# Patient Record
Sex: MALE | Race: OTHER | HISPANIC OR LATINO | URBAN - METROPOLITAN AREA
[De-identification: names, ages, dates, MRNs, and addresses within clinical notes are randomized per-mention and may not be internally consistent; named-entity substitution may affect disease eponyms.]

---

## 2017-02-13 ENCOUNTER — INPATIENT (INPATIENT)
Facility: HOSPITAL | Age: 57
LOS: 1 days | Discharge: HOME | DRG: 374 | End: 2017-02-15
Attending: FAMILY MEDICINE | Admitting: INTERNAL MEDICINE
Payer: MEDICAID

## 2017-02-13 VITALS
DIASTOLIC BLOOD PRESSURE: 79 MMHG | TEMPERATURE: 98 F | OXYGEN SATURATION: 100 % | RESPIRATION RATE: 18 BRPM | WEIGHT: 175.05 LBS | HEART RATE: 78 BPM | SYSTOLIC BLOOD PRESSURE: 124 MMHG

## 2017-02-13 DIAGNOSIS — R17 UNSPECIFIED JAUNDICE: ICD-10-CM

## 2017-02-13 DIAGNOSIS — E11.9 TYPE 2 DIABETES MELLITUS WITHOUT COMPLICATIONS: ICD-10-CM

## 2017-02-13 DIAGNOSIS — I10 ESSENTIAL (PRIMARY) HYPERTENSION: ICD-10-CM

## 2017-02-13 DIAGNOSIS — K86.9 DISEASE OF PANCREAS, UNSPECIFIED: ICD-10-CM

## 2017-02-13 DIAGNOSIS — E78.5 HYPERLIPIDEMIA, UNSPECIFIED: ICD-10-CM

## 2017-02-13 LAB
ALBUMIN SERPL ELPH-MCNC: 3.8 G/DL — SIGNIFICANT CHANGE UP (ref 3.3–5.2)
ALP SERPL-CCNC: 215 U/L — HIGH (ref 40–120)
ALT FLD-CCNC: 106 U/L — HIGH
AMYLASE P1 CFR SERPL: 41 U/L — SIGNIFICANT CHANGE UP (ref 36–128)
ANION GAP SERPL CALC-SCNC: 9 MMOL/L — SIGNIFICANT CHANGE UP (ref 5–17)
APPEARANCE UR: ABNORMAL
APTT BLD: 29.7 SEC — SIGNIFICANT CHANGE UP (ref 27.5–37.4)
AST SERPL-CCNC: 65 U/L — HIGH
BACTERIA # UR AUTO: ABNORMAL
BASOPHILS # BLD AUTO: 0 K/UL — SIGNIFICANT CHANGE UP (ref 0–0.2)
BASOPHILS NFR BLD AUTO: 0.2 % — SIGNIFICANT CHANGE UP (ref 0–2)
BILIRUB SERPL-MCNC: 14.3 MG/DL — HIGH (ref 0.4–2)
BILIRUB UR-MCNC: ABNORMAL
BUN SERPL-MCNC: 13 MG/DL — SIGNIFICANT CHANGE UP (ref 8–20)
CALCIUM SERPL-MCNC: 9.8 MG/DL — SIGNIFICANT CHANGE UP (ref 8.6–10.2)
CHLORIDE SERPL-SCNC: 96 MMOL/L — LOW (ref 98–107)
CO2 SERPL-SCNC: 29 MMOL/L — SIGNIFICANT CHANGE UP (ref 22–29)
COLOR SPEC: ABNORMAL
CREAT SERPL-MCNC: 0.39 MG/DL — LOW (ref 0.5–1.3)
DIFF PNL FLD: ABNORMAL
EOSINOPHIL # BLD AUTO: 0 K/UL — SIGNIFICANT CHANGE UP (ref 0–0.5)
EOSINOPHIL NFR BLD AUTO: 0.9 % — SIGNIFICANT CHANGE UP (ref 0–5)
EPI CELLS # UR: SIGNIFICANT CHANGE UP
GLUCOSE SERPL-MCNC: 269 MG/DL — HIGH (ref 70–115)
GLUCOSE UR QL: 250 MG/DL
HCT VFR BLD CALC: 35.2 % — LOW (ref 42–52)
HGB BLD-MCNC: 12.3 G/DL — LOW (ref 14–18)
INR BLD: 1.43 RATIO — HIGH (ref 0.88–1.16)
KETONES UR-MCNC: ABNORMAL
LEUKOCYTE ESTERASE UR-ACNC: ABNORMAL
LIDOCAIN IGE QN: 44 U/L — SIGNIFICANT CHANGE UP (ref 22–51)
LYMPHOCYTES # BLD AUTO: 1.3 K/UL — SIGNIFICANT CHANGE UP (ref 1–4.8)
LYMPHOCYTES # BLD AUTO: 24.2 % — SIGNIFICANT CHANGE UP (ref 20–55)
MCHC RBC-ENTMCNC: 30 PG — SIGNIFICANT CHANGE UP (ref 27–31)
MCHC RBC-ENTMCNC: 34.9 G/DL — SIGNIFICANT CHANGE UP (ref 32–36)
MCV RBC AUTO: 85.9 FL — SIGNIFICANT CHANGE UP (ref 80–94)
MONOCYTES # BLD AUTO: 0.6 K/UL — SIGNIFICANT CHANGE UP (ref 0–0.8)
MONOCYTES NFR BLD AUTO: 10.4 % — HIGH (ref 3–10)
NEUTROPHILS # BLD AUTO: 3.4 K/UL — SIGNIFICANT CHANGE UP (ref 1.8–8)
NEUTROPHILS NFR BLD AUTO: 63.7 % — SIGNIFICANT CHANGE UP (ref 37–73)
NITRITE UR-MCNC: POSITIVE
PH UR: 5 — SIGNIFICANT CHANGE UP (ref 4.8–8)
PLATELET # BLD AUTO: 264 K/UL — SIGNIFICANT CHANGE UP (ref 150–400)
POTASSIUM SERPL-MCNC: 4.4 MMOL/L — SIGNIFICANT CHANGE UP (ref 3.5–5.3)
POTASSIUM SERPL-SCNC: 4.4 MMOL/L — SIGNIFICANT CHANGE UP (ref 3.5–5.3)
PROT SERPL-MCNC: 7.1 G/DL — SIGNIFICANT CHANGE UP (ref 6.6–8.7)
PROT UR-MCNC: 30 MG/DL
PROTHROM AB SERPL-ACNC: 15.8 SEC — HIGH (ref 10–13.1)
RBC # BLD: 4.1 M/UL — LOW (ref 4.6–6.2)
RBC # FLD: 14 % — SIGNIFICANT CHANGE UP (ref 11–15.6)
RBC CASTS # UR COMP ASSIST: ABNORMAL /HPF (ref 0–4)
SODIUM SERPL-SCNC: 134 MMOL/L — LOW (ref 135–145)
SP GR SPEC: 1.02 — SIGNIFICANT CHANGE UP (ref 1.01–1.02)
UROBILINOGEN FLD QL: 4 MG/DL
WBC # BLD: 5.29 K/UL — SIGNIFICANT CHANGE UP (ref 4.8–10.8)
WBC # FLD AUTO: 5.29 K/UL — SIGNIFICANT CHANGE UP (ref 4.8–10.8)
WBC UR QL: SIGNIFICANT CHANGE UP

## 2017-02-13 PROCEDURE — 99285 EMERGENCY DEPT VISIT HI MDM: CPT

## 2017-02-13 PROCEDURE — 74177 CT ABD & PELVIS W/CONTRAST: CPT | Mod: 26

## 2017-02-13 PROCEDURE — 99223 1ST HOSP IP/OBS HIGH 75: CPT

## 2017-02-13 RX ORDER — DEXTROSE 50 % IN WATER 50 %
25 SYRINGE (ML) INTRAVENOUS ONCE
Qty: 0 | Refills: 0 | Status: DISCONTINUED | OUTPATIENT
Start: 2017-02-13 | End: 2017-02-15

## 2017-02-13 RX ORDER — AMLODIPINE BESYLATE 2.5 MG/1
5 TABLET ORAL DAILY
Qty: 0 | Refills: 0 | Status: DISCONTINUED | OUTPATIENT
Start: 2017-02-13 | End: 2017-02-15

## 2017-02-13 RX ORDER — DEXTROSE 50 % IN WATER 50 %
12.5 SYRINGE (ML) INTRAVENOUS ONCE
Qty: 0 | Refills: 0 | Status: DISCONTINUED | OUTPATIENT
Start: 2017-02-13 | End: 2017-02-15

## 2017-02-13 RX ORDER — INSULIN LISPRO 100/ML
VIAL (ML) SUBCUTANEOUS
Qty: 0 | Refills: 0 | Status: DISCONTINUED | OUTPATIENT
Start: 2017-02-13 | End: 2017-02-15

## 2017-02-13 RX ORDER — DEXTROSE 50 % IN WATER 50 %
1 SYRINGE (ML) INTRAVENOUS ONCE
Qty: 0 | Refills: 0 | Status: DISCONTINUED | OUTPATIENT
Start: 2017-02-13 | End: 2017-02-15

## 2017-02-13 RX ORDER — INSULIN GLARGINE 100 [IU]/ML
30 INJECTION, SOLUTION SUBCUTANEOUS AT BEDTIME
Qty: 0 | Refills: 0 | Status: DISCONTINUED | OUTPATIENT
Start: 2017-02-13 | End: 2017-02-15

## 2017-02-13 RX ORDER — DIPHENHYDRAMINE HCL 50 MG
50 CAPSULE ORAL ONCE
Qty: 0 | Refills: 0 | Status: COMPLETED | OUTPATIENT
Start: 2017-02-13 | End: 2017-02-13

## 2017-02-13 RX ORDER — SODIUM CHLORIDE 9 MG/ML
1000 INJECTION, SOLUTION INTRAVENOUS
Qty: 0 | Refills: 0 | Status: DISCONTINUED | OUTPATIENT
Start: 2017-02-13 | End: 2017-02-15

## 2017-02-13 RX ORDER — GLUCAGON INJECTION, SOLUTION 0.5 MG/.1ML
1 INJECTION, SOLUTION SUBCUTANEOUS ONCE
Qty: 0 | Refills: 0 | Status: DISCONTINUED | OUTPATIENT
Start: 2017-02-13 | End: 2017-02-15

## 2017-02-13 RX ORDER — SODIUM CHLORIDE 9 MG/ML
3 INJECTION INTRAMUSCULAR; INTRAVENOUS; SUBCUTANEOUS EVERY 8 HOURS
Qty: 0 | Refills: 0 | Status: DISCONTINUED | OUTPATIENT
Start: 2017-02-13 | End: 2017-02-15

## 2017-02-13 RX ADMIN — SODIUM CHLORIDE 3 MILLILITER(S): 9 INJECTION INTRAMUSCULAR; INTRAVENOUS; SUBCUTANEOUS at 21:03

## 2017-02-13 RX ADMIN — SODIUM CHLORIDE 3 MILLILITER(S): 9 INJECTION INTRAMUSCULAR; INTRAVENOUS; SUBCUTANEOUS at 13:40

## 2017-02-13 RX ADMIN — Medication: at 22:59

## 2017-02-13 RX ADMIN — INSULIN GLARGINE 30 UNIT(S): 100 INJECTION, SOLUTION SUBCUTANEOUS at 22:59

## 2017-02-13 RX ADMIN — Medication 50 MILLIGRAM(S): at 12:46

## 2017-02-13 NOTE — ED STATDOCS - PROGRESS NOTE DETAILS
55 y/o male with h/o DM. HTN, and hepatitis (undetermined etiology, Dx at [facility] via CT and ERCP on 1/27/17) presents to ED c/o diffuse pruritis. Denies abd distension. Pt to be moved to main ED for complete evaluation by another provider. Pt is scheduled to see GI Dr. Jose Wise (GI) tomorrow, please contact him. Please try to obtain copies of ERCP and CT scan results from [facility] and obtain GI consult (Billie, if available).

## 2017-02-13 NOTE — SBIRT NOTE. - NSSBIRTSERVICES_GEN_A_ED_FT
Provided SBIRT services: Full screen Negative. Positive reinforcement provided given patient currently within healthy guidelines. Education materials reviewed and given to patient.  Audit Score: 7  DAST Score: 0  Duration = 3 Minutes

## 2017-02-13 NOTE — ED ADULT NURSE NOTE - OBJECTIVE STATEMENT
Patient arrived to ED today with c/o body aches, body itch, and swelling to his abdomen.  Patient has hepatitis.  pt is A/Ox3.

## 2017-02-13 NOTE — ED PROVIDER NOTE - MEDICAL DECISION MAKING DETAILS
Patient w/ pancreatic mass pushing on duodenum and obstructing the common bile duct.  Will admit patient for further eval and therapy

## 2017-02-13 NOTE — H&P ADULT. - ASSESSMENT
56 years old male with PMH of HTN, DM and Dyslipidemia , recently diagnosed with pancreatic mass admitted with pruritis and yellow discoloration of the skin.

## 2017-02-13 NOTE — ED ADULT TRIAGE NOTE - CHIEF COMPLAINT QUOTE
Patient arrived to ED today with c/o body aches, body itch, and swelling to his abdomen.  Patient has hepatitis.

## 2017-02-13 NOTE — ED PROVIDER NOTE - OBJECTIVE STATEMENT
57yo male recently released from [facility] for painless jaundice with itching, was told he had a "mass" in liver and needed it "drained"(biopsied?) and was told to come to [facility] because [facility] does not do this procedure.    CC: pruritis and jaundice  Presenting symptoms: jaundice and itching which is being treated by benadryl  Pertinent Negatives: abdominal pain  Timin weeks ago noticed the jaundice and pruritis  Quality: itching  Severity: moderate  Aggravating Factors: none  Relieving Factors: benadryl

## 2017-02-13 NOTE — H&P ADULT. - FAMILY HISTORY
Aunt  Still living? Yes, Estimated age: Age Unknown  Family history of cancer, Age at diagnosis: Age Unknown

## 2017-02-13 NOTE — ED PROVIDER NOTE - CONSTITUTIONAL, MLM
normal... well nourished, awake, alert, oriented to person, place, time/situation and in no apparent distress.

## 2017-02-13 NOTE — ED PROVIDER NOTE - CARE PLAN
Principal Discharge DX:	Jaundice with stoppage of bile flow  Secondary Diagnosis:	Pancreatic mass  Secondary Diagnosis:	Hyperbilirubinemia

## 2017-02-13 NOTE — H&P ADULT. - PROBLEM SELECTOR PLAN 1
s/p biopsy at [facility] 12 days ago, results pending.  obtain medical record from [facility].  amylase/lipase level  GI consult.

## 2017-02-13 NOTE — H&P ADULT. - HISTORY OF PRESENT ILLNESS
56 years old male with PMH of HTN, DM and Dyslipidemia presented to the ER with 2 weeks history of pruritis and yellow discoloration of the skin. He was recently admitted at [facility] for the same, found to have pancreatic mass which was biopsied. Patient was discharged home t follow up with GI. Patient denies any fever, chills, nausea, vomiting, abdominal pain, diarrhea , constipation or urinary complaints.

## 2017-02-14 DIAGNOSIS — E80.6 OTHER DISORDERS OF BILIRUBIN METABOLISM: ICD-10-CM

## 2017-02-14 DIAGNOSIS — R17 UNSPECIFIED JAUNDICE: ICD-10-CM

## 2017-02-14 LAB
CULTURE RESULTS: NO GROWTH — SIGNIFICANT CHANGE UP
HAV IGM SER-ACNC: SIGNIFICANT CHANGE UP
HBV CORE IGM SER-ACNC: SIGNIFICANT CHANGE UP
HBV SURFACE AG SER-ACNC: SIGNIFICANT CHANGE UP
HCV AB S/CO SERPL IA: 0.25 S/CO — SIGNIFICANT CHANGE UP
HCV AB SERPL-IMP: SIGNIFICANT CHANGE UP
SPECIMEN SOURCE: SIGNIFICANT CHANGE UP

## 2017-02-14 PROCEDURE — 99233 SBSQ HOSP IP/OBS HIGH 50: CPT

## 2017-02-14 RX ORDER — INFLUENZA VIRUS VACCINE 15; 15; 15; 15 UG/.5ML; UG/.5ML; UG/.5ML; UG/.5ML
0.5 SUSPENSION INTRAMUSCULAR ONCE
Qty: 0 | Refills: 0 | Status: DISCONTINUED | OUTPATIENT
Start: 2017-02-14 | End: 2017-02-15

## 2017-02-14 RX ORDER — LISINOPRIL 2.5 MG/1
10 TABLET ORAL DAILY
Qty: 0 | Refills: 0 | Status: DISCONTINUED | OUTPATIENT
Start: 2017-02-14 | End: 2017-02-15

## 2017-02-14 RX ORDER — INSULIN LISPRO 100/ML
8 VIAL (ML) SUBCUTANEOUS ONCE
Qty: 0 | Refills: 0 | Status: COMPLETED | OUTPATIENT
Start: 2017-02-14 | End: 2017-02-15

## 2017-02-14 RX ORDER — DIPHENHYDRAMINE HCL 50 MG
25 CAPSULE ORAL ONCE
Qty: 0 | Refills: 0 | Status: COMPLETED | OUTPATIENT
Start: 2017-02-14 | End: 2017-02-14

## 2017-02-14 RX ADMIN — LISINOPRIL 10 MILLIGRAM(S): 2.5 TABLET ORAL at 22:19

## 2017-02-14 RX ADMIN — SODIUM CHLORIDE 3 MILLILITER(S): 9 INJECTION INTRAMUSCULAR; INTRAVENOUS; SUBCUTANEOUS at 14:46

## 2017-02-14 RX ADMIN — SODIUM CHLORIDE 3 MILLILITER(S): 9 INJECTION INTRAMUSCULAR; INTRAVENOUS; SUBCUTANEOUS at 05:37

## 2017-02-14 RX ADMIN — Medication 25 MILLIGRAM(S): at 06:19

## 2017-02-14 RX ADMIN — AMLODIPINE BESYLATE 5 MILLIGRAM(S): 2.5 TABLET ORAL at 05:37

## 2017-02-14 RX ADMIN — Medication: at 14:46

## 2017-02-14 RX ADMIN — Medication: at 17:24

## 2017-02-14 RX ADMIN — INSULIN GLARGINE 30 UNIT(S): 100 INJECTION, SOLUTION SUBCUTANEOUS at 22:17

## 2017-02-14 RX ADMIN — Medication: at 09:55

## 2017-02-14 RX ADMIN — SODIUM CHLORIDE 3 MILLILITER(S): 9 INJECTION INTRAMUSCULAR; INTRAVENOUS; SUBCUTANEOUS at 22:20
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
## 2017-02-14 NOTE — ED ADULT NURSE REASSESSMENT NOTE - NS ED NURSE REASSESS COMMENT FT1
pt A&Ox3, resting comfortably easily arousable, resp even and unlabored no distress noted, pt denies any pain andhas  no complaints , will continue to monitor
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
## 2017-02-14 NOTE — ED ADULT NURSE REASSESSMENT NOTE - NS ED NURSE REASSESS COMMENT FT1
pt sleeping and easily arousable, resp even and unlabored no distress noted , pt denies any pain and has nocomplaints will continue to monitor

## 2017-02-14 NOTE — PROGRESS NOTE ADULT - PROBLEM SELECTOR PLAN 1
GI consult appreciated. Biopsy at [facility] showed poorly differentiated duodenal adenocarcinoma involving ampulla. ERCP was aborted at [facility]. No PTC done at [facility] due to lack of any biliary dilation. On CT here yesterday, there is no biliary dilation.   IR-guided percutaneous cholecystostomy tomorrow is scheduled for tomorrow. GI consult appreciated. Biopsy at [facility] showed poorly differentiated duodenal adenocarcinoma involving ampulla. ERCP was aborted at [facility]. No PTC done at [facility] due to lack of any biliary dilation. On CT here yesterday, there is  biliary dilation.   IR-guided percutaneous cholecystostomy tomorrow is scheduled for tomorrow. GI consult appreciated. Biopsy at [facility] showed poorly differentiated duodenal adenocarcinoma involving ampulla. ERCP was aborted at [facility]. No PTC done at [facility] due to lack of any biliary dilation. On CT here yesterday, there is no biliary dilation.   IR-guided percutaneous cholecystostomy tomorrow is scheduled for tomorrow.

## 2017-02-15 VITALS
SYSTOLIC BLOOD PRESSURE: 115 MMHG | HEART RATE: 85 BPM | TEMPERATURE: 98 F | DIASTOLIC BLOOD PRESSURE: 69 MMHG | RESPIRATION RATE: 17 BRPM | OXYGEN SATURATION: 100 %

## 2017-02-15 DIAGNOSIS — K31.89 OTHER DISEASES OF STOMACH AND DUODENUM: ICD-10-CM

## 2017-02-15 LAB
ALBUMIN SERPL ELPH-MCNC: 3.8 G/DL — SIGNIFICANT CHANGE UP (ref 3.3–5.2)
ALP SERPL-CCNC: 222 U/L — HIGH (ref 40–120)
ALT FLD-CCNC: 101 U/L — HIGH
ANION GAP SERPL CALC-SCNC: 13 MMOL/L — SIGNIFICANT CHANGE UP (ref 5–17)
APTT BLD: 30.2 SEC — SIGNIFICANT CHANGE UP (ref 27.5–37.4)
AST SERPL-CCNC: 66 U/L — HIGH
BILIRUB SERPL-MCNC: 14.7 MG/DL — HIGH (ref 0.4–2)
BLD GP AB SCN SERPL QL: SIGNIFICANT CHANGE UP
BUN SERPL-MCNC: 19 MG/DL — SIGNIFICANT CHANGE UP (ref 8–20)
CALCIUM SERPL-MCNC: 9.6 MG/DL — SIGNIFICANT CHANGE UP (ref 8.6–10.2)
CHLORIDE SERPL-SCNC: 98 MMOL/L — SIGNIFICANT CHANGE UP (ref 98–107)
CO2 SERPL-SCNC: 27 MMOL/L — SIGNIFICANT CHANGE UP (ref 22–29)
CREAT SERPL-MCNC: 0.56 MG/DL — SIGNIFICANT CHANGE UP (ref 0.5–1.3)
GLUCOSE SERPL-MCNC: 198 MG/DL — HIGH (ref 70–115)
GRAM STN FLD: SIGNIFICANT CHANGE UP
HCT VFR BLD CALC: 36.7 % — LOW (ref 42–52)
HGB BLD-MCNC: 12.8 G/DL — LOW (ref 14–18)
INR BLD: 1.52 RATIO — HIGH (ref 0.88–1.16)
MCHC RBC-ENTMCNC: 29.7 PG — SIGNIFICANT CHANGE UP (ref 27–31)
MCHC RBC-ENTMCNC: 34.9 G/DL — SIGNIFICANT CHANGE UP (ref 32–36)
MCV RBC AUTO: 85.2 FL — SIGNIFICANT CHANGE UP (ref 80–94)
PLATELET # BLD AUTO: 260 K/UL — SIGNIFICANT CHANGE UP (ref 150–400)
POTASSIUM SERPL-MCNC: 4.1 MMOL/L — SIGNIFICANT CHANGE UP (ref 3.5–5.3)
POTASSIUM SERPL-SCNC: 4.1 MMOL/L — SIGNIFICANT CHANGE UP (ref 3.5–5.3)
PROT SERPL-MCNC: 7 G/DL — SIGNIFICANT CHANGE UP (ref 6.6–8.7)
PROTHROM AB SERPL-ACNC: 16.8 SEC — HIGH (ref 10–13.1)
RBC # BLD: 4.31 M/UL — LOW (ref 4.6–6.2)
RBC # FLD: 14.2 % — SIGNIFICANT CHANGE UP (ref 11–15.6)
SODIUM SERPL-SCNC: 138 MMOL/L — SIGNIFICANT CHANGE UP (ref 135–145)
SPECIMEN SOURCE: SIGNIFICANT CHANGE UP
TYPE + AB SCN PNL BLD: SIGNIFICANT CHANGE UP
WBC # BLD: 4.9 K/UL — SIGNIFICANT CHANGE UP (ref 4.8–10.8)
WBC # FLD AUTO: 4.9 K/UL — SIGNIFICANT CHANGE UP (ref 4.8–10.8)

## 2017-02-15 PROCEDURE — 75989 ABSCESS DRAINAGE UNDER X-RAY: CPT

## 2017-02-15 PROCEDURE — T1013: CPT

## 2017-02-15 PROCEDURE — 87075 CULTR BACTERIA EXCEPT BLOOD: CPT

## 2017-02-15 PROCEDURE — 36415 COLL VENOUS BLD VENIPUNCTURE: CPT

## 2017-02-15 PROCEDURE — 87205 SMEAR GRAM STAIN: CPT

## 2017-02-15 PROCEDURE — 99223 1ST HOSP IP/OBS HIGH 75: CPT

## 2017-02-15 PROCEDURE — 74177 CT ABD & PELVIS W/CONTRAST: CPT

## 2017-02-15 PROCEDURE — 82248 BILIRUBIN DIRECT: CPT

## 2017-02-15 PROCEDURE — 87086 URINE CULTURE/COLONY COUNT: CPT

## 2017-02-15 PROCEDURE — 99239 HOSP IP/OBS DSCHRG MGMT >30: CPT

## 2017-02-15 PROCEDURE — 99285 EMERGENCY DEPT VISIT HI MDM: CPT | Mod: 25

## 2017-02-15 PROCEDURE — 87070 CULTURE OTHR SPECIMN AEROBIC: CPT

## 2017-02-15 PROCEDURE — 81001 URINALYSIS AUTO W/SCOPE: CPT

## 2017-02-15 PROCEDURE — 83690 ASSAY OF LIPASE: CPT

## 2017-02-15 PROCEDURE — 86900 BLOOD TYPING SEROLOGIC ABO: CPT

## 2017-02-15 PROCEDURE — 96374 THER/PROPH/DIAG INJ IV PUSH: CPT | Mod: XU

## 2017-02-15 PROCEDURE — 71260 CT THORAX DX C+: CPT

## 2017-02-15 PROCEDURE — 85730 THROMBOPLASTIN TIME PARTIAL: CPT

## 2017-02-15 PROCEDURE — 71260 CT THORAX DX C+: CPT | Mod: 26

## 2017-02-15 PROCEDURE — 85610 PROTHROMBIN TIME: CPT

## 2017-02-15 PROCEDURE — 47490 INCISION OF GALLBLADDER: CPT

## 2017-02-15 PROCEDURE — 85027 COMPLETE CBC AUTOMATED: CPT

## 2017-02-15 PROCEDURE — 82150 ASSAY OF AMYLASE: CPT

## 2017-02-15 PROCEDURE — 80074 ACUTE HEPATITIS PANEL: CPT

## 2017-02-15 PROCEDURE — 86850 RBC ANTIBODY SCREEN: CPT

## 2017-02-15 PROCEDURE — 86901 BLOOD TYPING SEROLOGIC RH(D): CPT

## 2017-02-15 PROCEDURE — 80053 COMPREHEN METABOLIC PANEL: CPT

## 2017-02-15 PROCEDURE — 83735 ASSAY OF MAGNESIUM: CPT

## 2017-02-15 RX ORDER — TADALAFIL 10 MG/1
1 TABLET, FILM COATED ORAL
Qty: 0 | Refills: 0 | COMMUNITY

## 2017-02-15 RX ORDER — SIMVASTATIN 20 MG/1
1 TABLET, FILM COATED ORAL
Qty: 0 | Refills: 0 | COMMUNITY

## 2017-02-15 RX ORDER — INSULIN GLARGINE 100 [IU]/ML
40 INJECTION, SOLUTION SUBCUTANEOUS AT BEDTIME
Qty: 0 | Refills: 0 | Status: DISCONTINUED | OUTPATIENT
Start: 2017-02-15 | End: 2017-02-15

## 2017-02-15 RX ORDER — CETIRIZINE HYDROCHLORIDE 10 MG/1
1 TABLET ORAL
Qty: 0 | Refills: 0 | COMMUNITY

## 2017-02-15 RX ORDER — METFORMIN HYDROCHLORIDE 850 MG/1
1 TABLET ORAL
Qty: 0 | Refills: 0 | COMMUNITY

## 2017-02-15 RX ADMIN — AMLODIPINE BESYLATE 5 MILLIGRAM(S): 2.5 TABLET ORAL at 06:07

## 2017-02-15 RX ADMIN — Medication 6: at 12:32

## 2017-02-15 RX ADMIN — LISINOPRIL 10 MILLIGRAM(S): 2.5 TABLET ORAL at 06:09

## 2017-02-15 RX ADMIN — SODIUM CHLORIDE 3 MILLILITER(S): 9 INJECTION INTRAMUSCULAR; INTRAVENOUS; SUBCUTANEOUS at 06:10

## 2017-02-15 RX ADMIN — SODIUM CHLORIDE 3 MILLILITER(S): 9 INJECTION INTRAMUSCULAR; INTRAVENOUS; SUBCUTANEOUS at 14:18

## 2017-02-15 RX ADMIN — Medication 8 UNIT(S): at 00:15

## 2017-02-15 NOTE — DISCHARGE NOTE ADULT - SECONDARY DIAGNOSIS.
Jaundice with stoppage of bile flow Secondary hypertension Hyperlipidemia, unspecified hyperlipidemia type Type 2 diabetes mellitus with diabetic polyneuropathy, unspecified long term insulin use status

## 2017-02-15 NOTE — DISCHARGE NOTE ADULT - HOSPITAL COURSE
CC: Jaundice (15 Feb 2017 12:25)    HPI:56 years old male with PMH of HTN, DM and Dyslipidemia presented to the ER with 2 weeks history of pruritis and yellow discoloration of the skin. He was recently admitted at [facility] for the same, found to have pancreatic mass which was biopsied. Patient was discharged home t follow up with GI. Patient denies any fever, chills, nausea, vomiting, abdominal pain, diarrhea , constipation or urinary complaints. (13 Feb 2017 18:34)    INTERVAL HPI/OVERNIGHT EVENTS: + SOB/ wheezing, confusion    Vital Signs Last 24 Hrs  T(C): 36.6, Max: 37 (02-15 @ 00:30)  T(F): 97.9, Max: 98.6 (02-15 @ 00:30)  HR: 85 (80 - 85)  BP: 115/69 (115/69 - 138/81)  RR: 17 (17 - 18)  SpO2: 100% (98% - 100%)                        12.8   4.9   )-----------( 260      ( 15 Feb 2017 07:31 )             36.7     15 Feb 2017 07:31    138    |  98     |  19.0   ----------------------------<  198    4.1     |  27.0   |  0.56     Ca    9.6        15 Feb 2017 07:31    TPro  7.0    /  Alb  3.8    /  TBili  14.7   /  DBili  x      /  AST  66     /  ALT  101    /  AlkPhos  222    15 Feb 2017 07:31    PT/INR - ( 15 Feb 2017 07:31 )   PT: 16.8 sec;   INR: 1.52 ratio      PTT - ( 15 Feb 2017 07:31 )  PTT:30.2 sec  CAPILLARY BLOOD GLUCOSE  415 (15 Feb 2017 12:30)  171 (15 Feb 2017 08:00)  241 (14 Feb 2017 14:46)    LIVER FUNCTIONS - ( 15 Feb 2017 07:31 )  Alb: 3.8 g/dL / Pro: 7.0 g/dL / ALK PHOS: 222 U/L / ALT: 101 U/L / AST: 66 U/L / GGT: x           MEDICATIONS  (STANDING):  sodium chloride 0.9% lock flush 3milliLiter(s) IV Push every 8 hours  amLODIPine   Tablet 5milliGRAM(s) Oral daily  insulin lispro (HumaLOG) corrective regimen sliding scale  SubCutaneous three times a day before meals  dextrose 5%. 1000milliLiter(s) IV Continuous <Continuous>  dextrose 50% Injectable 12.5Gram(s) IV Push once  dextrose 50% Injectable 25Gram(s) IV Push once  dextrose 50% Injectable 25Gram(s) IV Push once  influenza   Vaccine 0.5milliLiter(s) IntraMuscular once  lisinopril 10milliGRAM(s) Oral daily  insulin glargine Injectable (LANTUS) 40Unit(s) SubCutaneous at bedtime    MEDICATIONS  (PRN):  dextrose Gel 1Dose(s) Oral once PRN Blood Glucose LESS THAN 70 milliGRAM(s)/deciliter  glucagon  Injectable 1milliGRAM(s) IntraMuscular once PRN Glucose LESS THAN 70 milligrams/deciliter    RADIOLOGY & ADDITIONAL TESTS: personally visualized    PHYSICAL EXAM:    General: Well developed; well nourished; in no acute distress  Eyes: PERRLA, EOMI; conjunctiva and sclera with jaundice  Head: Normocephalic; atraumatic  ENMT: No nasal discharge; airway clear  Neck: Supple; non tender; no masses  Respiratory: No wheezes, rales or rhonchi  Cardiovascular: Regular rate and rhythm. S1 and S2 Normal  Gastrointestinal: Soft non-tender non-distended; Normal bowel sounds, + cholecystostomy tube with bilous drainage  Genitourinary: No costovertebral angle tenderness  Extremities: Normal range of motion, No clubbing, cyanosis or edema  Vascular: Peripheral pulses palpable 2+ bilaterally  Neurological: Alert and oriented x4  Skin: Warm and dry. jaundiced  Musculoskeletal: Normal gait, tone, without deformities  Psychiatric: Cooperative and appropriate    A/P: 56 y.o. male with DMII, HTN, HLD with new pancreatic mass  1. Pancreatic mass - duodenal poorly differentiated adenocarcinoma - s/p percutaneous cholecystostomy due to biliary obstruction with jaundice and pruritus, f/u with Oncology and Surgical oncology, f/u CT chest for staging purposes  2. DMII - lantus + novolin  3. HLD - stopped statin due to weight loss  4. HTN - norvasc + lisinopril    Discussed with Dr. Barba and Ian  Time spent 65 min'  PMD notified

## 2017-02-15 NOTE — DISCHARGE NOTE ADULT - CARE PROVIDER_API CALL
Dr. Almita Garcia  Internal medicine  [address] [place] [address]  [phone]  Phone: (   )    -  Fax: (   )    -    Arthur Moreira), Hematology; Medical Oncology  [address] [place] [address]  Phone: [phone]  Fax: [phone] Dr. Almita Garcia  Internal medicine  [address] [facility], [place] [address]  [phone]  Phone: (   )    -  Fax: (   )    -    Ana María Barba (MD), [facility]; Internal Medicine  [address] [place] [address]  Phone: [phone]  Fax: [phone]    Avelino Sosa), ColonRectal Surgery; Surgery  [address] [place] [address]  Phone: [phone]  Fax: [phone]
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
## 2017-02-15 NOTE — CONSULT NOTE ADULT - ASSESSMENT
Mr. Doyle is a 56 year old gentleman with a recently presented with jaundice, pruritus and was found to have a duodenal mass which was biopsied at [facility].  There was no biliary dilatation hence no PTC was done.  Biopsy of the mass reportedly shows poorly differentiated duodenal adenocarcinoma.  He is now admitted at [facility] for worsening pruritus, jaundice and is status post percutaneous cholecystostomy tube by IR today.

## 2017-02-15 NOTE — DISCHARGE NOTE ADULT - PROVIDER TOKENS
FREE:[LAST:[Jose],PHONE:[(   )    -],FAX:[(   )    -],ADDRESS:[Dr. Almita Garcia  Internal medicine  [address] [place] [address]  [phone]]],TOKEN:'[email or web address]' FREE:[LAST:[Jose],PHONE:[(   )    -],FAX:[(   )    -],ADDRESS:[Dr. Almita Garcia  Internal medicine  [address] [place] [address]  [phone]]],TOKEN:'[id number]',TOKEN:'[id number]'

## 2017-02-15 NOTE — DISCHARGE NOTE ADULT - CARE PLAN
Principal Discharge DX:	Pancreatic mass  Goal:	treatment  Instructions for follow-up, activity and diet:	with biliary obstruction, jaundice and pruritus - s/p percutaneous cholecystostomy  Bx from [facility] as per Dr. Mann consistent with poorly differentiated duodenal adenocarcinoma   - plan for f/u with Jameson Collins for treatment plan  CT chest with IV contrast for staging purposes prior to DC  Secondary Diagnosis:	Jaundice with stoppage of bile flow  Instructions for follow-up, activity and diet:	see above  Secondary Diagnosis:	Secondary hypertension  Instructions for follow-up, activity and diet:	norvasc, lisinopril  Secondary Diagnosis:	Hyperlipidemia, unspecified hyperlipidemia type  Instructions for follow-up, activity and diet:	stop statin due to weight loss  Secondary Diagnosis:	Type 2 diabetes mellitus with diabetic polyneuropathy, unspecified long term insulin use status  Instructions for follow-up, activity and diet:	lantus, novolin, diet Principal Discharge DX:	Pancreatic mass  Goal:	treatment  Instructions for follow-up, activity and diet:	with biliary obstruction, jaundice and pruritus - s/p percutaneous cholecystostomy  Bx from [facility] as per Dr. Mann consistent with poorly differentiated duodenal adenocarcinoma   - plan for f/u with Jameson Collins for treatment plan  CT chest with IV contrast for staging purposes prior to DC  Secondary Diagnosis:	Jaundice with stoppage of bile flow  Instructions for follow-up, activity and diet:	see above  Secondary Diagnosis:	Secondary hypertension  Instructions for follow-up, activity and diet:	norvasc, lisinopril  Secondary Diagnosis:	Hyperlipidemia, unspecified hyperlipidemia type  Instructions for follow-up, activity and diet:	stop statin due to weight loss  Secondary Diagnosis:	Type 2 diabetes mellitus with diabetic polyneuropathy, unspecified long term insulin use status  Instructions for follow-up, activity and diet:	lantus, novolin, diet Principal Discharge DX:	Pancreatic mass  Goal:	treatment  Instructions for follow-up, activity and diet:	with biliary obstruction, jaundice and pruritus - s/p percutaneous cholecystostomy  Bx from [facility] as per Dr. Mann consistent with poorly differentiated duodenal adenocarcinoma   - plan for f/u with Jameson Collins for treatment plan  CT chest with IV contrast for staging purposes prior to DC  Secondary Diagnosis:	Jaundice with stoppage of bile flow  Instructions for follow-up, activity and diet:	see above  Secondary Diagnosis:	Secondary hypertension  Instructions for follow-up, activity and diet:	norvasc, lisinopril  Secondary Diagnosis:	Hyperlipidemia, unspecified hyperlipidemia type  Instructions for follow-up, activity and diet:	stop statin due to weight loss  Secondary Diagnosis:	Type 2 diabetes mellitus with diabetic polyneuropathy, unspecified long term insulin use status  Instructions for follow-up, activity and diet:	lantus, novolin, diet

## 2017-02-15 NOTE — DISCHARGE NOTE ADULT - PLAN OF CARE
treatment with biliary obstruction, jaundice and pruritus - s/p percutaneous cholecystostomy  Bx from [facility] as per Dr. Mann consistent with poorly differentiated duodenal adenocarcinoma   - plan for f/u with Jameson Collins for treatment plan  CT chest with IV contrast for staging purposes prior to DC see above norvasc, lisinopril stop statin due to weight loss sawyertus, novolin, diet

## 2017-02-15 NOTE — PROGRESS NOTE ADULT - SUBJECTIVE AND OBJECTIVE BOX
INTERVAL HPI/OVERNIGHT EVENTS:  Patient seen and examined    MEDICATIONS  (STANDING):  sodium chloride 0.9% lock flush 3milliLiter(s) IV Push every 8 hours  amLODIPine   Tablet 5milliGRAM(s) Oral daily  insulin glargine Injectable (LANTUS) 30Unit(s) SubCutaneous at bedtime  insulin lispro (HumaLOG) corrective regimen sliding scale  SubCutaneous three times a day before meals  dextrose 5%. 1000milliLiter(s) IV Continuous <Continuous>  dextrose 50% Injectable 12.5Gram(s) IV Push once  dextrose 50% Injectable 25Gram(s) IV Push once  dextrose 50% Injectable 25Gram(s) IV Push once  influenza   Vaccine 0.5milliLiter(s) IntraMuscular once  lisinopril 10milliGRAM(s) Oral daily    MEDICATIONS  (PRN):  dextrose Gel 1Dose(s) Oral once PRN Blood Glucose LESS THAN 70 milliGRAM(s)/deciliter  glucagon  Injectable 1milliGRAM(s) IntraMuscular once PRN Glucose LESS THAN 70 milligrams/deciliter      Allergies    No Known Allergies    Intolerances        Vital Signs Last 24 Hrs  T(C): 36.6, Max: 37 (02-15 @ 00:30)  T(F): 97.9, Max: 98.6 (02-15 @ 00:30)  HR: 85 (80 - 85)  BP: 115/69 (115/69 - 138/81)  BP(mean): --  RR: 17 (17 - 18)  SpO2: 100% (98% - 100%)    PHYSICAL EXAM:  General: NAD.  CVS: S1, S2  Chest: air entry bilaterally present  Abd: BS present, soft, non-tender      LABS:                        12.8   4.9   )-----------( 260      ( 15 Feb 2017 07:31 )             36.7     15 Feb 2017 07:31    138    |  98     |  19.0   ----------------------------<  198    4.1     |  27.0   |  0.56     Ca    9.6        15 Feb 2017 07:31  Mg     1.8       13 Feb 2017 13:24    TPro  7.0    /  Alb  3.8    /  TBili  14.7   /  DBili  x      /  AST  66     /  ALT  101    /  AlkPhos  222    15 Feb 2017 07:31    PT/INR - ( 15 Feb 2017 07:31 )   PT: 16.8 sec;   INR: 1.52 ratio         PTT - ( 15 Feb 2017 07:31 )  PTT:30.2 sec

## 2017-02-15 NOTE — DISCHARGE NOTE ADULT - MEDICATION SUMMARY - MEDICATIONS TO STOP TAKING
I will STOP taking the medications listed below when I get home from the hospital:    Lantus  -- 45 unit(s) subcutaneous once (at bedtime)    Novolin N  -- 25 unit(s) subcutaneous once a day  AM    simvastatin 20 mg oral tablet  -- 1 tab(s) by mouth once a day (at bedtime)    metformin 500 mg oral tablet  -- 1 tab(s) by mouth 2 times a day    Percocet 10/325 325 mg-10 mg oral tablet  -- 1 tab(s) by mouth every 6 hours, As Needed pain

## 2017-02-15 NOTE — PROGRESS NOTE ADULT - ASSESSMENT
IMPRESSION:  56 years old male with PMH of HTN, DM and Dyslipidemia presented to the ER with 2 weeks history of pruritis and yellow discoloration of the skin. He was recently admitted at [facility] and found to have poorly differentiated duodenal adenocarcinoma involving ampulla. ERCP aborted. No PTC done due to lack of any biliary dilation. He was now admitted here for worsening jaundice and pruritis. CT abdomen with contrast again didn't any biliary dilation.  - s/p percut cholecystostomy tube placement today.    PLAN:  - monitor LFTs and INR  - f/u hepatology panel.  - Oncology consult.  - No endoscopic intervention for now  - d/w medicine team.
63 y/o male w PMH HTN, HLD, DM, admitted to [facility] for further evaluation and treatment pancreatic mass, elevated LFTs, jaundice, pruritus.

## 2017-02-15 NOTE — DISCHARGE NOTE ADULT - CARE PROVIDERS DIRECT ADDRESSES
,DirectAddress_Unknown,[email or web address],DirectAddress_Unknown ,DirectAddress_Unknown,[email or web address],DirectAddress_Unknown

## 2017-02-15 NOTE — DISCHARGE NOTE ADULT - MEDICATION SUMMARY - MEDICATIONS TO TAKE
I will START or STAY ON the medications listed below when I get home from the hospital:    lisinopril 10 mg oral tablet  -- 1 tab(s) by mouth once a day  -- Indication: For HTN    gabapentin 600 mg oral tablet  -- 1 tab(s) by mouth 3 times a day  -- Indication: For Neuropathy    insulin glargine 100 units/mL subcutaneous solution  -- 40 unit(s) subcutaneous once a day (at bedtime)  -- Indication: For Diabetes    Novolin N  -- 25 unit(s) subcutaneous once a day  AM  -- Indication: For Diabetes    amlodipine 5 mg oral tablet  -- 1 tab(s) by mouth once a day  -- Indication: For HTN    fluticasone 50 mcg/inh nasal spray  -- 1 spray(s) into nose 2 times a day  -- Indication: For Postnasal drip

## 2017-02-15 NOTE — DISCHARGE NOTE ADULT - PATIENT PORTAL LINK FT
“You can access the [facility] Patient Portal, offered by [facility], by registering with the following website: [email or web address]”

## 2017-02-17 ENCOUNTER — OUTPATIENT (OUTPATIENT)
Dept: OUTPATIENT SERVICES | Facility: HOSPITAL | Age: 57
LOS: 1 days | Discharge: HOME | End: 2017-02-17

## 2017-02-17 DIAGNOSIS — C17.0 MALIGNANT NEOPLASM OF DUODENUM: ICD-10-CM

## 2017-02-20 LAB
CULTURE RESULTS: SIGNIFICANT CHANGE UP
SPECIMEN SOURCE: SIGNIFICANT CHANGE UP

## 2017-02-22 ENCOUNTER — APPOINTMENT (OUTPATIENT)
Dept: SURGICAL ONCOLOGY | Facility: CLINIC | Age: 57
End: 2017-02-22

## 2017-02-22 VITALS — BODY MASS INDEX: 27.32 KG/M2 | HEIGHT: 66 IN | WEIGHT: 170 LBS

## 2017-02-22 DIAGNOSIS — R63.4 ABNORMAL WEIGHT LOSS: ICD-10-CM

## 2017-02-22 DIAGNOSIS — Z86.39 PERSONAL HISTORY OF OTHER ENDOCRINE, NUTRITIONAL AND METABOLIC DISEASE: ICD-10-CM

## 2017-02-22 DIAGNOSIS — Z86.79 PERSONAL HISTORY OF OTHER DISEASES OF THE CIRCULATORY SYSTEM: ICD-10-CM

## 2017-02-22 DIAGNOSIS — Z86.19 PERSONAL HISTORY OF OTHER INFECTIOUS AND PARASITIC DISEASES: ICD-10-CM

## 2017-02-22 DIAGNOSIS — H57.9 UNSPECIFIED DISORDER OF EYE AND ADNEXA: ICD-10-CM

## 2017-02-22 DIAGNOSIS — Z80.0 FAMILY HISTORY OF MALIGNANT NEOPLASM OF DIGESTIVE ORGANS: ICD-10-CM

## 2017-02-22 DIAGNOSIS — Z78.9 OTHER SPECIFIED HEALTH STATUS: ICD-10-CM

## 2017-02-23 PROBLEM — Z86.39 HISTORY OF DIABETIC NEUROPATHY: Status: RESOLVED | Noted: 2017-02-23 | Resolved: 2017-02-23

## 2017-02-23 PROBLEM — N52.9 ERECTILE DYSFUNCTION: Status: RESOLVED | Noted: 2017-02-23 | Resolved: 2017-02-23

## 2017-02-23 PROBLEM — Z86.39 HISTORY OF HYPERCHOLESTEROLEMIA: Status: RESOLVED | Noted: 2017-02-23 | Resolved: 2017-02-23

## 2017-02-23 PROBLEM — Z86.39 HISTORY OF HYPOGONADISM: Status: RESOLVED | Noted: 2017-02-23 | Resolved: 2017-02-23
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
## 2017-02-24 ENCOUNTER — APPOINTMENT (OUTPATIENT)
Dept: HEMATOLOGY ONCOLOGY | Facility: CLINIC | Age: 57
End: 2017-02-24

## 2017-02-24 DIAGNOSIS — N52.9 MALE ERECTILE DYSFUNCTION, UNSPECIFIED: ICD-10-CM

## 2017-02-24 DIAGNOSIS — Z86.39 PERSONAL HISTORY OF OTHER ENDOCRINE, NUTRITIONAL AND METABOLIC DISEASE: ICD-10-CM
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
## 2017-03-02 ENCOUNTER — RESULT REVIEW (OUTPATIENT)
Age: 57
End: 2017-03-02

## 2017-03-03 ENCOUNTER — OUTPATIENT (OUTPATIENT)
Dept: OUTPATIENT SERVICES | Facility: HOSPITAL | Age: 57
LOS: 1 days | End: 2017-03-03
Payer: COMMERCIAL

## 2017-03-03 DIAGNOSIS — C16.9 MALIGNANT NEOPLASM OF STOMACH, UNSPECIFIED: ICD-10-CM

## 2017-03-03 PROCEDURE — 88321 CONSLTJ&REPRT SLD PREP ELSWR: CPT

## 2017-03-05 ENCOUNTER — FORM ENCOUNTER (OUTPATIENT)
Age: 57
End: 2017-03-05

## 2017-03-06 ENCOUNTER — OUTPATIENT (OUTPATIENT)
Dept: OUTPATIENT SERVICES | Facility: HOSPITAL | Age: 57
LOS: 1 days | End: 2017-03-06

## 2017-03-06 ENCOUNTER — APPOINTMENT (OUTPATIENT)
Dept: MRI IMAGING | Facility: CLINIC | Age: 57
End: 2017-03-06

## 2017-03-06 DIAGNOSIS — Z00.8 ENCOUNTER FOR OTHER GENERAL EXAMINATION: ICD-10-CM

## 2017-03-07 LAB — SURGICAL PATHOLOGY STUDY: SIGNIFICANT CHANGE UP

## 2017-03-17 ENCOUNTER — OUTPATIENT (OUTPATIENT)
Dept: OUTPATIENT SERVICES | Facility: HOSPITAL | Age: 57
LOS: 1 days | End: 2017-03-17
Payer: MEDICAID

## 2017-03-17 VITALS
SYSTOLIC BLOOD PRESSURE: 116 MMHG | HEART RATE: 95 BPM | OXYGEN SATURATION: 99 % | TEMPERATURE: 97 F | DIASTOLIC BLOOD PRESSURE: 67 MMHG | HEIGHT: 64 IN | WEIGHT: 156.97 LBS | RESPIRATION RATE: 16 BRPM

## 2017-03-17 DIAGNOSIS — I10 ESSENTIAL (PRIMARY) HYPERTENSION: ICD-10-CM

## 2017-03-17 DIAGNOSIS — Z98.890 OTHER SPECIFIED POSTPROCEDURAL STATES: Chronic | ICD-10-CM

## 2017-03-17 DIAGNOSIS — Z78.9 OTHER SPECIFIED HEALTH STATUS: ICD-10-CM

## 2017-03-17 DIAGNOSIS — E11.9 TYPE 2 DIABETES MELLITUS WITHOUT COMPLICATIONS: ICD-10-CM

## 2017-03-17 DIAGNOSIS — Z96.89 PRESENCE OF OTHER SPECIFIED FUNCTIONAL IMPLANTS: Chronic | ICD-10-CM

## 2017-03-17 DIAGNOSIS — C25.9 MALIGNANT NEOPLASM OF PANCREAS, UNSPECIFIED: ICD-10-CM

## 2017-03-17 DIAGNOSIS — G47.33 OBSTRUCTIVE SLEEP APNEA (ADULT) (PEDIATRIC): ICD-10-CM

## 2017-03-17 LAB
ALBUMIN SERPL ELPH-MCNC: 4.4 G/DL — SIGNIFICANT CHANGE UP (ref 3.3–5)
ALP SERPL-CCNC: 104 U/L — SIGNIFICANT CHANGE UP (ref 40–120)
ALT FLD-CCNC: 50 U/L — HIGH (ref 4–41)
APTT BLD: 28.3 SEC — SIGNIFICANT CHANGE UP (ref 27.5–37.4)
AST SERPL-CCNC: 37 U/L — SIGNIFICANT CHANGE UP (ref 4–40)
BASOPHILS # BLD AUTO: 0.01 K/UL — SIGNIFICANT CHANGE UP (ref 0–0.2)
BASOPHILS NFR BLD AUTO: 0.1 % — SIGNIFICANT CHANGE UP (ref 0–2)
BILIRUB DIRECT SERPL-MCNC: 1.8 MG/DL — HIGH (ref 0.1–0.2)
BILIRUB SERPL-MCNC: 3.2 MG/DL — HIGH (ref 0.2–1.2)
BLD GP AB SCN SERPL QL: NEGATIVE — SIGNIFICANT CHANGE UP
BUN SERPL-MCNC: 17 MG/DL — SIGNIFICANT CHANGE UP (ref 7–23)
CALCIUM SERPL-MCNC: 10.2 MG/DL — SIGNIFICANT CHANGE UP (ref 8.4–10.5)
CHLORIDE SERPL-SCNC: 96 MMOL/L — LOW (ref 98–107)
CO2 SERPL-SCNC: 29 MMOL/L — SIGNIFICANT CHANGE UP (ref 22–31)
CREAT SERPL-MCNC: 1.16 MG/DL — SIGNIFICANT CHANGE UP (ref 0.5–1.3)
EOSINOPHIL # BLD AUTO: 0.07 K/UL — SIGNIFICANT CHANGE UP (ref 0–0.5)
EOSINOPHIL NFR BLD AUTO: 1 % — SIGNIFICANT CHANGE UP (ref 0–6)
GLUCOSE SERPL-MCNC: 190 MG/DL — HIGH (ref 70–99)
HBA1C BLD-MCNC: 8.7 % — HIGH (ref 4–5.6)
HCT VFR BLD CALC: 40 % — SIGNIFICANT CHANGE UP (ref 39–50)
HGB BLD-MCNC: 14 G/DL — SIGNIFICANT CHANGE UP (ref 13–17)
IMM GRANULOCYTES NFR BLD AUTO: 0.1 % — SIGNIFICANT CHANGE UP (ref 0–1.5)
INR BLD: 1.39 — HIGH (ref 0.87–1.18)
LYMPHOCYTES # BLD AUTO: 2.27 K/UL — SIGNIFICANT CHANGE UP (ref 1–3.3)
LYMPHOCYTES # BLD AUTO: 31.5 % — SIGNIFICANT CHANGE UP (ref 13–44)
MCHC RBC-ENTMCNC: 30.8 PG — SIGNIFICANT CHANGE UP (ref 27–34)
MCHC RBC-ENTMCNC: 35 % — SIGNIFICANT CHANGE UP (ref 32–36)
MCV RBC AUTO: 87.9 FL — SIGNIFICANT CHANGE UP (ref 80–100)
MONOCYTES # BLD AUTO: 0.45 K/UL — SIGNIFICANT CHANGE UP (ref 0–0.9)
MONOCYTES NFR BLD AUTO: 6.2 % — SIGNIFICANT CHANGE UP (ref 2–14)
NEUTROPHILS # BLD AUTO: 4.4 K/UL — SIGNIFICANT CHANGE UP (ref 1.8–7.4)
NEUTROPHILS NFR BLD AUTO: 61.1 % — SIGNIFICANT CHANGE UP (ref 43–77)
PLATELET # BLD AUTO: 299 K/UL — SIGNIFICANT CHANGE UP (ref 150–400)
PMV BLD: 11.3 FL — SIGNIFICANT CHANGE UP (ref 7–13)
POTASSIUM SERPL-MCNC: 4.4 MMOL/L — SIGNIFICANT CHANGE UP (ref 3.5–5.3)
POTASSIUM SERPL-SCNC: 4.4 MMOL/L — SIGNIFICANT CHANGE UP (ref 3.5–5.3)
PROT SERPL-MCNC: 7.8 G/DL — SIGNIFICANT CHANGE UP (ref 6–8.3)
PROTHROM AB SERPL-ACNC: 15.9 SEC — HIGH (ref 10–13.1)
RBC # BLD: 4.55 M/UL — SIGNIFICANT CHANGE UP (ref 4.2–5.8)
RBC # FLD: 15 % — HIGH (ref 10.3–14.5)
RH IG SCN BLD-IMP: POSITIVE — SIGNIFICANT CHANGE UP
SODIUM SERPL-SCNC: 141 MMOL/L — SIGNIFICANT CHANGE UP (ref 135–145)
WBC # BLD: 7.21 K/UL — SIGNIFICANT CHANGE UP (ref 3.8–10.5)
WBC # FLD AUTO: 7.21 K/UL — SIGNIFICANT CHANGE UP (ref 3.8–10.5)

## 2017-03-17 PROCEDURE — 93010 ELECTROCARDIOGRAM REPORT: CPT

## 2017-03-17 RX ORDER — FLUTICASONE PROPIONATE 50 MCG
1 SPRAY, SUSPENSION NASAL
Qty: 0 | Refills: 0 | COMMUNITY

## 2017-03-17 RX ORDER — SODIUM CHLORIDE 9 MG/ML
1000 INJECTION, SOLUTION INTRAVENOUS
Qty: 0 | Refills: 0 | Status: DISCONTINUED | OUTPATIENT
Start: 2017-03-20 | End: 2017-03-20

## 2017-03-17 RX ORDER — GABAPENTIN 400 MG/1
1 CAPSULE ORAL
Qty: 0 | Refills: 0 | COMMUNITY

## 2017-03-17 NOTE — H&P PST ADULT - HISTORY OF PRESENT ILLNESS
This is a 54 year old Cymro speaking  male with PMH of diabetes, hypertension, and HLD who presents to [facility] due to scheduled Right shoulder arthroscopy subacromial decompression rotator cuff repair possible nerve block on 6/1/9/14. Patient is feeling well today complaining only of intermittent pain in his right shoulder with numbness and tingling to his fingers.      # [id number] 56yr old  male with h/o 56yr old  male with h/o Type II diabetes and preop dx of Malignant neoplasm of pancreas presents to have PST eval for Diagnostic laparoscopy, Whipple scheduled on 3/20/2017. Patient is a poor historian. 56yr old  male with h/o Type II diabetes and preop dx of Malignant neoplasm of pancreas presents to have PST eval for Diagnostic laparoscopy, Whipple scheduled on 3/20/2017. Patient is a poor historian.  Patient was seen at [facility] due to recent onset of pruritus an jaundice and during evaluation was diagnosed with duodenal adenocarcinoma and was referred to Dr Sosa for further evaluation. ERCP was attempted and aborted due to blockage of ampulla by the mass.

## 2017-03-17 NOTE — H&P PST ADULT - PROBLEM SELECTOR PLAN 2
Instructed to take Lantus 32 units PM dose on 3/19/2017 and to hold Novolin AM dose on 3/20/2017.  Medical clearance requested.

## 2017-03-17 NOTE — H&P PST ADULT - NSANTHOSAYNRD_GEN_A_CORE
No. BROOKE screening performed.  STOP BANG Legend: 0-2 = LOW Risk; 3-4 = INTERMEDIATE Risk; 5-8 = HIGH Risk
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
## 2017-03-17 NOTE — H&P PST ADULT - GASTROINTESTINAL COMMENTS
occasional right J P drain draining bile . Patient states it drains 300ml /day right J P drain draining bile . Patient states it drains 300ml /day, Preop dx Malignant neoplasm of pancreas

## 2017-03-17 NOTE — H&P PST ADULT - NEGATIVE ALLERGY TYPES
no outdoor environmental allergies/no reactions to food/no reactions to medicines/no indoor environmental allergies/no reactions to animals/no reactions to insect bites

## 2017-03-17 NOTE — H&P PST ADULT - PROBLEM SELECTOR PLAN 1
Scheduled for Diagnostic laparoscopy whipple on 3/20/2017.  Preop instructions provided and pt verbalizes understanding.  Labs done and results pending.  Famotidine and Hibiclens provided with instructions.

## 2017-03-18 LAB — CANCER AG19-9 SERPL-ACNC: 7125.5 U/ML — HIGH

## 2017-03-19 ENCOUNTER — RESULT REVIEW (OUTPATIENT)
Age: 57
End: 2017-03-19

## 2017-03-20 ENCOUNTER — INPATIENT (INPATIENT)
Facility: HOSPITAL | Age: 57
LOS: 7 days | Discharge: HOME | End: 2017-03-28
Attending: SPECIALIST | Admitting: SPECIALIST
Payer: MEDICAID

## 2017-03-20 ENCOUNTER — APPOINTMENT (OUTPATIENT)
Dept: SURGICAL ONCOLOGY | Facility: HOSPITAL | Age: 57
End: 2017-03-20

## 2017-03-20 VITALS
SYSTOLIC BLOOD PRESSURE: 123 MMHG | RESPIRATION RATE: 18 BRPM | OXYGEN SATURATION: 99 % | HEART RATE: 96 BPM | DIASTOLIC BLOOD PRESSURE: 79 MMHG | TEMPERATURE: 99 F | HEIGHT: 64 IN | WEIGHT: 156.97 LBS

## 2017-03-20 DIAGNOSIS — C25.9 MALIGNANT NEOPLASM OF PANCREAS, UNSPECIFIED: Chronic | ICD-10-CM

## 2017-03-20 DIAGNOSIS — Z98.890 OTHER SPECIFIED POSTPROCEDURAL STATES: Chronic | ICD-10-CM

## 2017-03-20 DIAGNOSIS — Z96.89 PRESENCE OF OTHER SPECIFIED FUNCTIONAL IMPLANTS: Chronic | ICD-10-CM

## 2017-03-20 DIAGNOSIS — C25.9 MALIGNANT NEOPLASM OF PANCREAS, UNSPECIFIED: ICD-10-CM

## 2017-03-20 LAB
ALBUMIN SERPL ELPH-MCNC: 3.8 G/DL — SIGNIFICANT CHANGE UP (ref 3.3–5)
ALP SERPL-CCNC: 74 U/L — SIGNIFICANT CHANGE UP (ref 40–120)
ALT FLD-CCNC: 30 U/L — SIGNIFICANT CHANGE UP (ref 4–41)
APTT BLD: 24.9 SEC — LOW (ref 27.5–37.4)
APTT BLD: 28.7 SEC — SIGNIFICANT CHANGE UP (ref 27.5–37.4)
AST SERPL-CCNC: 27 U/L — SIGNIFICANT CHANGE UP (ref 4–40)
BASE EXCESS BLDA CALC-SCNC: 5.6 MMOL/L — SIGNIFICANT CHANGE UP
BILIRUB DIRECT SERPL-MCNC: 1.5 MG/DL — HIGH (ref 0.1–0.2)
BILIRUB SERPL-MCNC: 2.5 MG/DL — HIGH (ref 0.2–1.2)
BUN SERPL-MCNC: 17 MG/DL — SIGNIFICANT CHANGE UP (ref 7–23)
CA-I BLDA-SCNC: 1.18 MMOL/L — SIGNIFICANT CHANGE UP (ref 1.15–1.29)
CALCIUM SERPL-MCNC: 9 MG/DL — SIGNIFICANT CHANGE UP (ref 8.4–10.5)
CHLORIDE SERPL-SCNC: 94 MMOL/L — LOW (ref 98–107)
CO2 SERPL-SCNC: 28 MMOL/L — SIGNIFICANT CHANGE UP (ref 22–31)
CREAT SERPL-MCNC: 1.05 MG/DL — SIGNIFICANT CHANGE UP (ref 0.5–1.3)
GLUCOSE BLDA-MCNC: 258 MG/DL — HIGH (ref 70–99)
GLUCOSE SERPL-MCNC: 267 MG/DL — HIGH (ref 70–99)
HCO3 BLDA-SCNC: 30 MMOL/L — HIGH (ref 22–26)
HCT VFR BLD CALC: 31.6 % — LOW (ref 39–50)
HCT VFR BLDA CALC: 37.3 % — LOW (ref 39–51)
HGB BLD-MCNC: 11.3 G/DL — LOW (ref 13–17)
HGB BLDA-MCNC: 12.1 G/DL — LOW (ref 13–17)
INR BLD: 1.41 — HIGH (ref 0.88–1.17)
INR BLD: 1.63 — HIGH (ref 0.88–1.17)
MAGNESIUM SERPL-MCNC: 1.6 MG/DL — SIGNIFICANT CHANGE UP (ref 1.6–2.6)
MCHC RBC-ENTMCNC: 30.4 PG — SIGNIFICANT CHANGE UP (ref 27–34)
MCHC RBC-ENTMCNC: 35.8 % — SIGNIFICANT CHANGE UP (ref 32–36)
MCV RBC AUTO: 84.9 FL — SIGNIFICANT CHANGE UP (ref 80–100)
PCO2 BLDA: 35 MMHG — SIGNIFICANT CHANGE UP (ref 35–48)
PH BLDA: 7.53 PH — HIGH (ref 7.35–7.45)
PHOSPHATE SERPL-MCNC: 3 MG/DL — SIGNIFICANT CHANGE UP (ref 2.5–4.5)
PLATELET # BLD AUTO: 232 K/UL — SIGNIFICANT CHANGE UP (ref 150–400)
PMV BLD: 11.1 FL — SIGNIFICANT CHANGE UP (ref 7–13)
PO2 BLDA: 367 MMHG — HIGH (ref 83–108)
POTASSIUM BLDA-SCNC: 4.5 MMOL/L — SIGNIFICANT CHANGE UP (ref 3.4–4.5)
POTASSIUM SERPL-MCNC: 4 MMOL/L — SIGNIFICANT CHANGE UP (ref 3.5–5.3)
POTASSIUM SERPL-SCNC: 4 MMOL/L — SIGNIFICANT CHANGE UP (ref 3.5–5.3)
PROT SERPL-MCNC: 6.4 G/DL — SIGNIFICANT CHANGE UP (ref 6–8.3)
PROTHROM AB SERPL-ACNC: 15.9 SEC — HIGH (ref 9.8–13.1)
PROTHROM AB SERPL-ACNC: 18.4 SEC — HIGH (ref 9.8–13.1)
RBC # BLD: 3.72 M/UL — LOW (ref 4.2–5.8)
RBC # FLD: 14.1 % — SIGNIFICANT CHANGE UP (ref 10.3–14.5)
RH IG SCN BLD-IMP: POSITIVE — SIGNIFICANT CHANGE UP
SAO2 % BLDA: 100 % — HIGH (ref 95–99)
SODIUM BLDA-SCNC: 132 MMOL/L — LOW (ref 136–146)
SODIUM SERPL-SCNC: 136 MMOL/L — SIGNIFICANT CHANGE UP (ref 135–145)
WBC # BLD: 6.98 K/UL — SIGNIFICANT CHANGE UP (ref 3.8–10.5)
WBC # FLD AUTO: 6.98 K/UL — SIGNIFICANT CHANGE UP (ref 3.8–10.5)

## 2017-03-20 PROCEDURE — 88341 IMHCHEM/IMCYTCHM EA ADD ANTB: CPT | Mod: 26,59

## 2017-03-20 PROCEDURE — 88342 IMHCHEM/IMCYTCHM 1ST ANTB: CPT | Mod: 26,59

## 2017-03-20 PROCEDURE — 43820 GASTROJEJUNOSTOMY WO VAGOTMY: CPT

## 2017-03-20 PROCEDURE — 88331 PATH CONSLTJ SURG 1 BLK 1SPC: CPT | Mod: 26

## 2017-03-20 PROCEDURE — 88360 TUMOR IMMUNOHISTOCHEM/MANUAL: CPT | Mod: 26

## 2017-03-20 PROCEDURE — 88305 TISSUE EXAM BY PATHOLOGIST: CPT | Mod: 26

## 2017-03-20 PROCEDURE — 49203: CPT

## 2017-03-20 PROCEDURE — 44120 REMOVAL OF SMALL INTESTINE: CPT

## 2017-03-20 RX ORDER — SODIUM CHLORIDE 9 MG/ML
1000 INJECTION, SOLUTION INTRAVENOUS
Qty: 0 | Refills: 0 | Status: DISCONTINUED | OUTPATIENT
Start: 2017-03-20 | End: 2017-03-21

## 2017-03-20 RX ORDER — DIPHENHYDRAMINE HCL 50 MG
12.5 CAPSULE ORAL EVERY 4 HOURS
Qty: 0 | Refills: 0 | Status: DISCONTINUED | OUTPATIENT
Start: 2017-03-20 | End: 2017-03-27

## 2017-03-20 RX ORDER — SODIUM CHLORIDE 9 MG/ML
500 INJECTION, SOLUTION INTRAVENOUS ONCE
Qty: 0 | Refills: 0 | Status: COMPLETED | OUTPATIENT
Start: 2017-03-20 | End: 2017-03-20

## 2017-03-20 RX ORDER — HEPARIN SODIUM 5000 [USP'U]/ML
5000 INJECTION INTRAVENOUS; SUBCUTANEOUS ONCE
Qty: 0 | Refills: 0 | Status: COMPLETED | OUTPATIENT
Start: 2017-03-20 | End: 2017-03-20

## 2017-03-20 RX ORDER — PANTOPRAZOLE SODIUM 20 MG/1
40 TABLET, DELAYED RELEASE ORAL
Qty: 0 | Refills: 0 | Status: DISCONTINUED | OUTPATIENT
Start: 2017-03-20 | End: 2017-03-27

## 2017-03-20 RX ORDER — PANTOPRAZOLE SODIUM 20 MG/1
40 TABLET, DELAYED RELEASE ORAL DAILY
Qty: 0 | Refills: 0 | Status: DISCONTINUED | OUTPATIENT
Start: 2017-03-20 | End: 2017-03-20

## 2017-03-20 RX ORDER — HYDROMORPHONE HYDROCHLORIDE 2 MG/ML
0.5 INJECTION INTRAMUSCULAR; INTRAVENOUS; SUBCUTANEOUS
Qty: 0 | Refills: 0 | Status: DISCONTINUED | OUTPATIENT
Start: 2017-03-20 | End: 2017-03-27

## 2017-03-20 RX ORDER — NALOXONE HYDROCHLORIDE 4 MG/.1ML
0.1 SPRAY NASAL
Qty: 0 | Refills: 0 | Status: DISCONTINUED | OUTPATIENT
Start: 2017-03-20 | End: 2017-03-27

## 2017-03-20 RX ORDER — ONDANSETRON 8 MG/1
4 TABLET, FILM COATED ORAL EVERY 6 HOURS
Qty: 0 | Refills: 0 | Status: DISCONTINUED | OUTPATIENT
Start: 2017-03-20 | End: 2017-03-28

## 2017-03-20 RX ORDER — ONDANSETRON 8 MG/1
4 TABLET, FILM COATED ORAL ONCE
Qty: 0 | Refills: 0 | Status: COMPLETED | OUTPATIENT
Start: 2017-03-20 | End: 2017-03-20

## 2017-03-20 RX ORDER — DEXAMETHASONE 0.5 MG/5ML
4 ELIXIR ORAL EVERY 6 HOURS
Qty: 0 | Refills: 0 | Status: DISCONTINUED | OUTPATIENT
Start: 2017-03-20 | End: 2017-03-27

## 2017-03-20 RX ORDER — HYDROMORPHONE HYDROCHLORIDE 2 MG/ML
30 INJECTION INTRAMUSCULAR; INTRAVENOUS; SUBCUTANEOUS
Qty: 0 | Refills: 0 | Status: DISCONTINUED | OUTPATIENT
Start: 2017-03-20 | End: 2017-03-27

## 2017-03-20 RX ORDER — INSULIN LISPRO 100/ML
VIAL (ML) SUBCUTANEOUS EVERY 6 HOURS
Qty: 0 | Refills: 0 | Status: DISCONTINUED | OUTPATIENT
Start: 2017-03-20 | End: 2017-03-21

## 2017-03-20 RX ORDER — MAGNESIUM SULFATE 500 MG/ML
2 VIAL (ML) INJECTION ONCE
Qty: 0 | Refills: 0 | Status: COMPLETED | OUTPATIENT
Start: 2017-03-20 | End: 2017-03-20

## 2017-03-20 RX ORDER — HEPARIN SODIUM 5000 [USP'U]/ML
5000 INJECTION INTRAVENOUS; SUBCUTANEOUS EVERY 8 HOURS
Qty: 0 | Refills: 0 | Status: DISCONTINUED | OUTPATIENT
Start: 2017-03-20 | End: 2017-03-22

## 2017-03-20 RX ORDER — CEFOTETAN DISODIUM 1 G
2 VIAL (EA) INJECTION EVERY 12 HOURS
Qty: 0 | Refills: 0 | Status: COMPLETED | OUTPATIENT
Start: 2017-03-20 | End: 2017-03-21

## 2017-03-20 RX ORDER — HYDROMORPHONE HYDROCHLORIDE 2 MG/ML
0.5 INJECTION INTRAMUSCULAR; INTRAVENOUS; SUBCUTANEOUS
Qty: 0 | Refills: 0 | Status: DISCONTINUED | OUTPATIENT
Start: 2017-03-20 | End: 2017-03-21

## 2017-03-20 RX ADMIN — HYDROMORPHONE HYDROCHLORIDE 30 MILLILITER(S): 2 INJECTION INTRAMUSCULAR; INTRAVENOUS; SUBCUTANEOUS at 11:55

## 2017-03-20 RX ADMIN — SODIUM CHLORIDE 100 MILLILITER(S): 9 INJECTION, SOLUTION INTRAVENOUS at 21:20

## 2017-03-20 RX ADMIN — SODIUM CHLORIDE 1000 MILLILITER(S): 9 INJECTION, SOLUTION INTRAVENOUS at 23:39

## 2017-03-20 RX ADMIN — SODIUM CHLORIDE 100 MILLILITER(S): 9 INJECTION, SOLUTION INTRAVENOUS at 15:13

## 2017-03-20 RX ADMIN — ONDANSETRON 4 MILLIGRAM(S): 8 TABLET, FILM COATED ORAL at 11:45

## 2017-03-20 RX ADMIN — PANTOPRAZOLE SODIUM 40 MILLIGRAM(S): 20 TABLET, DELAYED RELEASE ORAL at 15:42

## 2017-03-20 RX ADMIN — Medication 2: at 18:05

## 2017-03-20 RX ADMIN — Medication 200 MILLIGRAM(S): at 12:00

## 2017-03-20 RX ADMIN — SODIUM CHLORIDE 1000 MILLILITER(S): 9 INJECTION, SOLUTION INTRAVENOUS at 23:07

## 2017-03-20 RX ADMIN — HEPARIN SODIUM 5000 UNIT(S): 5000 INJECTION INTRAVENOUS; SUBCUTANEOUS at 22:42

## 2017-03-20 RX ADMIN — Medication 50 GRAM(S): at 13:36

## 2017-03-20 RX ADMIN — HYDROMORPHONE HYDROCHLORIDE 30 MILLILITER(S): 2 INJECTION INTRAMUSCULAR; INTRAVENOUS; SUBCUTANEOUS at 15:16

## 2017-03-20 RX ADMIN — HEPARIN SODIUM 5000 UNIT(S): 5000 INJECTION INTRAVENOUS; SUBCUTANEOUS at 07:46

## 2017-03-20 RX ADMIN — Medication 6: at 12:40

## 2017-03-20 RX ADMIN — Medication 100 GRAM(S): at 19:50

## 2017-03-20 RX ADMIN — HYDROMORPHONE HYDROCHLORIDE 30 MILLILITER(S): 2 INJECTION INTRAMUSCULAR; INTRAVENOUS; SUBCUTANEOUS at 19:21

## 2017-03-20 RX ADMIN — SODIUM CHLORIDE 125 MILLILITER(S): 9 INJECTION, SOLUTION INTRAVENOUS at 23:32

## 2017-03-20 RX ADMIN — SODIUM CHLORIDE 30 MILLILITER(S): 9 INJECTION, SOLUTION INTRAVENOUS at 07:46

## 2017-03-20 RX ADMIN — HEPARIN SODIUM 5000 UNIT(S): 5000 INJECTION INTRAVENOUS; SUBCUTANEOUS at 15:13

## 2017-03-21 ENCOUNTER — TRANSCRIPTION ENCOUNTER (OUTPATIENT)
Age: 57
End: 2017-03-21

## 2017-03-21 LAB
ALBUMIN SERPL ELPH-MCNC: 3.4 G/DL — SIGNIFICANT CHANGE UP (ref 3.3–5)
ALP SERPL-CCNC: 64 U/L — SIGNIFICANT CHANGE UP (ref 40–120)
ALT FLD-CCNC: 26 U/L — SIGNIFICANT CHANGE UP (ref 4–41)
APTT BLD: 31.7 SEC — SIGNIFICANT CHANGE UP (ref 27.5–37.4)
AST SERPL-CCNC: 27 U/L — SIGNIFICANT CHANGE UP (ref 4–40)
BILIRUB DIRECT SERPL-MCNC: 1.3 MG/DL — HIGH (ref 0.1–0.2)
BILIRUB SERPL-MCNC: 2.3 MG/DL — HIGH (ref 0.2–1.2)
BUN SERPL-MCNC: 16 MG/DL — SIGNIFICANT CHANGE UP (ref 7–23)
BUN SERPL-MCNC: 18 MG/DL — SIGNIFICANT CHANGE UP (ref 7–23)
CALCIUM SERPL-MCNC: 8.9 MG/DL — SIGNIFICANT CHANGE UP (ref 8.4–10.5)
CALCIUM SERPL-MCNC: 8.9 MG/DL — SIGNIFICANT CHANGE UP (ref 8.4–10.5)
CHLORIDE SERPL-SCNC: 99 MMOL/L — SIGNIFICANT CHANGE UP (ref 98–107)
CHLORIDE SERPL-SCNC: 99 MMOL/L — SIGNIFICANT CHANGE UP (ref 98–107)
CO2 SERPL-SCNC: 29 MMOL/L — SIGNIFICANT CHANGE UP (ref 22–31)
CO2 SERPL-SCNC: 29 MMOL/L — SIGNIFICANT CHANGE UP (ref 22–31)
CREAT SERPL-MCNC: 1.16 MG/DL — SIGNIFICANT CHANGE UP (ref 0.5–1.3)
CREAT SERPL-MCNC: 1.24 MG/DL — SIGNIFICANT CHANGE UP (ref 0.5–1.3)
GLUCOSE SERPL-MCNC: 107 MG/DL — HIGH (ref 70–99)
GLUCOSE SERPL-MCNC: 112 MG/DL — HIGH (ref 70–99)
HCT VFR BLD CALC: 26.8 % — LOW (ref 39–50)
HCT VFR BLD CALC: 28.5 % — LOW (ref 39–50)
HCT VFR BLD CALC: 29 % — LOW (ref 39–50)
HGB BLD-MCNC: 10.2 G/DL — LOW (ref 13–17)
HGB BLD-MCNC: 10.4 G/DL — LOW (ref 13–17)
HGB BLD-MCNC: 9.4 G/DL — LOW (ref 13–17)
INR BLD: 1.49 — HIGH (ref 0.88–1.17)
MAGNESIUM SERPL-MCNC: 1.9 MG/DL — SIGNIFICANT CHANGE UP (ref 1.6–2.6)
MAGNESIUM SERPL-MCNC: 1.9 MG/DL — SIGNIFICANT CHANGE UP (ref 1.6–2.6)
MCHC RBC-ENTMCNC: 30.5 PG — SIGNIFICANT CHANGE UP (ref 27–34)
MCHC RBC-ENTMCNC: 30.6 PG — SIGNIFICANT CHANGE UP (ref 27–34)
MCHC RBC-ENTMCNC: 31.4 PG — SIGNIFICANT CHANGE UP (ref 27–34)
MCHC RBC-ENTMCNC: 35.1 % — SIGNIFICANT CHANGE UP (ref 32–36)
MCHC RBC-ENTMCNC: 35.2 % — SIGNIFICANT CHANGE UP (ref 32–36)
MCHC RBC-ENTMCNC: 36.5 % — HIGH (ref 32–36)
MCV RBC AUTO: 86.1 FL — SIGNIFICANT CHANGE UP (ref 80–100)
MCV RBC AUTO: 86.8 FL — SIGNIFICANT CHANGE UP (ref 80–100)
MCV RBC AUTO: 87.3 FL — SIGNIFICANT CHANGE UP (ref 80–100)
PHOSPHATE SERPL-MCNC: 3.6 MG/DL — SIGNIFICANT CHANGE UP (ref 2.5–4.5)
PHOSPHATE SERPL-MCNC: 3.8 MG/DL — SIGNIFICANT CHANGE UP (ref 2.5–4.5)
PLATELET # BLD AUTO: 185 K/UL — SIGNIFICANT CHANGE UP (ref 150–400)
PLATELET # BLD AUTO: 207 K/UL — SIGNIFICANT CHANGE UP (ref 150–400)
PLATELET # BLD AUTO: 224 K/UL — SIGNIFICANT CHANGE UP (ref 150–400)
PMV BLD: 10.9 FL — SIGNIFICANT CHANGE UP (ref 7–13)
PMV BLD: 11.1 FL — SIGNIFICANT CHANGE UP (ref 7–13)
PMV BLD: 11.2 FL — SIGNIFICANT CHANGE UP (ref 7–13)
POTASSIUM SERPL-MCNC: 4.6 MMOL/L — SIGNIFICANT CHANGE UP (ref 3.5–5.3)
POTASSIUM SERPL-MCNC: 5.2 MMOL/L — SIGNIFICANT CHANGE UP (ref 3.5–5.3)
POTASSIUM SERPL-SCNC: 4.6 MMOL/L — SIGNIFICANT CHANGE UP (ref 3.5–5.3)
POTASSIUM SERPL-SCNC: 5.2 MMOL/L — SIGNIFICANT CHANGE UP (ref 3.5–5.3)
PROT SERPL-MCNC: 5.8 G/DL — LOW (ref 6–8.3)
PROTHROM AB SERPL-ACNC: 16.8 SEC — HIGH (ref 9.8–13.1)
RBC # BLD: 3.07 M/UL — LOW (ref 4.2–5.8)
RBC # BLD: 3.31 M/UL — LOW (ref 4.2–5.8)
RBC # BLD: 3.34 M/UL — LOW (ref 4.2–5.8)
RBC # FLD: 14.2 % — SIGNIFICANT CHANGE UP (ref 10.3–14.5)
RBC # FLD: 14.3 % — SIGNIFICANT CHANGE UP (ref 10.3–14.5)
RBC # FLD: 14.4 % — SIGNIFICANT CHANGE UP (ref 10.3–14.5)
SODIUM SERPL-SCNC: 139 MMOL/L — SIGNIFICANT CHANGE UP (ref 135–145)
SODIUM SERPL-SCNC: 140 MMOL/L — SIGNIFICANT CHANGE UP (ref 135–145)
WBC # BLD: 10.26 K/UL — SIGNIFICANT CHANGE UP (ref 3.8–10.5)
WBC # BLD: 9.44 K/UL — SIGNIFICANT CHANGE UP (ref 3.8–10.5)
WBC # BLD: 9.83 K/UL — SIGNIFICANT CHANGE UP (ref 3.8–10.5)
WBC # FLD AUTO: 10.26 K/UL — SIGNIFICANT CHANGE UP (ref 3.8–10.5)
WBC # FLD AUTO: 9.44 K/UL — SIGNIFICANT CHANGE UP (ref 3.8–10.5)
WBC # FLD AUTO: 9.83 K/UL — SIGNIFICANT CHANGE UP (ref 3.8–10.5)

## 2017-03-21 PROCEDURE — 93010 ELECTROCARDIOGRAM REPORT: CPT

## 2017-03-21 RX ORDER — INSULIN LISPRO 100/ML
VIAL (ML) SUBCUTANEOUS
Qty: 0 | Refills: 0 | Status: DISCONTINUED | OUTPATIENT
Start: 2017-03-21 | End: 2017-03-22

## 2017-03-21 RX ORDER — PHYTONADIONE (VIT K1) 5 MG
5 TABLET ORAL ONCE
Qty: 0 | Refills: 0 | Status: COMPLETED | OUTPATIENT
Start: 2017-03-21 | End: 2017-03-21

## 2017-03-21 RX ORDER — SODIUM CHLORIDE 9 MG/ML
1000 INJECTION, SOLUTION INTRAVENOUS ONCE
Qty: 0 | Refills: 0 | Status: COMPLETED | OUTPATIENT
Start: 2017-03-21 | End: 2017-03-21

## 2017-03-21 RX ORDER — PHYTONADIONE (VIT K1) 5 MG
5 TABLET ORAL ONCE
Qty: 0 | Refills: 0 | Status: DISCONTINUED | OUTPATIENT
Start: 2017-03-21 | End: 2017-03-21

## 2017-03-21 RX ORDER — SODIUM CHLORIDE 9 MG/ML
1000 INJECTION, SOLUTION INTRAVENOUS
Qty: 0 | Refills: 0 | Status: DISCONTINUED | OUTPATIENT
Start: 2017-03-21 | End: 2017-03-22

## 2017-03-21 RX ADMIN — HEPARIN SODIUM 5000 UNIT(S): 5000 INJECTION INTRAVENOUS; SUBCUTANEOUS at 06:09

## 2017-03-21 RX ADMIN — HYDROMORPHONE HYDROCHLORIDE 30 MILLILITER(S): 2 INJECTION INTRAMUSCULAR; INTRAVENOUS; SUBCUTANEOUS at 07:10

## 2017-03-21 RX ADMIN — SODIUM CHLORIDE 2000 MILLILITER(S): 9 INJECTION, SOLUTION INTRAVENOUS at 01:53

## 2017-03-21 RX ADMIN — Medication 101 MILLIGRAM(S): at 02:27

## 2017-03-21 RX ADMIN — Medication 4: at 18:06

## 2017-03-21 RX ADMIN — HYDROMORPHONE HYDROCHLORIDE 30 MILLILITER(S): 2 INJECTION INTRAMUSCULAR; INTRAVENOUS; SUBCUTANEOUS at 19:06

## 2017-03-21 RX ADMIN — Medication 4: at 21:44

## 2017-03-21 RX ADMIN — SODIUM CHLORIDE 150 MILLILITER(S): 9 INJECTION, SOLUTION INTRAVENOUS at 19:44

## 2017-03-21 RX ADMIN — SODIUM CHLORIDE 150 MILLILITER(S): 9 INJECTION, SOLUTION INTRAVENOUS at 21:44

## 2017-03-21 RX ADMIN — SODIUM CHLORIDE 150 MILLILITER(S): 9 INJECTION, SOLUTION INTRAVENOUS at 09:56

## 2017-03-21 RX ADMIN — Medication 100 GRAM(S): at 07:56

## 2017-03-21 RX ADMIN — PANTOPRAZOLE SODIUM 40 MILLIGRAM(S): 20 TABLET, DELAYED RELEASE ORAL at 18:06

## 2017-03-21 RX ADMIN — HEPARIN SODIUM 5000 UNIT(S): 5000 INJECTION INTRAVENOUS; SUBCUTANEOUS at 22:16

## 2017-03-21 RX ADMIN — HEPARIN SODIUM 5000 UNIT(S): 5000 INJECTION INTRAVENOUS; SUBCUTANEOUS at 15:06

## 2017-03-21 RX ADMIN — PANTOPRAZOLE SODIUM 40 MILLIGRAM(S): 20 TABLET, DELAYED RELEASE ORAL at 05:15

## 2017-03-21 RX ADMIN — SODIUM CHLORIDE 125 MILLILITER(S): 9 INJECTION, SOLUTION INTRAVENOUS at 07:56

## 2017-03-21 RX ADMIN — Medication 4: at 12:42

## 2017-03-22 LAB
BUN SERPL-MCNC: 12 MG/DL — SIGNIFICANT CHANGE UP (ref 7–23)
CALCIUM SERPL-MCNC: 8.8 MG/DL — SIGNIFICANT CHANGE UP (ref 8.4–10.5)
CHLORIDE SERPL-SCNC: 95 MMOL/L — LOW (ref 98–107)
CO2 SERPL-SCNC: 27 MMOL/L — SIGNIFICANT CHANGE UP (ref 22–31)
CREAT SERPL-MCNC: 0.94 MG/DL — SIGNIFICANT CHANGE UP (ref 0.5–1.3)
GLUCOSE SERPL-MCNC: 298 MG/DL — HIGH (ref 70–99)
HCT VFR BLD CALC: 25.6 % — LOW (ref 39–50)
HCT VFR BLD CALC: 26.2 % — LOW (ref 39–50)
HGB BLD-MCNC: 8.9 G/DL — LOW (ref 13–17)
HGB BLD-MCNC: 9.1 G/DL — LOW (ref 13–17)
MAGNESIUM SERPL-MCNC: 1.6 MG/DL — SIGNIFICANT CHANGE UP (ref 1.6–2.6)
MCHC RBC-ENTMCNC: 29.9 PG — SIGNIFICANT CHANGE UP (ref 27–34)
MCHC RBC-ENTMCNC: 30.2 PG — SIGNIFICANT CHANGE UP (ref 27–34)
MCHC RBC-ENTMCNC: 34.7 % — SIGNIFICANT CHANGE UP (ref 32–36)
MCHC RBC-ENTMCNC: 34.8 % — SIGNIFICANT CHANGE UP (ref 32–36)
MCV RBC AUTO: 86.2 FL — SIGNIFICANT CHANGE UP (ref 80–100)
MCV RBC AUTO: 86.8 FL — SIGNIFICANT CHANGE UP (ref 80–100)
PHOSPHATE SERPL-MCNC: 3.2 MG/DL — SIGNIFICANT CHANGE UP (ref 2.5–4.5)
PLATELET # BLD AUTO: 167 K/UL — SIGNIFICANT CHANGE UP (ref 150–400)
PLATELET # BLD AUTO: 180 K/UL — SIGNIFICANT CHANGE UP (ref 150–400)
PMV BLD: 10.6 FL — SIGNIFICANT CHANGE UP (ref 7–13)
PMV BLD: 10.7 FL — SIGNIFICANT CHANGE UP (ref 7–13)
POTASSIUM SERPL-MCNC: 4.4 MMOL/L — SIGNIFICANT CHANGE UP (ref 3.5–5.3)
POTASSIUM SERPL-SCNC: 4.4 MMOL/L — SIGNIFICANT CHANGE UP (ref 3.5–5.3)
RBC # BLD: 2.95 M/UL — LOW (ref 4.2–5.8)
RBC # BLD: 3.04 M/UL — LOW (ref 4.2–5.8)
RBC # FLD: 13.7 % — SIGNIFICANT CHANGE UP (ref 10.3–14.5)
RBC # FLD: 14 % — SIGNIFICANT CHANGE UP (ref 10.3–14.5)
SODIUM SERPL-SCNC: 133 MMOL/L — LOW (ref 135–145)
WBC # BLD: 8.46 K/UL — SIGNIFICANT CHANGE UP (ref 3.8–10.5)
WBC # BLD: 9.15 K/UL — SIGNIFICANT CHANGE UP (ref 3.8–10.5)
WBC # FLD AUTO: 8.46 K/UL — SIGNIFICANT CHANGE UP (ref 3.8–10.5)
WBC # FLD AUTO: 9.15 K/UL — SIGNIFICANT CHANGE UP (ref 3.8–10.5)

## 2017-03-22 RX ORDER — INSULIN LISPRO 100/ML
VIAL (ML) SUBCUTANEOUS
Qty: 0 | Refills: 0 | Status: DISCONTINUED | OUTPATIENT
Start: 2017-03-22 | End: 2017-03-22

## 2017-03-22 RX ORDER — INSULIN GLARGINE 100 [IU]/ML
20 INJECTION, SOLUTION SUBCUTANEOUS AT BEDTIME
Qty: 0 | Refills: 0 | Status: DISCONTINUED | OUTPATIENT
Start: 2017-03-22 | End: 2017-03-23

## 2017-03-22 RX ORDER — INSULIN LISPRO 100/ML
VIAL (ML) SUBCUTANEOUS
Qty: 0 | Refills: 0 | Status: DISCONTINUED | OUTPATIENT
Start: 2017-03-22 | End: 2017-03-23

## 2017-03-22 RX ORDER — MAGNESIUM SULFATE 500 MG/ML
2 VIAL (ML) INJECTION ONCE
Qty: 0 | Refills: 0 | Status: COMPLETED | OUTPATIENT
Start: 2017-03-22 | End: 2017-03-22

## 2017-03-22 RX ORDER — SODIUM CHLORIDE 9 MG/ML
1000 INJECTION INTRAMUSCULAR; INTRAVENOUS; SUBCUTANEOUS
Qty: 0 | Refills: 0 | Status: DISCONTINUED | OUTPATIENT
Start: 2017-03-22 | End: 2017-03-23

## 2017-03-22 RX ORDER — DEXTROSE MONOHYDRATE, SODIUM CHLORIDE, AND POTASSIUM CHLORIDE 50; .745; 4.5 G/1000ML; G/1000ML; G/1000ML
1000 INJECTION, SOLUTION INTRAVENOUS
Qty: 0 | Refills: 0 | Status: DISCONTINUED | OUTPATIENT
Start: 2017-03-22 | End: 2017-03-22

## 2017-03-22 RX ORDER — ENOXAPARIN SODIUM 100 MG/ML
40 INJECTION SUBCUTANEOUS DAILY
Qty: 0 | Refills: 0 | Status: DISCONTINUED | OUTPATIENT
Start: 2017-03-22 | End: 2017-03-28

## 2017-03-22 RX ADMIN — SODIUM CHLORIDE 150 MILLILITER(S): 9 INJECTION, SOLUTION INTRAVENOUS at 07:22

## 2017-03-22 RX ADMIN — Medication 5: at 12:17

## 2017-03-22 RX ADMIN — PANTOPRAZOLE SODIUM 40 MILLIGRAM(S): 20 TABLET, DELAYED RELEASE ORAL at 17:32

## 2017-03-22 RX ADMIN — HEPARIN SODIUM 5000 UNIT(S): 5000 INJECTION INTRAVENOUS; SUBCUTANEOUS at 05:32

## 2017-03-22 RX ADMIN — Medication 3: at 17:32

## 2017-03-22 RX ADMIN — ENOXAPARIN SODIUM 40 MILLIGRAM(S): 100 INJECTION SUBCUTANEOUS at 12:17

## 2017-03-22 RX ADMIN — Medication 9: at 09:07

## 2017-03-22 RX ADMIN — Medication 50 GRAM(S): at 09:07

## 2017-03-22 RX ADMIN — Medication 3: at 23:15

## 2017-03-22 RX ADMIN — SODIUM CHLORIDE 150 MILLILITER(S): 9 INJECTION INTRAMUSCULAR; INTRAVENOUS; SUBCUTANEOUS at 09:07

## 2017-03-22 RX ADMIN — PANTOPRAZOLE SODIUM 40 MILLIGRAM(S): 20 TABLET, DELAYED RELEASE ORAL at 05:31

## 2017-03-22 RX ADMIN — INSULIN GLARGINE 20 UNIT(S): 100 INJECTION, SOLUTION SUBCUTANEOUS at 23:14

## 2017-03-22 RX ADMIN — HYDROMORPHONE HYDROCHLORIDE 30 MILLILITER(S): 2 INJECTION INTRAMUSCULAR; INTRAVENOUS; SUBCUTANEOUS at 07:23

## 2017-03-22 RX ADMIN — HYDROMORPHONE HYDROCHLORIDE 30 MILLILITER(S): 2 INJECTION INTRAMUSCULAR; INTRAVENOUS; SUBCUTANEOUS at 19:18

## 2017-03-22 RX ADMIN — SODIUM CHLORIDE 150 MILLILITER(S): 9 INJECTION INTRAMUSCULAR; INTRAVENOUS; SUBCUTANEOUS at 19:32

## 2017-03-23 LAB
APTT BLD: 30.4 SEC — SIGNIFICANT CHANGE UP (ref 27.5–37.4)
BLD GP AB SCN SERPL QL: NEGATIVE — SIGNIFICANT CHANGE UP
BUN SERPL-MCNC: 13 MG/DL — SIGNIFICANT CHANGE UP (ref 7–23)
BUN SERPL-MCNC: 13 MG/DL — SIGNIFICANT CHANGE UP (ref 7–23)
CALCIUM SERPL-MCNC: 8.5 MG/DL — SIGNIFICANT CHANGE UP (ref 8.4–10.5)
CALCIUM SERPL-MCNC: 8.7 MG/DL — SIGNIFICANT CHANGE UP (ref 8.4–10.5)
CHLORIDE SERPL-SCNC: 101 MMOL/L — SIGNIFICANT CHANGE UP (ref 98–107)
CHLORIDE SERPL-SCNC: 97 MMOL/L — LOW (ref 98–107)
CO2 SERPL-SCNC: 21 MMOL/L — LOW (ref 22–31)
CO2 SERPL-SCNC: 22 MMOL/L — SIGNIFICANT CHANGE UP (ref 22–31)
CREAT SERPL-MCNC: 0.81 MG/DL — SIGNIFICANT CHANGE UP (ref 0.5–1.3)
CREAT SERPL-MCNC: 0.88 MG/DL — SIGNIFICANT CHANGE UP (ref 0.5–1.3)
GLUCOSE SERPL-MCNC: 243 MG/DL — HIGH (ref 70–99)
GLUCOSE SERPL-MCNC: 245 MG/DL — HIGH (ref 70–99)
HCT VFR BLD CALC: 25.8 % — LOW (ref 39–50)
HGB BLD-MCNC: 9.1 G/DL — LOW (ref 13–17)
INR BLD: 1.27 — HIGH (ref 0.88–1.17)
MAGNESIUM SERPL-MCNC: 1.6 MG/DL — SIGNIFICANT CHANGE UP (ref 1.6–2.6)
MCHC RBC-ENTMCNC: 30.4 PG — SIGNIFICANT CHANGE UP (ref 27–34)
MCHC RBC-ENTMCNC: 35.3 % — SIGNIFICANT CHANGE UP (ref 32–36)
MCV RBC AUTO: 86.3 FL — SIGNIFICANT CHANGE UP (ref 80–100)
PHOSPHATE SERPL-MCNC: 2.2 MG/DL — LOW (ref 2.5–4.5)
PLATELET # BLD AUTO: 210 K/UL — SIGNIFICANT CHANGE UP (ref 150–400)
PMV BLD: 10.6 FL — SIGNIFICANT CHANGE UP (ref 7–13)
POTASSIUM SERPL-MCNC: 4.2 MMOL/L — SIGNIFICANT CHANGE UP (ref 3.5–5.3)
POTASSIUM SERPL-MCNC: 4.3 MMOL/L — SIGNIFICANT CHANGE UP (ref 3.5–5.3)
POTASSIUM SERPL-SCNC: 4.2 MMOL/L — SIGNIFICANT CHANGE UP (ref 3.5–5.3)
POTASSIUM SERPL-SCNC: 4.3 MMOL/L — SIGNIFICANT CHANGE UP (ref 3.5–5.3)
PROTHROM AB SERPL-ACNC: 14.3 SEC — HIGH (ref 9.8–13.1)
RBC # BLD: 2.99 M/UL — LOW (ref 4.2–5.8)
RBC # FLD: 13.9 % — SIGNIFICANT CHANGE UP (ref 10.3–14.5)
RH IG SCN BLD-IMP: POSITIVE — SIGNIFICANT CHANGE UP
SODIUM SERPL-SCNC: 137 MMOL/L — SIGNIFICANT CHANGE UP (ref 135–145)
SODIUM SERPL-SCNC: 137 MMOL/L — SIGNIFICANT CHANGE UP (ref 135–145)
WBC # BLD: 7.67 K/UL — SIGNIFICANT CHANGE UP (ref 3.8–10.5)
WBC # FLD AUTO: 7.67 K/UL — SIGNIFICANT CHANGE UP (ref 3.8–10.5)

## 2017-03-23 PROCEDURE — 74000: CPT | Mod: 26

## 2017-03-23 RX ORDER — INSULIN GLARGINE 100 [IU]/ML
32 INJECTION, SOLUTION SUBCUTANEOUS AT BEDTIME
Qty: 0 | Refills: 0 | Status: DISCONTINUED | OUTPATIENT
Start: 2017-03-23 | End: 2017-03-24

## 2017-03-23 RX ORDER — SODIUM CHLORIDE 9 MG/ML
1000 INJECTION, SOLUTION INTRAVENOUS ONCE
Qty: 0 | Refills: 0 | Status: COMPLETED | OUTPATIENT
Start: 2017-03-23 | End: 2017-03-23

## 2017-03-23 RX ORDER — DEXTROSE MONOHYDRATE, SODIUM CHLORIDE, AND POTASSIUM CHLORIDE 50; .745; 4.5 G/1000ML; G/1000ML; G/1000ML
1000 INJECTION, SOLUTION INTRAVENOUS
Qty: 0 | Refills: 0 | Status: DISCONTINUED | OUTPATIENT
Start: 2017-03-23 | End: 2017-03-27

## 2017-03-23 RX ORDER — ENOXAPARIN SODIUM 100 MG/ML
40 INJECTION SUBCUTANEOUS
Qty: 1120 | Refills: 0 | OUTPATIENT
Start: 2017-03-23 | End: 2017-04-20

## 2017-03-23 RX ORDER — SODIUM CHLORIDE 9 MG/ML
1000 INJECTION INTRAMUSCULAR; INTRAVENOUS; SUBCUTANEOUS ONCE
Qty: 0 | Refills: 0 | Status: COMPLETED | OUTPATIENT
Start: 2017-03-23 | End: 2017-03-23

## 2017-03-23 RX ORDER — INSULIN GLARGINE 100 [IU]/ML
48 INJECTION, SOLUTION SUBCUTANEOUS AT BEDTIME
Qty: 0 | Refills: 0 | Status: DISCONTINUED | OUTPATIENT
Start: 2017-03-23 | End: 2017-03-23

## 2017-03-23 RX ORDER — INSULIN LISPRO 100/ML
VIAL (ML) SUBCUTANEOUS EVERY 6 HOURS
Qty: 0 | Refills: 0 | Status: DISCONTINUED | OUTPATIENT
Start: 2017-03-23 | End: 2017-03-26

## 2017-03-23 RX ORDER — MAGNESIUM SULFATE 500 MG/ML
2 VIAL (ML) INJECTION ONCE
Qty: 0 | Refills: 0 | Status: COMPLETED | OUTPATIENT
Start: 2017-03-23 | End: 2017-03-23

## 2017-03-23 RX ADMIN — ONDANSETRON 4 MILLIGRAM(S): 8 TABLET, FILM COATED ORAL at 00:49

## 2017-03-23 RX ADMIN — Medication 7: at 17:57

## 2017-03-23 RX ADMIN — ENOXAPARIN SODIUM 40 MILLIGRAM(S): 100 INJECTION SUBCUTANEOUS at 12:28

## 2017-03-23 RX ADMIN — PANTOPRAZOLE SODIUM 40 MILLIGRAM(S): 20 TABLET, DELAYED RELEASE ORAL at 05:49

## 2017-03-23 RX ADMIN — HYDROMORPHONE HYDROCHLORIDE 30 MILLILITER(S): 2 INJECTION INTRAMUSCULAR; INTRAVENOUS; SUBCUTANEOUS at 07:15

## 2017-03-23 RX ADMIN — PANTOPRAZOLE SODIUM 40 MILLIGRAM(S): 20 TABLET, DELAYED RELEASE ORAL at 17:57

## 2017-03-23 RX ADMIN — SODIUM CHLORIDE 2000 MILLILITER(S): 9 INJECTION INTRAMUSCULAR; INTRAVENOUS; SUBCUTANEOUS at 01:36

## 2017-03-23 RX ADMIN — SODIUM CHLORIDE 2000 MILLILITER(S): 9 INJECTION, SOLUTION INTRAVENOUS at 05:49

## 2017-03-23 RX ADMIN — Medication 50 GRAM(S): at 11:21

## 2017-03-23 RX ADMIN — INSULIN GLARGINE 32 UNIT(S): 100 INJECTION, SOLUTION SUBCUTANEOUS at 22:38

## 2017-03-23 RX ADMIN — SODIUM CHLORIDE 150 MILLILITER(S): 9 INJECTION INTRAMUSCULAR; INTRAVENOUS; SUBCUTANEOUS at 09:09

## 2017-03-23 RX ADMIN — Medication 5: at 09:36

## 2017-03-23 RX ADMIN — HYDROMORPHONE HYDROCHLORIDE 30 MILLILITER(S): 2 INJECTION INTRAMUSCULAR; INTRAVENOUS; SUBCUTANEOUS at 19:11

## 2017-03-23 RX ADMIN — Medication 5: at 12:58

## 2017-03-23 RX ADMIN — Medication 63.75 MILLIMOLE(S): at 12:21

## 2017-03-23 RX ADMIN — DEXTROSE MONOHYDRATE, SODIUM CHLORIDE, AND POTASSIUM CHLORIDE 125 MILLILITER(S): 50; .745; 4.5 INJECTION, SOLUTION INTRAVENOUS at 22:37

## 2017-03-23 RX ADMIN — SODIUM CHLORIDE 1000 MILLILITER(S): 9 INJECTION, SOLUTION INTRAVENOUS at 11:03

## 2017-03-23 RX ADMIN — DEXTROSE MONOHYDRATE, SODIUM CHLORIDE, AND POTASSIUM CHLORIDE 125 MILLILITER(S): 50; .745; 4.5 INJECTION, SOLUTION INTRAVENOUS at 11:21

## 2017-03-23 RX ADMIN — Medication 7: at 23:53

## 2017-03-23 NOTE — PROVIDER CONTACT NOTE (OTHER) - ASSESSMENT
Emesis 225 mL of brown thin secretions, urine output decreased from Vazquez catheter and timmy color Emesis 225 mL of brown thin secretions, urine output decreased from Vazquez catheter and timmy color, pt denies Chest pain/palpitations

## 2017-03-24 LAB
ALBUMIN SERPL ELPH-MCNC: 3 G/DL — LOW (ref 3.3–5)
ALP SERPL-CCNC: 63 U/L — SIGNIFICANT CHANGE UP (ref 40–120)
ALT FLD-CCNC: 17 U/L — SIGNIFICANT CHANGE UP (ref 4–41)
APTT BLD: 27.7 SEC — SIGNIFICANT CHANGE UP (ref 27.5–37.4)
AST SERPL-CCNC: 18 U/L — SIGNIFICANT CHANGE UP (ref 4–40)
BILIRUB DIRECT SERPL-MCNC: 1 MG/DL — HIGH (ref 0.1–0.2)
BILIRUB SERPL-MCNC: 1.7 MG/DL — HIGH (ref 0.2–1.2)
BUN SERPL-MCNC: 10 MG/DL — SIGNIFICANT CHANGE UP (ref 7–23)
CALCIUM SERPL-MCNC: 8.7 MG/DL — SIGNIFICANT CHANGE UP (ref 8.4–10.5)
CHLORIDE SERPL-SCNC: 102 MMOL/L — SIGNIFICANT CHANGE UP (ref 98–107)
CO2 SERPL-SCNC: 24 MMOL/L — SIGNIFICANT CHANGE UP (ref 22–31)
CREAT SERPL-MCNC: 0.65 MG/DL — SIGNIFICANT CHANGE UP (ref 0.5–1.3)
GLUCOSE SERPL-MCNC: 225 MG/DL — HIGH (ref 70–99)
HCT VFR BLD CALC: 23.5 % — LOW (ref 39–50)
HCT VFR BLD CALC: 24.1 % — LOW (ref 39–50)
HGB BLD-MCNC: 8.1 G/DL — LOW (ref 13–17)
HGB BLD-MCNC: 8.4 G/DL — LOW (ref 13–17)
INR BLD: 1.29 — HIGH (ref 0.88–1.17)
MAGNESIUM SERPL-MCNC: 1.6 MG/DL — SIGNIFICANT CHANGE UP (ref 1.6–2.6)
MCHC RBC-ENTMCNC: 29.6 PG — SIGNIFICANT CHANGE UP (ref 27–34)
MCHC RBC-ENTMCNC: 30.1 PG — SIGNIFICANT CHANGE UP (ref 27–34)
MCHC RBC-ENTMCNC: 34.5 % — SIGNIFICANT CHANGE UP (ref 32–36)
MCHC RBC-ENTMCNC: 34.9 % — SIGNIFICANT CHANGE UP (ref 32–36)
MCV RBC AUTO: 85.8 FL — SIGNIFICANT CHANGE UP (ref 80–100)
MCV RBC AUTO: 86.4 FL — SIGNIFICANT CHANGE UP (ref 80–100)
PHOSPHATE SERPL-MCNC: 1.7 MG/DL — LOW (ref 2.5–4.5)
PLATELET # BLD AUTO: 239 K/UL — SIGNIFICANT CHANGE UP (ref 150–400)
PLATELET # BLD AUTO: 243 K/UL — SIGNIFICANT CHANGE UP (ref 150–400)
PMV BLD: 10.1 FL — SIGNIFICANT CHANGE UP (ref 7–13)
PMV BLD: 9.8 FL — SIGNIFICANT CHANGE UP (ref 7–13)
POTASSIUM SERPL-MCNC: 4.5 MMOL/L — SIGNIFICANT CHANGE UP (ref 3.5–5.3)
POTASSIUM SERPL-SCNC: 4.5 MMOL/L — SIGNIFICANT CHANGE UP (ref 3.5–5.3)
PROT SERPL-MCNC: 5.7 G/DL — LOW (ref 6–8.3)
PROTHROM AB SERPL-ACNC: 14.5 SEC — HIGH (ref 9.8–13.1)
RBC # BLD: 2.74 M/UL — LOW (ref 4.2–5.8)
RBC # BLD: 2.79 M/UL — LOW (ref 4.2–5.8)
RBC # FLD: 13.6 % — SIGNIFICANT CHANGE UP (ref 10.3–14.5)
RBC # FLD: 14 % — SIGNIFICANT CHANGE UP (ref 10.3–14.5)
SODIUM SERPL-SCNC: 138 MMOL/L — SIGNIFICANT CHANGE UP (ref 135–145)
WBC # BLD: 5.08 K/UL — SIGNIFICANT CHANGE UP (ref 3.8–10.5)
WBC # BLD: 5.21 K/UL — SIGNIFICANT CHANGE UP (ref 3.8–10.5)
WBC # FLD AUTO: 5.08 K/UL — SIGNIFICANT CHANGE UP (ref 3.8–10.5)
WBC # FLD AUTO: 5.21 K/UL — SIGNIFICANT CHANGE UP (ref 3.8–10.5)

## 2017-03-24 RX ORDER — INSULIN GLARGINE 100 [IU]/ML
38 INJECTION, SOLUTION SUBCUTANEOUS AT BEDTIME
Qty: 0 | Refills: 0 | Status: DISCONTINUED | OUTPATIENT
Start: 2017-03-24 | End: 2017-03-28

## 2017-03-24 RX ORDER — MAGNESIUM SULFATE 500 MG/ML
2 VIAL (ML) INJECTION ONCE
Qty: 0 | Refills: 0 | Status: COMPLETED | OUTPATIENT
Start: 2017-03-24 | End: 2017-03-24

## 2017-03-24 RX ADMIN — PANTOPRAZOLE SODIUM 40 MILLIGRAM(S): 20 TABLET, DELAYED RELEASE ORAL at 05:20

## 2017-03-24 RX ADMIN — DEXTROSE MONOHYDRATE, SODIUM CHLORIDE, AND POTASSIUM CHLORIDE 125 MILLILITER(S): 50; .745; 4.5 INJECTION, SOLUTION INTRAVENOUS at 07:15

## 2017-03-24 RX ADMIN — Medication 50 GRAM(S): at 12:10

## 2017-03-24 RX ADMIN — Medication 3: at 18:02

## 2017-03-24 RX ADMIN — Medication 63.75 MILLIMOLE(S): at 14:11

## 2017-03-24 RX ADMIN — Medication 63.75 MILLIMOLE(S): at 09:24

## 2017-03-24 RX ADMIN — Medication 5: at 06:04

## 2017-03-24 RX ADMIN — ONDANSETRON 4 MILLIGRAM(S): 8 TABLET, FILM COATED ORAL at 09:32

## 2017-03-24 RX ADMIN — DEXTROSE MONOHYDRATE, SODIUM CHLORIDE, AND POTASSIUM CHLORIDE 125 MILLILITER(S): 50; .745; 4.5 INJECTION, SOLUTION INTRAVENOUS at 19:24

## 2017-03-24 RX ADMIN — ENOXAPARIN SODIUM 40 MILLIGRAM(S): 100 INJECTION SUBCUTANEOUS at 12:10

## 2017-03-24 RX ADMIN — HYDROMORPHONE HYDROCHLORIDE 30 MILLILITER(S): 2 INJECTION INTRAMUSCULAR; INTRAVENOUS; SUBCUTANEOUS at 19:23

## 2017-03-24 RX ADMIN — HYDROMORPHONE HYDROCHLORIDE 30 MILLILITER(S): 2 INJECTION INTRAMUSCULAR; INTRAVENOUS; SUBCUTANEOUS at 07:11

## 2017-03-24 RX ADMIN — Medication 7: at 12:10

## 2017-03-24 RX ADMIN — PANTOPRAZOLE SODIUM 40 MILLIGRAM(S): 20 TABLET, DELAYED RELEASE ORAL at 18:02

## 2017-03-24 RX ADMIN — INSULIN GLARGINE 38 UNIT(S): 100 INJECTION, SOLUTION SUBCUTANEOUS at 21:43

## 2017-03-25 LAB
ALBUMIN SERPL ELPH-MCNC: 2.9 G/DL — LOW (ref 3.3–5)
ALP SERPL-CCNC: 75 U/L — SIGNIFICANT CHANGE UP (ref 40–120)
ALT FLD-CCNC: 17 U/L — SIGNIFICANT CHANGE UP (ref 4–41)
AST SERPL-CCNC: 19 U/L — SIGNIFICANT CHANGE UP (ref 4–40)
BILIRUB DIRECT SERPL-MCNC: 0.8 MG/DL — HIGH (ref 0.1–0.2)
BILIRUB SERPL-MCNC: 1.4 MG/DL — HIGH (ref 0.2–1.2)
BUN SERPL-MCNC: 4 MG/DL — LOW (ref 7–23)
CALCIUM SERPL-MCNC: 8.8 MG/DL — SIGNIFICANT CHANGE UP (ref 8.4–10.5)
CHLORIDE SERPL-SCNC: 104 MMOL/L — SIGNIFICANT CHANGE UP (ref 98–107)
CO2 SERPL-SCNC: 24 MMOL/L — SIGNIFICANT CHANGE UP (ref 22–31)
CREAT SERPL-MCNC: 0.64 MG/DL — SIGNIFICANT CHANGE UP (ref 0.5–1.3)
GLUCOSE SERPL-MCNC: 148 MG/DL — HIGH (ref 70–99)
HCT VFR BLD CALC: 25.5 % — LOW (ref 39–50)
HGB BLD-MCNC: 9 G/DL — LOW (ref 13–17)
MAGNESIUM SERPL-MCNC: 1.9 MG/DL — SIGNIFICANT CHANGE UP (ref 1.6–2.6)
MCHC RBC-ENTMCNC: 30.5 PG — SIGNIFICANT CHANGE UP (ref 27–34)
MCHC RBC-ENTMCNC: 35.3 % — SIGNIFICANT CHANGE UP (ref 32–36)
MCV RBC AUTO: 86.4 FL — SIGNIFICANT CHANGE UP (ref 80–100)
PHOSPHATE SERPL-MCNC: 3.7 MG/DL — SIGNIFICANT CHANGE UP (ref 2.5–4.5)
PLATELET # BLD AUTO: 276 K/UL — SIGNIFICANT CHANGE UP (ref 150–400)
PMV BLD: 10.3 FL — SIGNIFICANT CHANGE UP (ref 7–13)
POTASSIUM SERPL-MCNC: 4.6 MMOL/L — SIGNIFICANT CHANGE UP (ref 3.5–5.3)
POTASSIUM SERPL-SCNC: 4.6 MMOL/L — SIGNIFICANT CHANGE UP (ref 3.5–5.3)
PROT SERPL-MCNC: 5.7 G/DL — LOW (ref 6–8.3)
RBC # BLD: 2.95 M/UL — LOW (ref 4.2–5.8)
RBC # FLD: 13.9 % — SIGNIFICANT CHANGE UP (ref 10.3–14.5)
SODIUM SERPL-SCNC: 139 MMOL/L — SIGNIFICANT CHANGE UP (ref 135–145)
WBC # BLD: 4.54 K/UL — SIGNIFICANT CHANGE UP (ref 3.8–10.5)
WBC # FLD AUTO: 4.54 K/UL — SIGNIFICANT CHANGE UP (ref 3.8–10.5)

## 2017-03-25 RX ORDER — MAGNESIUM SULFATE 500 MG/ML
1 VIAL (ML) INJECTION ONCE
Qty: 0 | Refills: 0 | Status: COMPLETED | OUTPATIENT
Start: 2017-03-25 | End: 2017-03-25

## 2017-03-25 RX ADMIN — Medication 100 GRAM(S): at 09:48

## 2017-03-25 RX ADMIN — HYDROMORPHONE HYDROCHLORIDE 30 MILLILITER(S): 2 INJECTION INTRAMUSCULAR; INTRAVENOUS; SUBCUTANEOUS at 19:13

## 2017-03-25 RX ADMIN — INSULIN GLARGINE 38 UNIT(S): 100 INJECTION, SOLUTION SUBCUTANEOUS at 21:18

## 2017-03-25 RX ADMIN — ENOXAPARIN SODIUM 40 MILLIGRAM(S): 100 INJECTION SUBCUTANEOUS at 12:38

## 2017-03-25 RX ADMIN — Medication 3: at 12:38

## 2017-03-25 RX ADMIN — HYDROMORPHONE HYDROCHLORIDE 30 MILLILITER(S): 2 INJECTION INTRAMUSCULAR; INTRAVENOUS; SUBCUTANEOUS at 07:30

## 2017-03-25 RX ADMIN — PANTOPRAZOLE SODIUM 40 MILLIGRAM(S): 20 TABLET, DELAYED RELEASE ORAL at 17:23

## 2017-03-25 RX ADMIN — Medication 3: at 17:23

## 2017-03-25 RX ADMIN — DEXTROSE MONOHYDRATE, SODIUM CHLORIDE, AND POTASSIUM CHLORIDE 125 MILLILITER(S): 50; .745; 4.5 INJECTION, SOLUTION INTRAVENOUS at 09:52

## 2017-03-25 RX ADMIN — Medication 3: at 00:43

## 2017-03-25 RX ADMIN — DEXTROSE MONOHYDRATE, SODIUM CHLORIDE, AND POTASSIUM CHLORIDE 125 MILLILITER(S): 50; .745; 4.5 INJECTION, SOLUTION INTRAVENOUS at 19:36

## 2017-03-25 RX ADMIN — PANTOPRAZOLE SODIUM 40 MILLIGRAM(S): 20 TABLET, DELAYED RELEASE ORAL at 05:45

## 2017-03-26 LAB
BUN SERPL-MCNC: 4 MG/DL — LOW (ref 7–23)
CALCIUM SERPL-MCNC: 8.9 MG/DL — SIGNIFICANT CHANGE UP (ref 8.4–10.5)
CHLORIDE SERPL-SCNC: 104 MMOL/L — SIGNIFICANT CHANGE UP (ref 98–107)
CO2 SERPL-SCNC: 26 MMOL/L — SIGNIFICANT CHANGE UP (ref 22–31)
CREAT SERPL-MCNC: 0.63 MG/DL — SIGNIFICANT CHANGE UP (ref 0.5–1.3)
GLUCOSE SERPL-MCNC: 103 MG/DL — HIGH (ref 70–99)
HCT VFR BLD CALC: 24.8 % — LOW (ref 39–50)
HGB BLD-MCNC: 8.8 G/DL — LOW (ref 13–17)
MAGNESIUM SERPL-MCNC: 1.5 MG/DL — LOW (ref 1.6–2.6)
MCHC RBC-ENTMCNC: 30.3 PG — SIGNIFICANT CHANGE UP (ref 27–34)
MCHC RBC-ENTMCNC: 35.5 % — SIGNIFICANT CHANGE UP (ref 32–36)
MCV RBC AUTO: 85.5 FL — SIGNIFICANT CHANGE UP (ref 80–100)
PHOSPHATE SERPL-MCNC: 3.1 MG/DL — SIGNIFICANT CHANGE UP (ref 2.5–4.5)
PLATELET # BLD AUTO: 285 K/UL — SIGNIFICANT CHANGE UP (ref 150–400)
PMV BLD: 9.6 FL — SIGNIFICANT CHANGE UP (ref 7–13)
POTASSIUM SERPL-MCNC: 4.6 MMOL/L — SIGNIFICANT CHANGE UP (ref 3.5–5.3)
POTASSIUM SERPL-SCNC: 4.6 MMOL/L — SIGNIFICANT CHANGE UP (ref 3.5–5.3)
RBC # BLD: 2.9 M/UL — LOW (ref 4.2–5.8)
RBC # FLD: 13.8 % — SIGNIFICANT CHANGE UP (ref 10.3–14.5)
SODIUM SERPL-SCNC: 140 MMOL/L — SIGNIFICANT CHANGE UP (ref 135–145)
WBC # BLD: 5.87 K/UL — SIGNIFICANT CHANGE UP (ref 3.8–10.5)
WBC # FLD AUTO: 5.87 K/UL — SIGNIFICANT CHANGE UP (ref 3.8–10.5)

## 2017-03-26 RX ORDER — DEXTROSE 50 % IN WATER 50 %
1 SYRINGE (ML) INTRAVENOUS ONCE
Qty: 0 | Refills: 0 | Status: DISCONTINUED | OUTPATIENT
Start: 2017-03-26 | End: 2017-03-27

## 2017-03-26 RX ORDER — DEXTROSE 50 % IN WATER 50 %
25 SYRINGE (ML) INTRAVENOUS ONCE
Qty: 0 | Refills: 0 | Status: DISCONTINUED | OUTPATIENT
Start: 2017-03-26 | End: 2017-03-27

## 2017-03-26 RX ORDER — MAGNESIUM SULFATE 500 MG/ML
2 VIAL (ML) INJECTION ONCE
Qty: 0 | Refills: 0 | Status: COMPLETED | OUTPATIENT
Start: 2017-03-26 | End: 2017-03-26

## 2017-03-26 RX ORDER — INSULIN LISPRO 100/ML
VIAL (ML) SUBCUTANEOUS
Qty: 0 | Refills: 0 | Status: DISCONTINUED | OUTPATIENT
Start: 2017-03-26 | End: 2017-03-26

## 2017-03-26 RX ORDER — GLUCAGON INJECTION, SOLUTION 0.5 MG/.1ML
1 INJECTION, SOLUTION SUBCUTANEOUS ONCE
Qty: 0 | Refills: 0 | Status: DISCONTINUED | OUTPATIENT
Start: 2017-03-26 | End: 2017-03-27

## 2017-03-26 RX ORDER — SODIUM CHLORIDE 9 MG/ML
1000 INJECTION, SOLUTION INTRAVENOUS
Qty: 0 | Refills: 0 | Status: DISCONTINUED | OUTPATIENT
Start: 2017-03-26 | End: 2017-03-27

## 2017-03-26 RX ORDER — INSULIN LISPRO 100/ML
VIAL (ML) SUBCUTANEOUS
Qty: 0 | Refills: 0 | Status: DISCONTINUED | OUTPATIENT
Start: 2017-03-26 | End: 2017-03-28

## 2017-03-26 RX ORDER — DEXTROSE 50 % IN WATER 50 %
12.5 SYRINGE (ML) INTRAVENOUS ONCE
Qty: 0 | Refills: 0 | Status: DISCONTINUED | OUTPATIENT
Start: 2017-03-26 | End: 2017-03-27

## 2017-03-26 RX ORDER — INSULIN LISPRO 100/ML
VIAL (ML) SUBCUTANEOUS AT BEDTIME
Qty: 0 | Refills: 0 | Status: DISCONTINUED | OUTPATIENT
Start: 2017-03-26 | End: 2017-03-28

## 2017-03-26 RX ADMIN — HYDROMORPHONE HYDROCHLORIDE 30 MILLILITER(S): 2 INJECTION INTRAMUSCULAR; INTRAVENOUS; SUBCUTANEOUS at 19:19

## 2017-03-26 RX ADMIN — HYDROMORPHONE HYDROCHLORIDE 30 MILLILITER(S): 2 INJECTION INTRAMUSCULAR; INTRAVENOUS; SUBCUTANEOUS at 07:09

## 2017-03-26 RX ADMIN — Medication 50 GRAM(S): at 09:44

## 2017-03-26 RX ADMIN — PANTOPRAZOLE SODIUM 40 MILLIGRAM(S): 20 TABLET, DELAYED RELEASE ORAL at 17:53

## 2017-03-26 RX ADMIN — PANTOPRAZOLE SODIUM 40 MILLIGRAM(S): 20 TABLET, DELAYED RELEASE ORAL at 05:50

## 2017-03-26 RX ADMIN — DEXTROSE MONOHYDRATE, SODIUM CHLORIDE, AND POTASSIUM CHLORIDE 125 MILLILITER(S): 50; .745; 4.5 INJECTION, SOLUTION INTRAVENOUS at 12:04

## 2017-03-26 RX ADMIN — ENOXAPARIN SODIUM 40 MILLIGRAM(S): 100 INJECTION SUBCUTANEOUS at 12:03

## 2017-03-26 RX ADMIN — INSULIN GLARGINE 38 UNIT(S): 100 INJECTION, SOLUTION SUBCUTANEOUS at 22:13

## 2017-03-27 LAB
BUN SERPL-MCNC: 4 MG/DL — LOW (ref 7–23)
CALCIUM SERPL-MCNC: 8.7 MG/DL — SIGNIFICANT CHANGE UP (ref 8.4–10.5)
CHLORIDE SERPL-SCNC: 103 MMOL/L — SIGNIFICANT CHANGE UP (ref 98–107)
CO2 SERPL-SCNC: 24 MMOL/L — SIGNIFICANT CHANGE UP (ref 22–31)
CREAT SERPL-MCNC: 0.69 MG/DL — SIGNIFICANT CHANGE UP (ref 0.5–1.3)
GLUCOSE SERPL-MCNC: 93 MG/DL — SIGNIFICANT CHANGE UP (ref 70–99)
HCT VFR BLD CALC: 23 % — LOW (ref 39–50)
HGB BLD-MCNC: 8 G/DL — LOW (ref 13–17)
MAGNESIUM SERPL-MCNC: 1.6 MG/DL — SIGNIFICANT CHANGE UP (ref 1.6–2.6)
MCHC RBC-ENTMCNC: 29.5 PG — SIGNIFICANT CHANGE UP (ref 27–34)
MCHC RBC-ENTMCNC: 34.8 % — SIGNIFICANT CHANGE UP (ref 32–36)
MCV RBC AUTO: 84.9 FL — SIGNIFICANT CHANGE UP (ref 80–100)
PHOSPHATE SERPL-MCNC: 3.7 MG/DL — SIGNIFICANT CHANGE UP (ref 2.5–4.5)
PLATELET # BLD AUTO: 298 K/UL — SIGNIFICANT CHANGE UP (ref 150–400)
PMV BLD: 9.7 FL — SIGNIFICANT CHANGE UP (ref 7–13)
POTASSIUM SERPL-MCNC: 4.9 MMOL/L — SIGNIFICANT CHANGE UP (ref 3.5–5.3)
POTASSIUM SERPL-SCNC: 4.9 MMOL/L — SIGNIFICANT CHANGE UP (ref 3.5–5.3)
RBC # BLD: 2.71 M/UL — LOW (ref 4.2–5.8)
RBC # FLD: 14 % — SIGNIFICANT CHANGE UP (ref 10.3–14.5)
SODIUM SERPL-SCNC: 138 MMOL/L — SIGNIFICANT CHANGE UP (ref 135–145)
SURGICAL PATHOLOGY STUDY: SIGNIFICANT CHANGE UP
WBC # BLD: 5.18 K/UL — SIGNIFICANT CHANGE UP (ref 3.8–10.5)
WBC # FLD AUTO: 5.18 K/UL — SIGNIFICANT CHANGE UP (ref 3.8–10.5)

## 2017-03-27 RX ORDER — PANTOPRAZOLE SODIUM 20 MG/1
40 TABLET, DELAYED RELEASE ORAL
Qty: 0 | Refills: 0 | Status: DISCONTINUED | OUTPATIENT
Start: 2017-03-27 | End: 2017-03-28

## 2017-03-27 RX ORDER — METOCLOPRAMIDE HCL 10 MG
10 TABLET ORAL EVERY 8 HOURS
Qty: 0 | Refills: 0 | Status: COMPLETED | OUTPATIENT
Start: 2017-03-27 | End: 2017-03-28

## 2017-03-27 RX ORDER — MAGNESIUM SULFATE 500 MG/ML
2 VIAL (ML) INJECTION ONCE
Qty: 0 | Refills: 0 | Status: COMPLETED | OUTPATIENT
Start: 2017-03-27 | End: 2017-03-27

## 2017-03-27 RX ORDER — OXYCODONE HYDROCHLORIDE 5 MG/1
10 TABLET ORAL EVERY 4 HOURS
Qty: 0 | Refills: 0 | Status: DISCONTINUED | OUTPATIENT
Start: 2017-03-27 | End: 2017-03-28

## 2017-03-27 RX ORDER — AMLODIPINE BESYLATE 2.5 MG/1
5 TABLET ORAL DAILY
Qty: 0 | Refills: 0 | Status: DISCONTINUED | OUTPATIENT
Start: 2017-03-27 | End: 2017-03-28

## 2017-03-27 RX ORDER — OXYCODONE HYDROCHLORIDE 5 MG/1
5 TABLET ORAL EVERY 4 HOURS
Qty: 0 | Refills: 0 | Status: DISCONTINUED | OUTPATIENT
Start: 2017-03-27 | End: 2017-03-28

## 2017-03-27 RX ORDER — ACETAMINOPHEN 500 MG
650 TABLET ORAL
Qty: 0 | Refills: 0 | Status: DISCONTINUED | OUTPATIENT
Start: 2017-03-27 | End: 2017-03-28

## 2017-03-27 RX ORDER — HYDROMORPHONE HYDROCHLORIDE 2 MG/ML
0.5 INJECTION INTRAMUSCULAR; INTRAVENOUS; SUBCUTANEOUS EVERY 4 HOURS
Qty: 0 | Refills: 0 | Status: DISCONTINUED | OUTPATIENT
Start: 2017-03-27 | End: 2017-03-28

## 2017-03-27 RX ADMIN — PANTOPRAZOLE SODIUM 40 MILLIGRAM(S): 20 TABLET, DELAYED RELEASE ORAL at 05:46

## 2017-03-27 RX ADMIN — Medication 10 MILLIGRAM(S): at 21:53

## 2017-03-27 RX ADMIN — Medication 650 MILLIGRAM(S): at 08:57

## 2017-03-27 RX ADMIN — ENOXAPARIN SODIUM 40 MILLIGRAM(S): 100 INJECTION SUBCUTANEOUS at 14:18

## 2017-03-27 RX ADMIN — Medication 650 MILLIGRAM(S): at 09:47

## 2017-03-27 RX ADMIN — Medication 650 MILLIGRAM(S): at 15:20

## 2017-03-27 RX ADMIN — Medication 650 MILLIGRAM(S): at 14:22

## 2017-03-27 RX ADMIN — DEXTROSE MONOHYDRATE, SODIUM CHLORIDE, AND POTASSIUM CHLORIDE 75 MILLILITER(S): 50; .745; 4.5 INJECTION, SOLUTION INTRAVENOUS at 08:54

## 2017-03-27 RX ADMIN — Medication 10 MILLIGRAM(S): at 14:20

## 2017-03-27 RX ADMIN — Medication 50 GRAM(S): at 08:54

## 2017-03-27 RX ADMIN — PANTOPRAZOLE SODIUM 40 MILLIGRAM(S): 20 TABLET, DELAYED RELEASE ORAL at 17:37

## 2017-03-27 RX ADMIN — AMLODIPINE BESYLATE 5 MILLIGRAM(S): 2.5 TABLET ORAL at 08:57

## 2017-03-27 RX ADMIN — INSULIN GLARGINE 38 UNIT(S): 100 INJECTION, SOLUTION SUBCUTANEOUS at 21:53

## 2017-03-28 ENCOUNTER — TRANSCRIPTION ENCOUNTER (OUTPATIENT)
Age: 57
End: 2017-03-28

## 2017-03-28 VITALS
TEMPERATURE: 98 F | RESPIRATION RATE: 18 BRPM | OXYGEN SATURATION: 100 % | HEART RATE: 82 BPM | DIASTOLIC BLOOD PRESSURE: 60 MMHG | SYSTOLIC BLOOD PRESSURE: 127 MMHG

## 2017-03-28 LAB
BUN SERPL-MCNC: 6 MG/DL — LOW (ref 7–23)
CALCIUM SERPL-MCNC: 8.5 MG/DL — SIGNIFICANT CHANGE UP (ref 8.4–10.5)
CHLORIDE SERPL-SCNC: 104 MMOL/L — SIGNIFICANT CHANGE UP (ref 98–107)
CO2 SERPL-SCNC: 22 MMOL/L — SIGNIFICANT CHANGE UP (ref 22–31)
CREAT SERPL-MCNC: 0.74 MG/DL — SIGNIFICANT CHANGE UP (ref 0.5–1.3)
GLUCOSE SERPL-MCNC: 57 MG/DL — LOW (ref 70–99)
HCT VFR BLD CALC: 22.1 % — LOW (ref 39–50)
HGB BLD-MCNC: 7.6 G/DL — LOW (ref 13–17)
MAGNESIUM SERPL-MCNC: 1.7 MG/DL — SIGNIFICANT CHANGE UP (ref 1.6–2.6)
MCHC RBC-ENTMCNC: 29.7 PG — SIGNIFICANT CHANGE UP (ref 27–34)
MCHC RBC-ENTMCNC: 34.4 % — SIGNIFICANT CHANGE UP (ref 32–36)
MCV RBC AUTO: 86.3 FL — SIGNIFICANT CHANGE UP (ref 80–100)
PHOSPHATE SERPL-MCNC: 3.8 MG/DL — SIGNIFICANT CHANGE UP (ref 2.5–4.5)
PLATELET # BLD AUTO: 338 K/UL — SIGNIFICANT CHANGE UP (ref 150–400)
PMV BLD: 9.7 FL — SIGNIFICANT CHANGE UP (ref 7–13)
POTASSIUM SERPL-MCNC: 4 MMOL/L — SIGNIFICANT CHANGE UP (ref 3.5–5.3)
POTASSIUM SERPL-SCNC: 4 MMOL/L — SIGNIFICANT CHANGE UP (ref 3.5–5.3)
RBC # BLD: 2.56 M/UL — LOW (ref 4.2–5.8)
RBC # FLD: 14.2 % — SIGNIFICANT CHANGE UP (ref 10.3–14.5)
SODIUM SERPL-SCNC: 140 MMOL/L — SIGNIFICANT CHANGE UP (ref 135–145)
WBC # BLD: 6.15 K/UL — SIGNIFICANT CHANGE UP (ref 3.8–10.5)
WBC # FLD AUTO: 6.15 K/UL — SIGNIFICANT CHANGE UP (ref 3.8–10.5)

## 2017-03-28 RX ORDER — ACETAMINOPHEN 500 MG
2 TABLET ORAL
Qty: 0 | Refills: 0 | COMMUNITY
Start: 2017-03-28

## 2017-03-28 RX ORDER — PANTOPRAZOLE SODIUM 20 MG/1
1 TABLET, DELAYED RELEASE ORAL
Qty: 20 | Refills: 0 | OUTPATIENT
Start: 2017-03-28 | End: 2017-04-07

## 2017-03-28 RX ORDER — MAGNESIUM SULFATE 500 MG/ML
2 VIAL (ML) INJECTION ONCE
Qty: 0 | Refills: 0 | Status: COMPLETED | OUTPATIENT
Start: 2017-03-28 | End: 2017-03-28

## 2017-03-28 RX ORDER — OXYCODONE HYDROCHLORIDE 5 MG/1
1 TABLET ORAL
Qty: 18 | Refills: 0 | OUTPATIENT
Start: 2017-03-28 | End: 2017-03-31

## 2017-03-28 RX ADMIN — PANTOPRAZOLE SODIUM 40 MILLIGRAM(S): 20 TABLET, DELAYED RELEASE ORAL at 06:08

## 2017-03-28 RX ADMIN — Medication 10 MILLIGRAM(S): at 06:08

## 2017-03-28 RX ADMIN — AMLODIPINE BESYLATE 5 MILLIGRAM(S): 2.5 TABLET ORAL at 06:25

## 2017-03-28 RX ADMIN — PANTOPRAZOLE SODIUM 40 MILLIGRAM(S): 20 TABLET, DELAYED RELEASE ORAL at 17:06

## 2017-03-28 RX ADMIN — Medication 650 MILLIGRAM(S): at 14:54

## 2017-03-28 RX ADMIN — Medication 650 MILLIGRAM(S): at 14:53

## 2017-03-28 RX ADMIN — Medication 650 MILLIGRAM(S): at 09:19

## 2017-03-28 RX ADMIN — Medication 650 MILLIGRAM(S): at 09:20

## 2017-03-28 RX ADMIN — Medication 50 GRAM(S): at 09:19

## 2017-03-28 RX ADMIN — ENOXAPARIN SODIUM 40 MILLIGRAM(S): 100 INJECTION SUBCUTANEOUS at 12:41

## 2017-03-28 NOTE — DISCHARGE NOTE ADULT - MEDICATION SUMMARY - MEDICATIONS TO TAKE
I will START or STAY ON the medications listed below when I get home from the hospital:    acetaminophen 325 mg oral tablet  -- 2 tab(s) by mouth   -- Indication: For Pain control    oxyCODONE 5 mg oral tablet  -- 1-2 tab(s) by mouth every 4 hours, As needed, moderate to severe pain MDD:6  -- Indication: For Pain control    enoxaparin 40 mg/0.4 mL injectable solution  -- 40 milligram(s) injectable once a day x 28 days  -- It is very important that you take or use this exactly as directed.  Do not skip doses or discontinue unless directed by your doctor.    -- Indication: For Prevent blood clots    insulin glargine 100 units/mL subcutaneous solution  -- 38 unit(s) subcutaneous once a day (at bedtime)  -- Indication: For Diabetes    amlodipine 5 mg oral tablet  -- 1 tab(s) by mouth once a day in pm  -- Indication: For High blood pressure    pantoprazole 40 mg oral delayed release tablet  -- 1 tab(s) by mouth 2 times a day (before meals)  -- Indication: For Acid reflux

## 2017-03-28 NOTE — DISCHARGE NOTE ADULT - HOSPITAL COURSE
56yr old  male with h/o Type II diabetes and preop dx of Malignant neoplasm of pancreas presents to have [facility] eval for Diagnostic laparoscopy, Whipple scheduled on 3/20/2017. Patient is a poor historian.  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 ERCP was attempted and aborted due to blockage of ampulla by the mass. He presented to [facility] on 3/20 for scheduled gastrojejunostomy with Dr. Sosa. 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 Postop he went to the surgical floor. His NGT ouput was bloody and he was started on protonix. He was tachycardic postoperatively. Pain control was achieved with a PCA. Pathology metastatic adenocarinoma.     On POD1 his NGT was discontinued. He received 5mg of vitamin K on POD1 for bloody NGT output and elevated INR. His hypomagnesemia was supplemented.     On POD2 the patient was started on a clear liquid diet. He had decreased urine output and received boluses of IV fluids. His hyperglycemia was controlled with lantus and correctional insulin. He had a small episode of emesis on POD3 after eating. Abdominal xray was completed and revealed a post operative ileus. He was made NPO and continued on IV fluid resuscitation. His H/H was trended over the course of these days.    On POD5 he was advanced to sips and ice chips which he tolerated well. He was advanced to a clear liquid diet on POD6 which he tolerated well without nausea/vomiting. His pain medications were transitioned from IV to PO which he tolerated well, and pain was adequately controlled. On POD7 he was advanced to a regular diet.     At the time of discharge, the patient was hemodynamically stable, was tolerating PO diet, was voiding urine and passing stool, was ambulating, and was comfortable with adequate pain control. The patient was instructed to follow up with Dr. Sosa in one week in the [place] office after discharge from the hospital. The patient & family felt comfortable with discharge. The patient was discharged to home after being deemed stable for discharge per the attending. The patient had no other issues.     Discussion of discharge, follow up and pain control as well as drain care was performed with [facility] [id number] on 3/28/17 at 930AM.

## 2017-03-28 NOTE — DISCHARGE NOTE ADULT - PATIENT PORTAL LINK FT
“You can access the Follow[facility] Patient Portal, offered by [facility], by registering with the following website: [email or web address]”

## 2017-03-28 NOTE — DISCHARGE NOTE ADULT - INSTRUCTIONS
Low residue (fiber) diet, diabetic diet Report to your doctor any fever 100 and above, redness/swelling/foul odor drainage to abdominal incisions. Empty your FREDI drain and record the output for your doctor. Call 911 for chest pain, and/or difficulty breathing. Take medications as prescribed. Keep doctor appt as directed.

## 2017-03-28 NOTE — DISCHARGE NOTE ADULT - CONDITIONS AT DISCHARGE
Pt remained hemodynamically stable.  Pt denies any pain or shortness of breath. Pt being discharged to home. Pt understands all discharge instructions and medication.

## 2017-03-28 NOTE — DISCHARGE NOTE ADULT - PROVIDER TOKENS
CURT:'1102:MIIS:1102' TOKEN:'1102:MIIS:1102',FREE:[LAST:[Jose],FIRST:[Jed],PHONE:[phone]],FAX:[(   )    -]]

## 2017-03-28 NOTE — DISCHARGE NOTE ADULT - CARE PROVIDER_API CALL
Avelino Sosa), ColonRectal Surgery; Surgery  [address] [place] [address]  Phone: [phone]  Fax: [phone] Avelino Sosa (MD), ColonRectal Surgery; Surgery  [address] [place] [address]  Phone: [phone]  Fax: [phone]    Jed Garcia  Phone: [phone]  Fax: (       -

## 2017-03-28 NOTE — DISCHARGE NOTE ADULT - CARE PLAN
Principal Discharge DX:	Malignant neoplasm of pancreas  Goal:	Postoperative pain control, tolerate diet, return to normal activity  Instructions for follow-up, activity and diet:	WOUND CARE:   BATHING: Please do not submerge wound underwater. You may shower and/or sponge bathe.  ACTIVITY: No heavy lifting or straining. Otherwise, you may return to your usual level of physical activity. If you are taking narcotic pain medication (such as Percocet), do NOT drive a car, operate machinery or make important decisions.  DIET: Return to your usual diet.  NOTIFY YOUR SURGEON IF: You have any bleeding that does not stop, any pus draining from your wound, any fever (over 100.4 F) or chills, persistent nausea/vomiting, persistent diarrhea, or if your pain is not controlled on your discharge pain medications.  FOLLOW-UP:  1. Please call to make a follow-up appointment within one week of discharge  2. Please follow up with your primary care physician in one week regarding your hospitalization.  Secondary Diagnosis:	Diabetes  Goal:	Blood sugar control  Instructions for follow-up, activity and diet:	-Continue lantus  -Continue coverage insulin  -Check finger sticks before meals and at bedtime  -Follow up with your primary care provider in one week  -Maintain a log of all fingersticks  -Adhere to a diabetic consistent carbohydrate diet  Secondary Diagnosis:	Hypertension  Goal:	Control blood pressure  Instructions for follow-up, activity and diet:	-Continue blood pressure medications  -Follow up with primary care doctor in one week

## 2017-03-28 NOTE — DISCHARGE NOTE ADULT - ADDITIONAL INSTRUCTIONS
WOUND CARE:   BATHING: Please do not submerge wound underwater. You may shower and/or sponge bathe.  ACTIVITY: No heavy lifting or straining. Otherwise, you may return to your usual level of physical activity. If you are taking narcotic pain medication (such as Percocet), do NOT drive a car, operate machinery or make important decisions.  DIET: Return to your usual diet.  NOTIFY YOUR SURGEON IF: You have any bleeding that does not stop, any pus draining from your wound, any fever (over 100.4 F) or chills, persistent nausea/vomiting, persistent diarrhea, or if your pain is not controlled on your discharge pain medications.  FOLLOW-UP:  1. Please call to make a follow-up appointment within one week of discharge with Dr. Sosa   2. Please follow up with your primary care physician in one week regarding your hospitalization.

## 2017-03-28 NOTE — DISCHARGE NOTE ADULT - PLAN OF CARE
Blood sugar control -Continue lantus  -Continue coverage insulin  -Check finger sticks before meals and at bedtime  -Follow up with your primary care provider in one week  -Maintain a log of all fingersticks  -Adhere to a diabetic consistent carbohydrate diet Postoperative pain control, tolerate diet, return to normal activity WOUND CARE:   BATHING: Please do not submerge wound underwater. You may shower and/or sponge bathe.  ACTIVITY: No heavy lifting or straining. Otherwise, you may return to your usual level of physical activity. If you are taking narcotic pain medication (such as Percocet), do NOT drive a car, operate machinery or make important decisions.  DIET: Return to your usual diet.  NOTIFY YOUR SURGEON IF: You have any bleeding that does not stop, any pus draining from your wound, any fever (over 100.4 F) or chills, persistent nausea/vomiting, persistent diarrhea, or if your pain is not controlled on your discharge pain medications.  FOLLOW-UP:  1. Please call to make a follow-up appointment within one week of discharge  2. Please follow up with your primary care physician in one week regarding your hospitalization. Control blood pressure -Continue blood pressure medications  -Follow up with primary care doctor in one week

## 2017-04-12 PROBLEM — Z97.3 WEARS GLASSES: Status: RESOLVED | Noted: 2017-04-12 | Resolved: 2017-04-12

## 2017-04-12 PROBLEM — Z98.890 POST-OPERATIVE STATE: Status: ACTIVE | Noted: 2017-04-12

## 2017-04-12 RX ORDER — INSULIN GLARGINE 100 [IU]/ML
100 INJECTION, SOLUTION SUBCUTANEOUS
Refills: 0 | Status: ACTIVE | COMMUNITY

## 2017-04-12 RX ORDER — AMLODIPINE BESYLATE 5 MG/1
5 TABLET ORAL
Refills: 0 | Status: ACTIVE | COMMUNITY

## 2017-04-12 RX ORDER — LISINOPRIL 10 MG/1
10 TABLET ORAL
Refills: 0 | Status: ACTIVE | COMMUNITY

## 2017-04-12 RX ORDER — HUMAN INSULIN 100 [IU]/ML
INJECTION, SUSPENSION SUBCUTANEOUS
Refills: 0 | Status: ACTIVE | COMMUNITY

## 2017-04-13 ENCOUNTER — APPOINTMENT (OUTPATIENT)
Dept: SURGICAL ONCOLOGY | Facility: CLINIC | Age: 57
End: 2017-04-13

## 2017-04-13 VITALS
OXYGEN SATURATION: 97 % | HEIGHT: 66 IN | TEMPERATURE: 98 F | WEIGHT: 170 LBS | HEART RATE: 72 BPM | DIASTOLIC BLOOD PRESSURE: 80 MMHG | RESPIRATION RATE: 13 BRPM | SYSTOLIC BLOOD PRESSURE: 120 MMHG | BODY MASS INDEX: 27.32 KG/M2

## 2017-04-13 DIAGNOSIS — Z98.890 OTHER SPECIFIED POSTPROCEDURAL STATES: ICD-10-CM

## 2017-04-13 DIAGNOSIS — Z91.89 OTHER SPECIFIED PERSONAL RISK FACTORS, NOT ELSEWHERE CLASSIFIED: ICD-10-CM

## 2017-04-13 DIAGNOSIS — Z97.3 PRESENCE OF SPECTACLES AND CONTACT LENSES: ICD-10-CM

## 2017-04-13 DIAGNOSIS — Z48.02 ENCOUNTER FOR REMOVAL OF SUTURES: ICD-10-CM

## 2017-04-13 RX ORDER — OXYCODONE AND ACETAMINOPHEN 10; 325 MG/1; MG/1
10-325 TABLET ORAL
Qty: 40 | Refills: 0 | Status: ACTIVE | OUTPATIENT
Start: 2017-04-13

## 2017-04-13 RX ORDER — FLUTICASONE PROPIONATE 50 MCG
50 SPRAY, SUSPENSION NASAL
Refills: 0 | Status: DISCONTINUED | COMMUNITY
End: 2017-04-13

## 2017-04-13 RX ORDER — GABAPENTIN 600 MG/1
600 TABLET, COATED ORAL
Refills: 0 | Status: DISCONTINUED | COMMUNITY
End: 2017-04-13

## 2017-04-17 ENCOUNTER — APPOINTMENT (OUTPATIENT)
Dept: SURGICAL ONCOLOGY | Facility: CLINIC | Age: 57
End: 2017-04-17

## 2017-04-17 VITALS
DIASTOLIC BLOOD PRESSURE: 76 MMHG | HEART RATE: 76 BPM | TEMPERATURE: 97.9 F | BODY MASS INDEX: 22.82 KG/M2 | RESPIRATION RATE: 16 BRPM | SYSTOLIC BLOOD PRESSURE: 111 MMHG | HEIGHT: 66 IN | WEIGHT: 142 LBS

## 2017-04-18 ENCOUNTER — OUTPATIENT (OUTPATIENT)
Dept: OUTPATIENT SERVICES | Facility: HOSPITAL | Age: 57
LOS: 1 days | End: 2017-04-18
Payer: MEDICAID

## 2017-04-18 VITALS
HEIGHT: 65 IN | WEIGHT: 141.98 LBS | SYSTOLIC BLOOD PRESSURE: 110 MMHG | HEART RATE: 84 BPM | RESPIRATION RATE: 18 BRPM | TEMPERATURE: 97 F | DIASTOLIC BLOOD PRESSURE: 70 MMHG

## 2017-04-18 DIAGNOSIS — Z98.890 OTHER SPECIFIED POSTPROCEDURAL STATES: Chronic | ICD-10-CM

## 2017-04-18 DIAGNOSIS — C25.9 MALIGNANT NEOPLASM OF PANCREAS, UNSPECIFIED: ICD-10-CM

## 2017-04-18 DIAGNOSIS — Z96.89 PRESENCE OF OTHER SPECIFIED FUNCTIONAL IMPLANTS: Chronic | ICD-10-CM

## 2017-04-18 DIAGNOSIS — E11.9 TYPE 2 DIABETES MELLITUS WITHOUT COMPLICATIONS: ICD-10-CM

## 2017-04-18 DIAGNOSIS — C25.9 MALIGNANT NEOPLASM OF PANCREAS, UNSPECIFIED: Chronic | ICD-10-CM

## 2017-04-18 LAB
ALBUMIN SERPL ELPH-MCNC: 4.2 G/DL — SIGNIFICANT CHANGE UP (ref 3.3–5)
ALP SERPL-CCNC: 303 U/L — HIGH (ref 40–120)
ALT FLD-CCNC: 38 U/L — SIGNIFICANT CHANGE UP (ref 4–41)
AST SERPL-CCNC: 28 U/L — SIGNIFICANT CHANGE UP (ref 4–40)
BILIRUB SERPL-MCNC: 1.1 MG/DL — SIGNIFICANT CHANGE UP (ref 0.2–1.2)
BUN SERPL-MCNC: 15 MG/DL — SIGNIFICANT CHANGE UP (ref 7–23)
CALCIUM SERPL-MCNC: 10 MG/DL — SIGNIFICANT CHANGE UP (ref 8.4–10.5)
CHLORIDE SERPL-SCNC: 94 MMOL/L — LOW (ref 98–107)
CO2 SERPL-SCNC: 27 MMOL/L — SIGNIFICANT CHANGE UP (ref 22–31)
CREAT SERPL-MCNC: 0.86 MG/DL — SIGNIFICANT CHANGE UP (ref 0.5–1.3)
GLUCOSE SERPL-MCNC: 267 MG/DL — HIGH (ref 70–99)
HBA1C BLD-MCNC: 7.9 % — HIGH (ref 4–5.6)
HCT VFR BLD CALC: 39.4 % — SIGNIFICANT CHANGE UP (ref 39–50)
HGB BLD-MCNC: 13.5 G/DL — SIGNIFICANT CHANGE UP (ref 13–17)
MCHC RBC-ENTMCNC: 29 PG — SIGNIFICANT CHANGE UP (ref 27–34)
MCHC RBC-ENTMCNC: 34.3 % — SIGNIFICANT CHANGE UP (ref 32–36)
MCV RBC AUTO: 84.7 FL — SIGNIFICANT CHANGE UP (ref 80–100)
PLATELET # BLD AUTO: 324 K/UL — SIGNIFICANT CHANGE UP (ref 150–400)
PMV BLD: 11.4 FL — SIGNIFICANT CHANGE UP (ref 7–13)
POTASSIUM SERPL-MCNC: 4.2 MMOL/L — SIGNIFICANT CHANGE UP (ref 3.5–5.3)
POTASSIUM SERPL-SCNC: 4.2 MMOL/L — SIGNIFICANT CHANGE UP (ref 3.5–5.3)
PROT SERPL-MCNC: 8.1 G/DL — SIGNIFICANT CHANGE UP (ref 6–8.3)
RBC # BLD: 4.65 M/UL — SIGNIFICANT CHANGE UP (ref 4.2–5.8)
RBC # FLD: 12.9 % — SIGNIFICANT CHANGE UP (ref 10.3–14.5)
SODIUM SERPL-SCNC: 136 MMOL/L — SIGNIFICANT CHANGE UP (ref 135–145)
WBC # BLD: 5.78 K/UL — SIGNIFICANT CHANGE UP (ref 3.8–10.5)
WBC # FLD AUTO: 5.78 K/UL — SIGNIFICANT CHANGE UP (ref 3.8–10.5)

## 2017-04-18 PROCEDURE — 93010 ELECTROCARDIOGRAM REPORT: CPT

## 2017-04-18 NOTE — H&P PST ADULT - PROBLEM SELECTOR PLAN 1
Scheduled for infusaport insertion on 04/24/17. Preop instructions, famotidine given and explained. Pt verbalized understanding.

## 2017-04-18 NOTE — H&P PST ADULT - PRIMARY CARE PROVIDER
Dr Jed Garcia [facility] [phone] Dr Jorden Garcia [facility] [phone]

## 2017-04-18 NOTE — H&P PST ADULT - NEGATIVE GENERAL GENITOURINARY SYMPTOMS
no hematuria/normal urinary frequency no gas in urine/normal libido/no renal colic/normal urinary frequency/no bladder infections/no flank pain L/no hematuria/no urine discoloration/no dysuria/no flank pain R

## 2017-04-18 NOTE — H&P PST ADULT - NEGATIVE NEUROLOGICAL SYMPTOMS
no transient paralysis/no weakness/no difficulty walking no tremors/no paresthesias/no syncope/no focal seizures/no vertigo/no transient paralysis/no weakness/no difficulty walking

## 2017-04-18 NOTE — H&P PST ADULT - NEGATIVE ALLERGY TYPES
no outdoor environmental allergies/no reactions to medicines/no reactions to insect bites/no reactions to food/no indoor environmental allergies/no reactions to animals

## 2017-04-18 NOTE — H&P PST ADULT - NEGATIVE OPHTHALMOLOGIC SYMPTOMS
no lacrimation L/no pain L/no diplopia/no photophobia/no pain R/no lacrimation R no lacrimation L/no lacrimation R/no irritation L/no blurred vision L/no discharge R/no loss of vision R/no photophobia/no pain R/no loss of vision L/no discharge L/no irritation R/no diplopia/no blurred vision R/no pain L/no scleral injection L
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
## 2017-04-18 NOTE — H&P PST ADULT - NEGATIVE CARDIOVASCULAR SYMPTOMS
no palpitations/no dyspnea on exertion/no chest pain no orthopnea/no paroxysmal nocturnal dyspnea/no dyspnea on exertion/no claudication/no peripheral edema/no chest pain/no palpitations

## 2017-04-18 NOTE — H&P PST ADULT - ATTENDING COMMENTS
R/B/A discussed with patient via  phone, he expressed understanding and agrees to proceed with placement of mediport.

## 2017-04-18 NOTE — H&P PST ADULT - PSH
H/O arthroscopy of shoulder  2014  History of penile implant  Jan 2016) H/O arthroscopy of shoulder  2014  History of penile implant  Jan 2016)  Malignant neoplasm of pancreas, unspecified  Whipple procedure; 03/2017

## 2017-04-18 NOTE — H&P PST ADULT - HISTORY OF PRESENT ILLNESS
57 yr old  male with h/o Type II diabetes, HTN and preop dx of Malignant neoplasm of pancreas presents to have [facility] eval for Diagnostic laparoscopy, Whipple scheduled on 3/20/2017. Patient is a poor historian.  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 ERCP was attempted and aborted due to blockage of ampulla by the mass. 57 yr old  male with h/o Type II diabetes, HTN and preop dx of Malignant neoplasm of pancreas presents to have PST eval for Diagnostic laparoscopy, Whipple scheduled on 3/20/2017.   Patient is a poor historian.  Patient was seen at [facility] due to recent onset of pruritus an jaundice and during evaluation was diagnosed with duodenal adenocarcinoma and was referred to Dr Sosa for further evaluation. ERCP was attempted and aborted due to blockage of ampulla by the mass. 57 yr old  male with h/o Type II diabetes, HTN and preop dx of Malignant neoplasm of pancreas -S/P diagnostic laparoscopy, Whipple scheduled on 3/20/2017. Pt presents to PST today for pre op evaluation in preparation for infusaport insertion on 04/24/17.

## 2017-04-18 NOTE — H&P PST ADULT - NEGATIVE RESPIRATORY AND THORAX SYMPTOMS
no cough/no dyspnea/no wheezing no hemoptysis/no wheezing/no cough/no pleuritic chest pain/no dyspnea

## 2017-04-18 NOTE — H&P PST ADULT - NS MD HP INPLANTS MED DEV
fixed bridge, penile implant fixed bridge, penile implant, right drainage catheter fixed bridge, penile implant, right bile duct catheter

## 2017-04-18 NOTE — H&P PST ADULT - PROBLEM SELECTOR PLAN 2
Monitor BS on day of surgery. Pt instructed to decrease dose of Lantus by 20 % the night before surgery. Also instructed not to take any diabetes medications on day of surgery. Pt verbalized understanding.

## 2017-04-18 NOTE — H&P PST ADULT - RS GEN PE MLT RESP DETAILS PC
good air movement/airway patent/no chest wall tenderness/no intercostal retractions/no rhonchi/no subcutaneous emphysema/breath sounds equal/respirations non-labored/clear to auscultation bilaterally/no rales/no wheezes

## 2017-04-18 NOTE — H&P PST ADULT - NEGATIVE MUSCULOSKELETAL SYMPTOMS
no neck pain/no back pain/no arthralgia/no arthritis no muscle cramps/no arthritis/no myalgia/no back pain/no neck pain/no arthralgia

## 2017-04-18 NOTE — H&P PST ADULT - NEGATIVE GENERAL SYMPTOMS
no polyphagia/no chills/no polydipsia/no fever/no polyuria no polyuria/no fatigue/no fever/no chills/no polyphagia/no polydipsia no sweating/no anorexia/no chills/no fever/no polyphagia/no polyuria/no polydipsia

## 2017-04-18 NOTE — H&P PST ADULT - NEGATIVE GASTROINTESTINAL SYMPTOMS
no diarrhea/no nausea/no constipation/no change in bowel habits no diarrhea/no melena/no abdominal pain/no change in bowel habits

## 2017-04-18 NOTE — H&P PST ADULT - NEGATIVE ENMT SYMPTOMS
no ear pain/no hearing difficulty/no tinnitus/no post-nasal discharge no ear pain/no sinus symptoms/no nasal congestion/no nasal discharge/no post-nasal discharge/no vertigo/no dysphagia/no nasal obstruction/no abnormal taste sensation/no throat pain/no hearing difficulty/no gum bleeding/no dry mouth/no tinnitus/no nose bleeds

## 2017-04-21 ENCOUNTER — OUTPATIENT (OUTPATIENT)
Dept: OUTPATIENT SERVICES | Facility: HOSPITAL | Age: 57
LOS: 1 days | Discharge: HOME | End: 2017-04-21

## 2017-04-21 DIAGNOSIS — Z96.89 PRESENCE OF OTHER SPECIFIED FUNCTIONAL IMPLANTS: Chronic | ICD-10-CM

## 2017-04-21 DIAGNOSIS — Z98.890 OTHER SPECIFIED POSTPROCEDURAL STATES: Chronic | ICD-10-CM

## 2017-04-21 DIAGNOSIS — C17.0 MALIGNANT NEOPLASM OF DUODENUM: ICD-10-CM

## 2017-04-21 DIAGNOSIS — C25.9 MALIGNANT NEOPLASM OF PANCREAS, UNSPECIFIED: Chronic | ICD-10-CM

## 2017-04-24 ENCOUNTER — FORM ENCOUNTER (OUTPATIENT)
Age: 57
End: 2017-04-24

## 2017-04-24 ENCOUNTER — TRANSCRIPTION ENCOUNTER (OUTPATIENT)
Age: 57
End: 2017-04-24

## 2017-04-24 ENCOUNTER — APPOINTMENT (OUTPATIENT)
Dept: SURGICAL ONCOLOGY | Facility: AMBULATORY SURGERY CENTER | Age: 57
End: 2017-04-24

## 2017-04-24 ENCOUNTER — OUTPATIENT (OUTPATIENT)
Dept: OUTPATIENT SERVICES | Facility: HOSPITAL | Age: 57
LOS: 1 days | Discharge: HOME | End: 2017-04-24
Payer: MEDICAID

## 2017-04-24 VITALS
OXYGEN SATURATION: 100 % | SYSTOLIC BLOOD PRESSURE: 119 MMHG | DIASTOLIC BLOOD PRESSURE: 69 MMHG | TEMPERATURE: 97 F | HEART RATE: 86 BPM | WEIGHT: 141.98 LBS | HEIGHT: 65 IN | RESPIRATION RATE: 18 BRPM

## 2017-04-24 VITALS
HEART RATE: 100 BPM | TEMPERATURE: 98 F | RESPIRATION RATE: 16 BRPM | SYSTOLIC BLOOD PRESSURE: 134 MMHG | DIASTOLIC BLOOD PRESSURE: 70 MMHG | OXYGEN SATURATION: 100 %

## 2017-04-24 DIAGNOSIS — Z98.890 OTHER SPECIFIED POSTPROCEDURAL STATES: Chronic | ICD-10-CM

## 2017-04-24 DIAGNOSIS — C25.9 MALIGNANT NEOPLASM OF PANCREAS, UNSPECIFIED: ICD-10-CM

## 2017-04-24 DIAGNOSIS — C25.9 MALIGNANT NEOPLASM OF PANCREAS, UNSPECIFIED: Chronic | ICD-10-CM

## 2017-04-24 DIAGNOSIS — Z96.89 PRESENCE OF OTHER SPECIFIED FUNCTIONAL IMPLANTS: Chronic | ICD-10-CM

## 2017-04-24 PROCEDURE — 36561 INSERT TUNNELED CV CATH: CPT | Mod: 79,RT

## 2017-04-24 PROCEDURE — 77001 FLUOROGUIDE FOR VEIN DEVICE: CPT | Mod: 26,59

## 2017-04-24 NOTE — ASU DISCHARGE PLAN (ADULT/PEDIATRIC). - NOTIFY
Pain not relieved by Medications/Numbness, color, or temperature change to extremity/Bleeding that does not stop/Fever greater than 101/Numbness, tingling

## 2017-04-25 PROCEDURE — 71010: CPT | Mod: 26

## 2017-04-30 ENCOUNTER — RESULT REVIEW (OUTPATIENT)
Age: 57
End: 2017-04-30

## 2017-05-01 ENCOUNTER — APPOINTMENT (OUTPATIENT)
Dept: HEMATOLOGY ONCOLOGY | Facility: CLINIC | Age: 57
End: 2017-05-01

## 2017-05-01 VITALS
DIASTOLIC BLOOD PRESSURE: 90 MMHG | HEIGHT: 65.98 IN | WEIGHT: 141.76 LBS | OXYGEN SATURATION: 99 % | BODY MASS INDEX: 22.78 KG/M2 | RESPIRATION RATE: 16 BRPM | TEMPERATURE: 97.9 F | HEART RATE: 97 BPM | SYSTOLIC BLOOD PRESSURE: 131 MMHG

## 2017-05-01 RX ORDER — PANTOPRAZOLE 40 MG/1
40 TABLET, DELAYED RELEASE ORAL DAILY
Qty: 30 | Refills: 1 | Status: DISCONTINUED | OUTPATIENT
Start: 2017-04-17 | End: 2017-05-01

## 2017-05-02 ENCOUNTER — APPOINTMENT (OUTPATIENT)
Dept: CT IMAGING | Facility: CLINIC | Age: 57
End: 2017-05-02

## 2017-05-02 ENCOUNTER — INPATIENT (INPATIENT)
Facility: HOSPITAL | Age: 57
LOS: 8 days | Discharge: HOME HEALTH | DRG: 374 | End: 2017-05-11
Attending: SURGERY | Admitting: SURGERY
Payer: MEDICAID

## 2017-05-02 VITALS — WEIGHT: 141.98 LBS | HEIGHT: 66 IN

## 2017-05-02 DIAGNOSIS — C25.9 MALIGNANT NEOPLASM OF PANCREAS, UNSPECIFIED: Chronic | ICD-10-CM

## 2017-05-02 DIAGNOSIS — Z98.890 OTHER SPECIFIED POSTPROCEDURAL STATES: Chronic | ICD-10-CM

## 2017-05-02 DIAGNOSIS — C17.0 MALIGNANT NEOPLASM OF DUODENUM: ICD-10-CM

## 2017-05-02 DIAGNOSIS — Z96.89 PRESENCE OF OTHER SPECIFIED FUNCTIONAL IMPLANTS: Chronic | ICD-10-CM

## 2017-05-02 LAB
ALBUMIN SERPL ELPH-MCNC: 3.8 G/DL — SIGNIFICANT CHANGE UP (ref 3.3–5.2)
ALBUMIN SERPL ELPH-MCNC: 3.9 G/DL
ALP BLD-CCNC: 903 U/L
ALP SERPL-CCNC: 877 U/L — HIGH (ref 40–120)
ALT FLD-CCNC: 292 U/L — HIGH
ALT SERPL-CCNC: 313 U/L
ANION GAP SERPL CALC-SCNC: 11 MMOL/L — SIGNIFICANT CHANGE UP (ref 5–17)
ANION GAP SERPL CALC-SCNC: 19 MMOL/L
APTT BLD: 32.3 SEC — SIGNIFICANT CHANGE UP (ref 27.5–37.4)
APTT BLD: 34 SEC
AST SERPL-CCNC: 221 U/L — HIGH
AST SERPL-CCNC: 228 U/L
BASOPHILS # BLD AUTO: 0 K/UL — SIGNIFICANT CHANGE UP (ref 0–0.2)
BASOPHILS NFR BLD AUTO: 0.1 % — SIGNIFICANT CHANGE UP (ref 0–2)
BILIRUB SERPL-MCNC: 12 MG/DL
BILIRUB SERPL-MCNC: 13.8 MG/DL — HIGH (ref 0.4–2)
BLD GP AB SCN SERPL QL: SIGNIFICANT CHANGE UP
BUN SERPL-MCNC: 6 MG/DL
BUN SERPL-MCNC: 7 MG/DL — LOW (ref 8–20)
CALCIUM SERPL-MCNC: 9.7 MG/DL
CALCIUM SERPL-MCNC: 9.8 MG/DL — SIGNIFICANT CHANGE UP (ref 8.6–10.2)
CEA SERPL-MCNC: 43.2 NG/ML
CHLORIDE SERPL-SCNC: 93 MMOL/L
CHLORIDE SERPL-SCNC: 94 MMOL/L — LOW (ref 98–107)
CO2 SERPL-SCNC: 22 MMOL/L
CO2 SERPL-SCNC: 27 MMOL/L — SIGNIFICANT CHANGE UP (ref 22–29)
CREAT SERPL-MCNC: 0.82 MG/DL
CREAT SERPL-MCNC: <0.2 MG/DL — LOW (ref 0.5–1.3)
EOSINOPHIL # BLD AUTO: 0 K/UL — SIGNIFICANT CHANGE UP (ref 0–0.5)
EOSINOPHIL NFR BLD AUTO: 0.7 % — SIGNIFICANT CHANGE UP (ref 0–5)
GLUCOSE SERPL-MCNC: 167 MG/DL — HIGH (ref 70–115)
GLUCOSE SERPL-MCNC: 381 MG/DL
HBV CORE IGM SER QL: NONREACTIVE
HBV SURFACE AB SER QL: NONREACTIVE
HBV SURFACE AB SERPL IA-ACNC: <3 MIU/ML
HBV SURFACE AG SER QL: NONREACTIVE
HCT VFR BLD CALC: 34.1 % — LOW (ref 42–52)
HCV AB SER QL: NONREACTIVE
HCV S/CO RATIO: 0.27 S/CO
HGB BLD-MCNC: 12.1 G/DL — LOW (ref 14–18)
INR BLD: 1.48 RATIO — HIGH (ref 0.88–1.16)
INR PPP: 1.33 RATIO
LACTATE BLDV-MCNC: 1.3 MMOL/L — SIGNIFICANT CHANGE UP (ref 0.7–2)
LIDOCAIN IGE QN: 107 U/L — HIGH (ref 22–51)
LYMPHOCYTES # BLD AUTO: 1.9 K/UL — SIGNIFICANT CHANGE UP (ref 1–4.8)
LYMPHOCYTES # BLD AUTO: 26.8 % — SIGNIFICANT CHANGE UP (ref 20–55)
MAGNESIUM SERPL-MCNC: 1.5 MG/DL
MCHC RBC-ENTMCNC: 28.3 PG — SIGNIFICANT CHANGE UP (ref 27–31)
MCHC RBC-ENTMCNC: 35.5 G/DL — SIGNIFICANT CHANGE UP (ref 32–36)
MCV RBC AUTO: 79.7 FL — LOW (ref 80–94)
MONOCYTES # BLD AUTO: 0.4 K/UL — SIGNIFICANT CHANGE UP (ref 0–0.8)
MONOCYTES NFR BLD AUTO: 6.1 % — SIGNIFICANT CHANGE UP (ref 3–10)
NEUTROPHILS # BLD AUTO: 4.7 K/UL — SIGNIFICANT CHANGE UP (ref 1.8–8)
NEUTROPHILS NFR BLD AUTO: 66.2 % — SIGNIFICANT CHANGE UP (ref 37–73)
PLATELET # BLD AUTO: 280 K/UL — SIGNIFICANT CHANGE UP (ref 150–400)
POTASSIUM SERPL-MCNC: 5.9 MMOL/L — HIGH (ref 3.5–5.3)
POTASSIUM SERPL-SCNC: 4.7 MMOL/L
POTASSIUM SERPL-SCNC: 5.9 MMOL/L — HIGH (ref 3.5–5.3)
PROT SERPL-MCNC: 6.9 G/DL
PROT SERPL-MCNC: 7.3 G/DL — SIGNIFICANT CHANGE UP (ref 6.6–8.7)
PROTHROM AB SERPL-ACNC: 16.4 SEC — HIGH (ref 9.8–12.7)
PT BLD: 15.1 SEC
RBC # BLD: 4.28 M/UL — LOW (ref 4.6–6.2)
RBC # FLD: 14.7 % — SIGNIFICANT CHANGE UP (ref 11–15.6)
SODIUM SERPL-SCNC: 132 MMOL/L — LOW (ref 135–145)
SODIUM SERPL-SCNC: 134 MMOL/L
TYPE + AB SCN PNL BLD: SIGNIFICANT CHANGE UP
WBC # BLD: 7 K/UL — SIGNIFICANT CHANGE UP (ref 4.8–10.8)
WBC # FLD AUTO: 7 K/UL — SIGNIFICANT CHANGE UP (ref 4.8–10.8)

## 2017-05-02 PROCEDURE — 99285 EMERGENCY DEPT VISIT HI MDM: CPT

## 2017-05-02 PROCEDURE — 93010 ELECTROCARDIOGRAM REPORT: CPT

## 2017-05-02 PROCEDURE — 71010: CPT | Mod: 26

## 2017-05-02 PROCEDURE — 76705 ECHO EXAM OF ABDOMEN: CPT | Mod: 26

## 2017-05-02 RX ORDER — PROCHLORPERAZINE MALEATE 10 MG/1
10 TABLET ORAL EVERY 6 HOURS
Qty: 120 | Refills: 5 | Status: ACTIVE | COMMUNITY
Start: 2017-05-02 | End: 1900-01-01

## 2017-05-02 RX ORDER — GLUCAGON INJECTION, SOLUTION 0.5 MG/.1ML
1 INJECTION, SOLUTION SUBCUTANEOUS ONCE
Qty: 0 | Refills: 0 | Status: DISCONTINUED | OUTPATIENT
Start: 2017-05-02 | End: 2017-05-11

## 2017-05-02 RX ORDER — LISINOPRIL 2.5 MG/1
1 TABLET ORAL
Qty: 0 | Refills: 0 | COMMUNITY

## 2017-05-02 RX ORDER — ONDANSETRON 8 MG/1
8 TABLET, ORALLY DISINTEGRATING ORAL EVERY 8 HOURS
Qty: 90 | Refills: 3 | Status: ACTIVE | COMMUNITY
Start: 2017-05-02 | End: 1900-01-01

## 2017-05-02 RX ORDER — DEXTROSE 50 % IN WATER 50 %
25 SYRINGE (ML) INTRAVENOUS ONCE
Qty: 0 | Refills: 0 | Status: DISCONTINUED | OUTPATIENT
Start: 2017-05-02 | End: 2017-05-11

## 2017-05-02 RX ORDER — SODIUM CHLORIDE 9 MG/ML
1000 INJECTION INTRAMUSCULAR; INTRAVENOUS; SUBCUTANEOUS
Qty: 0 | Refills: 0 | Status: DISCONTINUED | OUTPATIENT
Start: 2017-05-02 | End: 2017-05-03

## 2017-05-02 RX ORDER — PANTOPRAZOLE SODIUM 20 MG/1
40 TABLET, DELAYED RELEASE ORAL
Qty: 0 | Refills: 0 | Status: DISCONTINUED | OUTPATIENT
Start: 2017-05-02 | End: 2017-05-11

## 2017-05-02 RX ORDER — INSULIN LISPRO 100/ML
VIAL (ML) SUBCUTANEOUS
Qty: 0 | Refills: 0 | Status: DISCONTINUED | OUTPATIENT
Start: 2017-05-02 | End: 2017-05-07

## 2017-05-02 RX ORDER — INSULIN LISPRO 100/ML
VIAL (ML) SUBCUTANEOUS AT BEDTIME
Qty: 0 | Refills: 0 | Status: DISCONTINUED | OUTPATIENT
Start: 2017-05-02 | End: 2017-05-11

## 2017-05-02 RX ORDER — DEXTROSE 50 % IN WATER 50 %
1 SYRINGE (ML) INTRAVENOUS ONCE
Qty: 0 | Refills: 0 | Status: DISCONTINUED | OUTPATIENT
Start: 2017-05-02 | End: 2017-05-11

## 2017-05-02 RX ORDER — AMLODIPINE BESYLATE 2.5 MG/1
5 TABLET ORAL DAILY
Qty: 0 | Refills: 0 | Status: DISCONTINUED | OUTPATIENT
Start: 2017-05-02 | End: 2017-05-08

## 2017-05-02 RX ORDER — SODIUM CHLORIDE 9 MG/ML
1000 INJECTION, SOLUTION INTRAVENOUS
Qty: 0 | Refills: 0 | Status: DISCONTINUED | OUTPATIENT
Start: 2017-05-02 | End: 2017-05-11

## 2017-05-02 RX ORDER — DEXTROSE 50 % IN WATER 50 %
12.5 SYRINGE (ML) INTRAVENOUS ONCE
Qty: 0 | Refills: 0 | Status: DISCONTINUED | OUTPATIENT
Start: 2017-05-02 | End: 2017-05-11

## 2017-05-02 RX ORDER — SODIUM CHLORIDE 9 MG/ML
3 INJECTION INTRAMUSCULAR; INTRAVENOUS; SUBCUTANEOUS ONCE
Qty: 0 | Refills: 0 | Status: COMPLETED | OUTPATIENT
Start: 2017-05-02 | End: 2017-05-02

## 2017-05-02 RX ORDER — INSULIN GLARGINE 100 [IU]/ML
40 INJECTION, SOLUTION SUBCUTANEOUS
Qty: 0 | Refills: 0 | COMMUNITY

## 2017-05-02 RX ADMIN — SODIUM CHLORIDE 3 MILLILITER(S): 9 INJECTION INTRAMUSCULAR; INTRAVENOUS; SUBCUTANEOUS at 16:54

## 2017-05-02 RX ADMIN — Medication 2: at 23:40

## 2017-05-02 RX ADMIN — SODIUM CHLORIDE 100 MILLILITER(S): 9 INJECTION INTRAMUSCULAR; INTRAVENOUS; SUBCUTANEOUS at 16:52

## 2017-05-02 NOTE — ED STATDOCS - PSH
H/O arthroscopy of shoulder  2014  History of penile implant  Jan 2016)  Malignant neoplasm of pancreas, unspecified  Whipple procedure; 03/2017
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
## 2017-05-02 NOTE — H&P ADULT - NSHPREVIEWOFSYSTEMS_GEN_ALL_CORE
Patient denies CP, SOB, fever/chills, changes in bowel habits or N/V.   States he is pain free and cholecystostomy has been draining normally with no changes.  Endorses some itching and states he only presented to PMD due to skin yellowing.
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
## 2017-05-02 NOTE — H&P ADULT - NSHPPHYSICALEXAM_GEN_ALL_CORE
AOx3, NAD  Lungs: CTABL, no WRR  CV: RRR  Abd: Soft, NT/ND  Cholecystostomy in place with no surrounding erythema or drainage.  Bilious drainage noted in bulb approx 30cc

## 2017-05-02 NOTE — H&P ADULT - ASSESSMENT
57M with increased bilirubin in the face of pancreatic/duodenal CA  1. PTC tomorrow AM in IR  2. Flush/empty cholecystostomy per routine  3. NPO pMN  4. IVF  5. ISS  6. Continue home meds  7. Case discussed with Dr. Felder, admit patient to surg onc service

## 2017-05-02 NOTE — ED PROVIDER NOTE - OBJECTIVE STATEMENT
HER E BECAUSE BILIRUBIN WENT FROM 1 TO 12 IN ONE WEEK. PT HAS A CHOLECYSTOSTOMY TUBE. SENT BECAUSE IT MAY NOT BE FUNCTIONING PROPERLY. WAS TO HAVE WHIPPLE YESTERDAY BUT WHEN SURGERY OPENED THE ABDOMINAL CAVITY THEY FOUND THE CANCER WAS UNRESECTABLE.  PORT PLACED FOR CHEMO THERAPY. HER E BECAUSE BILIRUBIN WENT FROM 1 TO 12 IN ONE WEEK. PT HAS A CHOLECYSTOSTOMY TUBE. SENT BECAUSE IT MAY NOT BE FUNCTIONING PROPERLY. WAS TO HAVE WHIPPLE YESTERDAY(?) BUT WHEN SURGERY OPENED THE ABDOMINAL CAVITY THEY FOUND THE CANCER WAS UNRESECTABLE.  PORT PLACED FOR CHEMO THERAPY.

## 2017-05-02 NOTE — ED ADULT NURSE NOTE - OBJECTIVE STATEMENT
at bedside per pt he was sent in from his oncologist to have a sono and bili checked.  pt eye are yellow, skin is jaundice.  pt has a gallbladder drain on right side.

## 2017-05-02 NOTE — ED PROVIDER NOTE - PROGRESS NOTE DETAILS
PT SENT FROM DR DESAI [facility]. TOTAL BILIRUBIN WENT FROM 1 TO 1O IN A WEEK. PT HAS CHOLECYSTOSTOMY TUBE. US TODAY SHOWS THAT IT IS FUNCTIONING BUT THE DUCTS OF THE LIVER ARE DILATED INDICATING SOME TYPE OF OBSTRUCTION. CASE DISCUSSED WITH DR AWA CUAB. HE STATES PT NEEDS A PTC. DR LAMBERT CALLED AND CAN DO PROCEDURE TOMORROW. THIS INFORMATION GIVEN TO DR CUBA. HE WANTS PT ADMITTED TO SURGERY SERVICE. I SPOKE WITH PA WHO WILL ADMIT. PT AGREEABLE TO ADMISSION.

## 2017-05-02 NOTE — ED PROVIDER NOTE - MEDICAL DECISION MAKING DETAILS
PT WITH SOME TYPE OF BLOCKAGE OF CHOLECYSTOSTOMY TUBE.+ DUODENAL CANCER. TO HAVE PTC TOMORROW. CONFIRMED WITH DR LAMBERT. ADMIT UNDER DR HEMA CUBA

## 2017-05-02 NOTE — H&P ADULT - NSHPLABSRESULTS_GEN_ALL_CORE
12.1   7.0   )-----------( 280      ( 02 May 2017 16:10 )             34.1   05-02    132<L>  |  94<L>  |  7.0<L>  ----------------------------<  167<H>  5.9<H>   |  27.0  |  <0.20<L>    Ca    9.8      02 May 2017 16:10    TPro  7.3  /  Alb  3.8  /  TBili  13.8<H>  /  DBili  x   /  AST  221<H>  /  ALT  292<H>  /  AlkPhos  877<H>  05-02    PT/INR - ( 02 May 2017 16:10 )   PT: 16.4 sec;   INR: 1.48 ratio         PTT - ( 02 May 2017 16:10 )  PTT:32.3 sec

## 2017-05-02 NOTE — ED STATDOCS - PROGRESS NOTE DETAILS
: Franchesca. 58 y/o M pt w/ PMHx of hepatitis B and colon CA and PSHx of malignant neoplasm of pancreas presents to the ED for FREDI drain change. Pt states that he had an appointment in the clinic and was told to come to ED to change FREDI drain; pt's bilirubin was noted to be high. Pt has appointment for chemo on Thursday and was told he needs an MRI prior to starting chemo. Pt denies fever, chills, N/V/D, or any other complaints. NKDA. Pt's surgeon: Dr. Sosa. Oncologist: Dr. Copeland. Jaundice, sclera icteric, abd soft, well healed surgical scar, FREDI drain exiting from RUQ, draining cloudy bilious fluid. Will get labs, EKG, pt needs admission. Oncologist: [phone]. Focused evaluation, orders entered, placed in main for further evaluation by another provider.
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
## 2017-05-02 NOTE — ED ADULT TRIAGE NOTE - CHIEF COMPLAINT QUOTE
pt presents to ED with sent by PMD for replacement of FREDI drain from pancreas. pt is jaundice, pt has hx of colon cancer.

## 2017-05-02 NOTE — H&P ADULT - HISTORY OF PRESENT ILLNESS
57M with unresectable duodenal/pancreatic mass scheduled to start chemo this Thursday presented to his PMD today with jaundice.  He was found to have an elevated bilirubin at 12 reportedly despite having cholecystostomy in place with normal output.  Patient underwent abdominal US which revealed dilation of the CBD as well as intrahepatic bile duct.

## 2017-05-02 NOTE — ED STATDOCS - DETAILS:
IAvni, performed the initial face to face focussed bedside interview with this patient regarding history of present illness as well as a targetted review of systems and an independent physical examination. This brief note is representative of this interaction during which we determined the patient should be transferred to the main ED for further evaluation and treatment and a complete note to be completed by the primary provider.

## 2017-05-03 LAB
ALBUMIN SERPL ELPH-MCNC: 3.2 G/DL — LOW (ref 3.3–5.2)
ALP SERPL-CCNC: 821 U/L — HIGH (ref 40–120)
ALT FLD-CCNC: 248 U/L — HIGH
ANION GAP SERPL CALC-SCNC: 10 MMOL/L — SIGNIFICANT CHANGE UP (ref 5–17)
AST SERPL-CCNC: 157 U/L — HIGH
BILIRUB SERPL-MCNC: 13.2 MG/DL — HIGH (ref 0.4–2)
BUN SERPL-MCNC: 7 MG/DL — LOW (ref 8–20)
CALCIUM SERPL-MCNC: 9.4 MG/DL — SIGNIFICANT CHANGE UP (ref 8.6–10.2)
CHLORIDE SERPL-SCNC: 96 MMOL/L — LOW (ref 98–107)
CO2 SERPL-SCNC: 27 MMOL/L — SIGNIFICANT CHANGE UP (ref 22–29)
CREAT SERPL-MCNC: <0.2 MG/DL — LOW (ref 0.5–1.3)
GLUCOSE SERPL-MCNC: 195 MG/DL — HIGH (ref 70–115)
HCT VFR BLD CALC: 31.7 % — LOW (ref 42–52)
HGB BLD-MCNC: 11.4 G/DL — LOW (ref 14–18)
MCHC RBC-ENTMCNC: 27.9 PG — SIGNIFICANT CHANGE UP (ref 27–31)
MCHC RBC-ENTMCNC: 36 G/DL — SIGNIFICANT CHANGE UP (ref 32–36)
MCV RBC AUTO: 77.7 FL — LOW (ref 80–94)
PLATELET # BLD AUTO: 277 K/UL — SIGNIFICANT CHANGE UP (ref 150–400)
POTASSIUM SERPL-MCNC: 4.5 MMOL/L — SIGNIFICANT CHANGE UP (ref 3.5–5.3)
POTASSIUM SERPL-SCNC: 4.5 MMOL/L — SIGNIFICANT CHANGE UP (ref 3.5–5.3)
PROT SERPL-MCNC: 6.5 G/DL — LOW (ref 6.6–8.7)
RBC # BLD: 4.08 M/UL — LOW (ref 4.6–6.2)
RBC # FLD: 15 % — SIGNIFICANT CHANGE UP (ref 11–15.6)
SODIUM SERPL-SCNC: 133 MMOL/L — LOW (ref 135–145)
WBC # BLD: 6.4 K/UL — SIGNIFICANT CHANGE UP (ref 4.8–10.8)
WBC # FLD AUTO: 6.4 K/UL — SIGNIFICANT CHANGE UP (ref 4.8–10.8)

## 2017-05-03 PROCEDURE — 47534 PLMT BILIARY DRAINAGE CATH: CPT

## 2017-05-03 RX ADMIN — AMLODIPINE BESYLATE 5 MILLIGRAM(S): 2.5 TABLET ORAL at 05:43

## 2017-05-03 RX ADMIN — Medication 2: at 10:55

## 2017-05-03 RX ADMIN — Medication: at 18:13

## 2017-05-03 RX ADMIN — PANTOPRAZOLE SODIUM 40 MILLIGRAM(S): 20 TABLET, DELAYED RELEASE ORAL at 05:43

## 2017-05-03 RX ADMIN — Medication 2: at 12:38

## 2017-05-03 NOTE — BRIEF OPERATIVE NOTE - OPERATION/FINDINGS
long cbd obstruction.  placed 8.5 F int/external biliary drain.  distal tip is beyond the obstruction in the cbd.  The tube is capped for internal drainage.  Please place to external drainage if fever, right upper abd pain,. worsening lft, or leakage at site. Both the biliary drain (white) and the cholecystostomy tube (blue) should be forward flushed slowly 10 cc normal saline q24 hrs (do not aspirate)

## 2017-05-03 NOTE — BRIEF OPERATIVE NOTE - PROCEDURE
Cholangiogram, percutaneous transhepatic, with biliary drainage and if indicated placement of stent  05/03/2017    Active  HALPER

## 2017-05-04 ENCOUNTER — TRANSCRIPTION ENCOUNTER (OUTPATIENT)
Age: 57
End: 2017-05-04

## 2017-05-04 ENCOUNTER — APPOINTMENT (OUTPATIENT)
Dept: INFUSION THERAPY | Facility: CLINIC | Age: 57
End: 2017-05-04

## 2017-05-04 DIAGNOSIS — C17.0 MALIGNANT NEOPLASM OF DUODENUM: ICD-10-CM

## 2017-05-04 DIAGNOSIS — C25.9 MALIGNANT NEOPLASM OF PANCREAS, UNSPECIFIED: ICD-10-CM

## 2017-05-04 LAB
ALBUMIN SERPL ELPH-MCNC: 3.2 G/DL — LOW (ref 3.3–5.2)
ALP SERPL-CCNC: 829 U/L — HIGH (ref 40–120)
ALT FLD-CCNC: 206 U/L — HIGH
ANION GAP SERPL CALC-SCNC: 14 MMOL/L — SIGNIFICANT CHANGE UP (ref 5–17)
AST SERPL-CCNC: 99 U/L — HIGH
BILIRUB SERPL-MCNC: 14.3 MG/DL — HIGH (ref 0.4–2)
BUN SERPL-MCNC: 8 MG/DL — SIGNIFICANT CHANGE UP (ref 8–20)
CALCIUM SERPL-MCNC: 9.3 MG/DL — SIGNIFICANT CHANGE UP (ref 8.6–10.2)
CHLORIDE SERPL-SCNC: 92 MMOL/L — LOW (ref 98–107)
CO2 SERPL-SCNC: 23 MMOL/L — SIGNIFICANT CHANGE UP (ref 22–29)
CREAT SERPL-MCNC: 0.23 MG/DL — LOW (ref 0.5–1.3)
GLUCOSE SERPL-MCNC: 272 MG/DL — HIGH (ref 70–115)
HCT VFR BLD CALC: 34.6 % — LOW (ref 42–52)
HGB BLD-MCNC: 12 G/DL — LOW (ref 14–18)
MCHC RBC-ENTMCNC: 28 PG — SIGNIFICANT CHANGE UP (ref 27–31)
MCHC RBC-ENTMCNC: 34.7 G/DL — SIGNIFICANT CHANGE UP (ref 32–36)
MCV RBC AUTO: 80.8 FL — SIGNIFICANT CHANGE UP (ref 80–94)
PLATELET # BLD AUTO: 241 K/UL — SIGNIFICANT CHANGE UP (ref 150–400)
POTASSIUM SERPL-MCNC: 3.9 MMOL/L — SIGNIFICANT CHANGE UP (ref 3.5–5.3)
POTASSIUM SERPL-SCNC: 3.9 MMOL/L — SIGNIFICANT CHANGE UP (ref 3.5–5.3)
PROT SERPL-MCNC: 6.7 G/DL — SIGNIFICANT CHANGE UP (ref 6.6–8.7)
RBC # BLD: 4.28 M/UL — LOW (ref 4.6–6.2)
RBC # FLD: 15.3 % — SIGNIFICANT CHANGE UP (ref 11–15.6)
SODIUM SERPL-SCNC: 129 MMOL/L — LOW (ref 135–145)
WBC # BLD: 6.5 K/UL — SIGNIFICANT CHANGE UP (ref 4.8–10.8)
WBC # FLD AUTO: 6.5 K/UL — SIGNIFICANT CHANGE UP (ref 4.8–10.8)

## 2017-05-04 PROCEDURE — 99231 SBSQ HOSP IP/OBS SF/LOW 25: CPT

## 2017-05-04 PROCEDURE — 99232 SBSQ HOSP IP/OBS MODERATE 35: CPT | Mod: 24

## 2017-05-04 RX ADMIN — Medication 6: at 12:21

## 2017-05-04 RX ADMIN — Medication 4: at 09:10

## 2017-05-04 RX ADMIN — Medication 6: at 17:21

## 2017-05-04 RX ADMIN — AMLODIPINE BESYLATE 5 MILLIGRAM(S): 2.5 TABLET ORAL at 05:10

## 2017-05-04 RX ADMIN — Medication 2: at 00:12

## 2017-05-04 RX ADMIN — PANTOPRAZOLE SODIUM 40 MILLIGRAM(S): 20 TABLET, DELAYED RELEASE ORAL at 05:10
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
## 2017-05-04 NOTE — DISCHARGE NOTE ADULT - PATIENT PORTAL LINK FT
“You can access the [facility] Patient Portal, offered by [facility], by registering with the following website: [email or web address]”

## 2017-05-04 NOTE — DISCHARGE NOTE ADULT - HOSPITAL COURSE
57M with unresectable duodenal/pancreatic mass scheduled to start chemo this Thursday presented to his PMD today with jaundice.  He was found to have an elevated bilirubin at 12 reportedly despite having cholecystostomy in place with normal output.  Patient underwent abdominal US which revealed dilation of the CBD as well as intrahepatic bile duct.      Pt went for IR procedure 5/3/17:    Post-Op Dx:  Biliary obstruction  05/03/2017    Active  Roberto Carlos Li    Procedure:   Procedure:  Cholangiogram, percutaneous transhepatic, with biliary drainage and if indicated placement of stent  05/03/2017    Active  DANYELLE.      Operative Findings:  · Operative Findings	long cbd obstruction.  placed 8.5 F int/external biliary drain.  distal tip is beyond the obstruction in the cbd.  The tube is capped for internal drainage.  Please place to external drainage if fever, right upper abd pain,. worsening lft, or leakage at site. Both the biliary drain (white) and the cholecystostomy tube (blue) should be forward flushed slowly 10 cc normal saline q24 hrs (do not aspirate)  Pt doing well Post procedure #1: no n/v, kvng po well, no fever, no abd pain, no leakage around tube,   Pt to be discharged home for out chemotherapy today. 57M with unresectable duodenal/pancreatic mass scheduled to start chemo this Thursday presented to his PMD today with jaundice.  He was found to have an elevated bilirubin at 12 reportedly despite having cholecystostomy in place with normal output.  Patient underwent abdominal US which revealed dilation of the CBD as well as intrahepatic bile duct.      Pt went for IR procedure 5/3/17:    Post-Op Dx:  Biliary obstruction  05/03/2017    Active  Roberto Carlos Li    Procedure:   Procedure:  Cholangiogram, percutaneous transhepatic, with biliary drainage and if indicated placement of stent  05/03/2017    Active  DANYELLE.      Operative Findings:  · Operative Findings	long cbd obstruction.  placed 8.5 F int/external biliary drain.  distal tip is beyond the obstruction in the cbd.  The tube is capped for internal drainage.  Please place to external drainage if fever, right upper abd pain,. worsening lft, or leakage at site. Both the biliary drain (white) and the cholecystostomy tube (blue) should be forward flushed slowly 10 cc normal saline q24 hrs (do not aspirate)  Pt doing well Post procedure #1: no n/v, kvng po well, no fever, no abd pain, no leakage around tube,   Pt to be discharged home for out chemotherapy today.     Developed post operative fever to 102, tachycardia. Blood cultures collected show gram negatuve rids, Biliary drain open to bedside drainage. Zosyn started with resolution of fever. Patient without pain, nausea or vomitting. 57M with unresectable duodenal/pancreatic mass scheduled to start chemo this Thursday presented to his PMD today with jaundice.  He was found to have an elevated bilirubin at 12 reportedly despite having cholecystostomy in place with normal output.  Patient underwent abdominal US which revealed dilation of the CBD as well as intrahepatic bile duct.      Pt went for IR procedure 5/3/17:    Post-Op Dx:  Biliary obstruction  05/03/2017   Procedure:  Cholangiogram, percutaneous transhepatic, with biliary drainage and if indicated placement of stent  05/03/2017      Developed post operative fever to 102, tachycardia. Blood cultures collected show gram negatuve rids, Biliary drain open to bedside drainage. Bcx sent and were positive for e.coli, zosyn started with resolution of fever and normalization of WBC, blood cx negative upon discharge, patient without pain, nausea or vomiting. Patient will be discharged home on 2 weeks of zosyn and will follow-up with Dr. Felder and Dr. Arguello as outpatient.

## 2017-05-04 NOTE — DISCHARGE NOTE ADULT - CARE PROVIDERS DIRECT ADDRESSES
[email or web address]

## 2017-05-04 NOTE — DISCHARGE NOTE ADULT - INSTRUCTIONS
no restrictions Both the biliary drain (white) and the cholecystostomy tube (blue) should be forward flushed slowly 10 cc normal saline q24 hrs (do not aspirate) no restrictions, avoid tylenol or other drugs metabolized by liver.

## 2017-05-04 NOTE — DISCHARGE NOTE ADULT - CARE PROVIDER_API CALL
Stephane Felder), Surgery; Surgical Oncology  [address] [place] [address]  Phone: [phone]  Fax: [phone] Stephane Felder), Surgery; Surgical Oncology  [address] [place] [address]  Phone: [phone]  Fax: [phone]    Sahil Arguello), Infectious Disease; Internal Medicine  [address] [place] [address]  Phone: [phone]  Fax: [phone]

## 2017-05-04 NOTE — DISCHARGE NOTE ADULT - NS AS ACTIVITY OBS
Walking-Outdoors allowed/Stairs allowed/Do not make important decisions/No Heavy lifting/straining/Walking-Indoors allowed
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
## 2017-05-04 NOTE — DISCHARGE NOTE ADULT - PLAN OF CARE
resume diet and activities of daily living follow up with Dr Felder as outpt, call for appt Follow up with Dr Felder within 1 week of discharge as an outpatient, call for appointment.   Antibiotics have been prescribed for you, visiting nurse services will come to your residence and infuse your antibioitcs, they will also perform drain care on these visits.   Please take full course of antibiotics unless otherwise instructed by infectious disease physician.   Return to the hospital if you experience new or worsening abdominal pain, feeling of malaise, fever, chills, confusion, double vision, blurred vision, chest pain, shortness of breath, nausea or vomiting or any signs of infection such as redness or foul smelling oozing from drain sites.

## 2017-05-04 NOTE — DISCHARGE NOTE ADULT - CARE PLAN
Principal Discharge DX:	Duodenal adenocarcinoma  Goal:	resume diet and activities of daily living  Instructions for follow-up, activity and diet:	follow up with Dr Felder as outpt, call for appt Principal Discharge DX:	Duodenal adenocarcinoma  Goal:	resume diet and activities of daily living  Instructions for follow-up, activity and diet:	Follow up with Dr Felder within 1 week of discharge as an outpatient, call for appointment.   Antibiotics have been prescribed for you, visiting nurse services will come to your residence and infuse your antibioitcs, they will also perform drain care on these visits.   Please take full course of antibiotics unless otherwise instructed by infectious disease physician.   Return to the hospital if you experience new or worsening abdominal pain, feeling of malaise, fever, chills, confusion, double vision, blurred vision, chest pain, shortness of breath, nausea or vomiting or any signs of infection such as redness or foul smelling oozing from drain sites.

## 2017-05-04 NOTE — DISCHARGE NOTE ADULT - FINDINGS/TREATMENT
Both the biliary drain (white) and the cholecystostomy tube (blue) should be forward flushed slowly 10 cc normal saline q24 hrs (do not aspirate)

## 2017-05-04 NOTE — DISCHARGE NOTE ADULT - MEDICATION SUMMARY - MEDICATIONS TO STOP TAKING
I will STOP taking the medications listed below when I get home from the hospital:  None I will STOP taking the medications listed below when I get home from the hospital:    acetaminophen 325 mg oral tablet  -- 2 tab(s) by mouth

## 2017-05-04 NOTE — DISCHARGE NOTE ADULT - MEDICATION SUMMARY - MEDICATIONS TO TAKE
I will START or STAY ON the medications listed below when I get home from the hospital:    acetaminophen 325 mg oral tablet  -- 2 tab(s) by mouth   -- Indication: For home med    insulin glargine 100 units/mL subcutaneous solution  -- 38 unit(s) subcutaneous once a day (at bedtime)  -- Indication: For home med    amlodipine 5 mg oral tablet  -- 1 tab(s) by mouth once a day in pm  -- Indication: For home med    pantoprazole 40 mg oral delayed release tablet  -- 1 tab(s) by mouth 2 times a day (before meals)  -- Indication: For home med I will START or STAY ON the medications listed below when I get home from the hospital:    acetaminophen 325 mg oral tablet  -- 2 tab(s) by mouth   -- Indication: For home med    oxyCODONE 5 mg oral tablet  -- 1 tab(s) by mouth every 3 hours, As needed, Moderate Pain (4 - 6)  -- Indication: For pain    oxyCODONE 10 mg oral tablet  -- 1 tab(s) by mouth every 3 hours, As needed, Severe Pain (7 - 10)  -- Indication: For pain    insulin glargine 100 units/mL subcutaneous solution  -- 38 unit(s) subcutaneous once a day (at bedtime)  -- Indication: For home med    amlodipine 5 mg oral tablet  -- 1 tab(s) by mouth once a day in pm  -- Indication: For home med    pantoprazole 40 mg oral delayed release tablet  -- 1 tab(s) by mouth 2 times a day (before meals)  -- Indication: For home med I will START or STAY ON the medications listed below when I get home from the hospital:    insulin glargine 100 units/mL subcutaneous solution  -- 38 unit(s) subcutaneous once a day (at bedtime)  -- Indication: For home med    amlodipine 5 mg oral tablet  -- 1 tab(s) by mouth once a day in pm  -- Indication: For home med    Zosyn 3 g-0.375 g/50 mL intravenous solution  -- 3.375 gram(s) intravenous every 6 hours seekly cbc and cmp  through may 20th   -- Indication: For blood infection    pantoprazole 40 mg oral delayed release tablet  -- 1 tab(s) by mouth 2 times a day (before meals)  -- Indication: For home med I will START or STAY ON the medications listed below when I get home from the hospital:    insulin glargine 100 units/mL subcutaneous solution  -- 38 unit(s) subcutaneous once a day (at bedtime)  -- Indication: For home med    amlodipine 5 mg oral tablet  -- 1 tab(s) by mouth once a day in pm  -- Indication: For home med    magnesium oxide 400 mg (241.3 mg elemental magnesium) oral tablet  -- 1 tab(s) by mouth 3 times a day (with meals)  -- Indication: For hypomagnesiumemia    Zosyn 3 g-0.375 g/50 mL intravenous solution  -- 3.375 gram(s) intravenous every 6 hours seekly cbc and cmp  through may 20th   -- Indication: For blood infection    pantoprazole 40 mg oral delayed release tablet  -- 1 tab(s) by mouth 2 times a day (before meals)  -- Indication: For home med I will START or STAY ON the medications listed below when I get home from the hospital:    insulin glargine 100 units/mL subcutaneous solution  -- 20 unit(s) subcutaneous once a day (at bedtime) , may increase by 5 units every 3 days if f/s > 200 up to 38 units .  -- Indication: For Diabetes    amlodipine 5 mg oral tablet  -- 1 tab(s) by mouth once a day in pm  -- Indication: For home med    magnesium oxide 400 mg (241.3 mg elemental magnesium) oral tablet  -- 1 tab(s) by mouth 3 times a day (with meals)  -- Indication: For hypomagnesiumemia    Zosyn 3 g-0.375 g/50 mL intravenous solution  -- 3.375 gram(s) intravenous every 6 hours seekly cbc and cmp  through may 20th   -- Indication: For blood infection    pantoprazole 40 mg oral delayed release tablet  -- 1 tab(s) by mouth 2 times a day (before meals)  -- Indication: For home med

## 2017-05-04 NOTE — PROGRESS NOTE ADULT - SUBJECTIVE AND OBJECTIVE BOX
pt pod 1 sp biliary tube placement under MAC with sedation. Tolerated well. Denies any A/c.   pt with no n/v @ this time. using pain meds as ordered/needed with some pain relief. vss. spont vent. no issues with anesthesia at this time. pt resting comfortably @ this time.

## 2017-05-04 NOTE — DISCHARGE NOTE ADULT - HOME CARE AGENCY
[facility]  [phone] [facility]   [phone],  [facility]  [phone]

## 2017-05-05 LAB
ALBUMIN SERPL ELPH-MCNC: 3.3 G/DL — SIGNIFICANT CHANGE UP (ref 3.3–5.2)
ALBUMIN SERPL ELPH-MCNC: 3.7 G/DL — SIGNIFICANT CHANGE UP (ref 3.3–5.2)
ALP SERPL-CCNC: 707 U/L — HIGH (ref 40–120)
ALP SERPL-CCNC: 710 U/L — HIGH (ref 40–120)
ALT FLD-CCNC: 149 U/L — HIGH
ALT FLD-CCNC: 158 U/L — HIGH
ANION GAP SERPL CALC-SCNC: 14 MMOL/L — SIGNIFICANT CHANGE UP (ref 5–17)
ANION GAP SERPL CALC-SCNC: 14 MMOL/L — SIGNIFICANT CHANGE UP (ref 5–17)
AST SERPL-CCNC: 43 U/L — HIGH
AST SERPL-CCNC: 48 U/L — HIGH
BILIRUB SERPL-MCNC: 9.2 MG/DL — HIGH (ref 0.4–2)
BILIRUB SERPL-MCNC: 9.3 MG/DL — HIGH (ref 0.4–2)
BUN SERPL-MCNC: 10 MG/DL — SIGNIFICANT CHANGE UP (ref 8–20)
BUN SERPL-MCNC: 8 MG/DL — SIGNIFICANT CHANGE UP (ref 8–20)
CALCIUM SERPL-MCNC: 9.2 MG/DL — SIGNIFICANT CHANGE UP (ref 8.6–10.2)
CALCIUM SERPL-MCNC: 9.6 MG/DL — SIGNIFICANT CHANGE UP (ref 8.6–10.2)
CHLORIDE SERPL-SCNC: 92 MMOL/L — LOW (ref 98–107)
CHLORIDE SERPL-SCNC: 93 MMOL/L — LOW (ref 98–107)
CO2 SERPL-SCNC: 23 MMOL/L — SIGNIFICANT CHANGE UP (ref 22–29)
CO2 SERPL-SCNC: 25 MMOL/L — SIGNIFICANT CHANGE UP (ref 22–29)
CREAT SERPL-MCNC: 0.27 MG/DL — LOW (ref 0.5–1.3)
CREAT SERPL-MCNC: 0.36 MG/DL — LOW (ref 0.5–1.3)
GLUCOSE SERPL-MCNC: 207 MG/DL — HIGH (ref 70–115)
GLUCOSE SERPL-MCNC: 233 MG/DL — HIGH (ref 70–115)
POTASSIUM SERPL-MCNC: 4 MMOL/L — SIGNIFICANT CHANGE UP (ref 3.5–5.3)
POTASSIUM SERPL-MCNC: 4.2 MMOL/L — SIGNIFICANT CHANGE UP (ref 3.5–5.3)
POTASSIUM SERPL-SCNC: 4 MMOL/L — SIGNIFICANT CHANGE UP (ref 3.5–5.3)
POTASSIUM SERPL-SCNC: 4.2 MMOL/L — SIGNIFICANT CHANGE UP (ref 3.5–5.3)
PROT SERPL-MCNC: 6.9 G/DL — SIGNIFICANT CHANGE UP (ref 6.6–8.7)
PROT SERPL-MCNC: 7 G/DL — SIGNIFICANT CHANGE UP (ref 6.6–8.7)
SODIUM SERPL-SCNC: 130 MMOL/L — LOW (ref 135–145)
SODIUM SERPL-SCNC: 131 MMOL/L — LOW (ref 135–145)

## 2017-05-05 RX ORDER — ACETAMINOPHEN 500 MG
650 TABLET ORAL ONCE
Qty: 0 | Refills: 0 | Status: COMPLETED | OUTPATIENT
Start: 2017-05-05 | End: 2017-05-05

## 2017-05-05 RX ADMIN — AMLODIPINE BESYLATE 5 MILLIGRAM(S): 2.5 TABLET ORAL at 05:27

## 2017-05-05 RX ADMIN — PANTOPRAZOLE SODIUM 40 MILLIGRAM(S): 20 TABLET, DELAYED RELEASE ORAL at 05:27

## 2017-05-05 RX ADMIN — Medication 2: at 17:38

## 2017-05-05 RX ADMIN — Medication 650 MILLIGRAM(S): at 16:30

## 2017-05-05 RX ADMIN — Medication 4: at 08:10

## 2017-05-05 RX ADMIN — Medication 4: at 12:20

## 2017-05-05 RX ADMIN — Medication 650 MILLIGRAM(S): at 17:21

## 2017-05-05 NOTE — PROGRESS NOTE ADULT - PROBLEM SELECTOR PROBLEM 2
Malignant neoplasm of pancreas, unspecified location of malignancy
Malignant neoplasm of pancreas, unspecified location of malignancy

## 2017-05-05 NOTE — PROGRESS NOTE ADULT - PROBLEM SELECTOR PLAN 1
continue with CBD bag unclamped to gravity bag, continue PTC drain to drainage bag  F/U morning labs   Monitor fever   d/c pending tomorrow   Plan per Bradford
uncap CBD drain and keep to drainage, recheck labs, hold chemo unitl bili normalizes, plan per Dr Felder
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
## 2017-05-06 LAB
ALBUMIN SERPL ELPH-MCNC: 3.1 G/DL — LOW (ref 3.3–5.2)
ALP SERPL-CCNC: 573 U/L — HIGH (ref 40–120)
ALT FLD-CCNC: 104 U/L — HIGH
ANION GAP SERPL CALC-SCNC: 12 MMOL/L — SIGNIFICANT CHANGE UP (ref 5–17)
AST SERPL-CCNC: 30 U/L — SIGNIFICANT CHANGE UP
BILIRUB SERPL-MCNC: 9.7 MG/DL — HIGH (ref 0.4–2)
BUN SERPL-MCNC: 15 MG/DL — SIGNIFICANT CHANGE UP (ref 8–20)
CALCIUM SERPL-MCNC: 9 MG/DL — SIGNIFICANT CHANGE UP (ref 8.6–10.2)
CHLORIDE SERPL-SCNC: 90 MMOL/L — LOW (ref 98–107)
CO2 SERPL-SCNC: 25 MMOL/L — SIGNIFICANT CHANGE UP (ref 22–29)
CREAT SERPL-MCNC: 0.54 MG/DL — SIGNIFICANT CHANGE UP (ref 0.5–1.3)
GLUCOSE SERPL-MCNC: 265 MG/DL — HIGH (ref 70–115)
GRAM STN FLD: SIGNIFICANT CHANGE UP
HCT VFR BLD CALC: 32.1 % — LOW (ref 42–52)
HGB BLD-MCNC: 10.8 G/DL — LOW (ref 14–18)
LACTATE BLDV-MCNC: 1.8 MMOL/L — SIGNIFICANT CHANGE UP (ref 0.5–2)
MCHC RBC-ENTMCNC: 27.9 PG — SIGNIFICANT CHANGE UP (ref 27–31)
MCHC RBC-ENTMCNC: 33.6 G/DL — SIGNIFICANT CHANGE UP (ref 32–36)
MCV RBC AUTO: 82.9 FL — SIGNIFICANT CHANGE UP (ref 80–94)
PLATELET # BLD AUTO: 186 K/UL — SIGNIFICANT CHANGE UP (ref 150–400)
POTASSIUM SERPL-MCNC: 4.2 MMOL/L — SIGNIFICANT CHANGE UP (ref 3.5–5.3)
POTASSIUM SERPL-SCNC: 4.2 MMOL/L — SIGNIFICANT CHANGE UP (ref 3.5–5.3)
PROT SERPL-MCNC: 6.5 G/DL — LOW (ref 6.6–8.7)
RBC # BLD: 3.87 M/UL — LOW (ref 4.6–6.2)
RBC # FLD: 15.3 % — SIGNIFICANT CHANGE UP (ref 11–15.6)
SODIUM SERPL-SCNC: 127 MMOL/L — LOW (ref 135–145)
SPECIMEN SOURCE: SIGNIFICANT CHANGE UP
WBC # BLD: 14 K/UL — HIGH (ref 4.8–10.8)
WBC # FLD AUTO: 14 K/UL — HIGH (ref 4.8–10.8)

## 2017-05-06 RX ORDER — IBUPROFEN 200 MG
600 TABLET ORAL ONCE
Qty: 0 | Refills: 0 | Status: COMPLETED | OUTPATIENT
Start: 2017-05-06 | End: 2017-05-06

## 2017-05-06 RX ORDER — SODIUM CHLORIDE 9 MG/ML
1000 INJECTION INTRAMUSCULAR; INTRAVENOUS; SUBCUTANEOUS ONCE
Qty: 0 | Refills: 0 | Status: COMPLETED | OUTPATIENT
Start: 2017-05-06 | End: 2017-05-06

## 2017-05-06 RX ORDER — SODIUM CHLORIDE 9 MG/ML
1000 INJECTION INTRAMUSCULAR; INTRAVENOUS; SUBCUTANEOUS
Qty: 0 | Refills: 0 | Status: DISCONTINUED | OUTPATIENT
Start: 2017-05-06 | End: 2017-05-10

## 2017-05-06 RX ORDER — PIPERACILLIN AND TAZOBACTAM 4; .5 G/20ML; G/20ML
3.38 INJECTION, POWDER, LYOPHILIZED, FOR SOLUTION INTRAVENOUS EVERY 8 HOURS
Qty: 0 | Refills: 0 | Status: DISCONTINUED | OUTPATIENT
Start: 2017-05-06 | End: 2017-05-11

## 2017-05-06 RX ORDER — PIPERACILLIN AND TAZOBACTAM 4; .5 G/20ML; G/20ML
3.38 INJECTION, POWDER, LYOPHILIZED, FOR SOLUTION INTRAVENOUS ONCE
Qty: 0 | Refills: 0 | Status: COMPLETED | OUTPATIENT
Start: 2017-05-06 | End: 2017-05-06

## 2017-05-06 RX ORDER — OXYCODONE HYDROCHLORIDE 5 MG/1
10 TABLET ORAL
Qty: 0 | Refills: 0 | Status: DISCONTINUED | OUTPATIENT
Start: 2017-05-06 | End: 2017-05-11

## 2017-05-06 RX ORDER — OXYCODONE HYDROCHLORIDE 5 MG/1
5 TABLET ORAL
Qty: 0 | Refills: 0 | Status: DISCONTINUED | OUTPATIENT
Start: 2017-05-06 | End: 2017-05-11

## 2017-05-06 RX ADMIN — Medication 6: at 08:29

## 2017-05-06 RX ADMIN — PANTOPRAZOLE SODIUM 40 MILLIGRAM(S): 20 TABLET, DELAYED RELEASE ORAL at 05:59

## 2017-05-06 RX ADMIN — Medication 6: at 18:31

## 2017-05-06 RX ADMIN — SODIUM CHLORIDE 1000 MILLILITER(S): 9 INJECTION INTRAMUSCULAR; INTRAVENOUS; SUBCUTANEOUS at 20:38

## 2017-05-06 RX ADMIN — Medication 6: at 13:18

## 2017-05-06 RX ADMIN — PIPERACILLIN AND TAZOBACTAM 200 GRAM(S): 4; .5 INJECTION, POWDER, LYOPHILIZED, FOR SOLUTION INTRAVENOUS at 21:42

## 2017-05-06 RX ADMIN — Medication 2: at 23:58

## 2017-05-06 NOTE — PROGRESS NOTE ADULT - SUBJECTIVE AND OBJECTIVE BOX
POD#3 s/p PTC placement  Had defective FREDI drain today, was not draining, although it doesnt seem to usually drain much anyway.  FREDI drain bulb replaced and suctioning.   Pt denies complaints, feels well. Has not had any pain, nausea or vomitting.   +BM normal. Patient feels jaundice is improved and wants to know if his numbers improved enough for chemo to start.    Vitals - -119 SBP  afebrile   NAD, A&ox3, interviewed with telephone , wife and son at bedside, jaundice  Chset: BS BL   Abd: soft, nontender, RUQ drain to leg bag ~ 150 yellow fluid, and FREDI drain to RUQ replaced  Ext: no edema    From today..  WBC increased from 6 to 14   Blood cx: positive for gram neg rods   Drain cx: few gram neg rods  Hyponatremia persist with decline from 131 to 129 today.     A/P: Sepsis / Bacteremia - clinically stable.   Dr Felder aware  Wants to start zosyn.   Going to bolus and start NS @100 since pt meets criteria for sepsis and bacteremia  Will reassess UOP after bolus.

## 2017-05-07 LAB
-  AMIKACIN: SIGNIFICANT CHANGE UP
-  AMPICILLIN/SULBACTAM: SIGNIFICANT CHANGE UP
-  AMPICILLIN: SIGNIFICANT CHANGE UP
-  AZTREONAM: SIGNIFICANT CHANGE UP
-  CEFAZOLIN: SIGNIFICANT CHANGE UP
-  CEFEPIME: SIGNIFICANT CHANGE UP
-  CEFOXITIN: SIGNIFICANT CHANGE UP
-  CEFTAZIDIME: SIGNIFICANT CHANGE UP
-  CEFTRIAXONE: SIGNIFICANT CHANGE UP
-  CIPROFLOXACIN: SIGNIFICANT CHANGE UP
-  ERTAPENEM: SIGNIFICANT CHANGE UP
-  GENTAMICIN: SIGNIFICANT CHANGE UP
-  IMIPENEM: SIGNIFICANT CHANGE UP
-  LEVOFLOXACIN: SIGNIFICANT CHANGE UP
-  MEROPENEM: SIGNIFICANT CHANGE UP
-  PIPERACILLIN/TAZOBACTAM: SIGNIFICANT CHANGE UP
-  TOBRAMYCIN: SIGNIFICANT CHANGE UP
-  TRIMETHOPRIM/SULFAMETHOXAZOLE: SIGNIFICANT CHANGE UP
ALBUMIN SERPL ELPH-MCNC: 2.6 G/DL — LOW (ref 3.3–5.2)
ALP SERPL-CCNC: 412 U/L — HIGH (ref 40–120)
ALT FLD-CCNC: 66 U/L — HIGH
ANION GAP SERPL CALC-SCNC: 17 MMOL/L — SIGNIFICANT CHANGE UP (ref 5–17)
AST SERPL-CCNC: 27 U/L — SIGNIFICANT CHANGE UP
BILIRUB SERPL-MCNC: 9.3 MG/DL — HIGH (ref 0.4–2)
BUN SERPL-MCNC: 15 MG/DL — SIGNIFICANT CHANGE UP (ref 8–20)
CALCIUM SERPL-MCNC: 8.7 MG/DL — SIGNIFICANT CHANGE UP (ref 8.6–10.2)
CHLORIDE SERPL-SCNC: 93 MMOL/L — LOW (ref 98–107)
CO2 SERPL-SCNC: 22 MMOL/L — SIGNIFICANT CHANGE UP (ref 22–29)
CREAT SERPL-MCNC: 0.49 MG/DL — LOW (ref 0.5–1.3)
CULTURE RESULTS: SIGNIFICANT CHANGE UP
CULTURE RESULTS: SIGNIFICANT CHANGE UP
GLUCOSE SERPL-MCNC: 241 MG/DL — HIGH (ref 70–115)
HCT VFR BLD CALC: 26.8 % — LOW (ref 42–52)
HGB BLD-MCNC: 9.3 G/DL — LOW (ref 14–18)
MAGNESIUM SERPL-MCNC: 1.2 MG/DL — LOW (ref 1.8–2.5)
MCHC RBC-ENTMCNC: 27.6 PG — SIGNIFICANT CHANGE UP (ref 27–31)
MCHC RBC-ENTMCNC: 34.7 G/DL — SIGNIFICANT CHANGE UP (ref 32–36)
MCV RBC AUTO: 79.5 FL — LOW (ref 80–94)
METHOD TYPE: SIGNIFICANT CHANGE UP
PHOSPHATE SERPL-MCNC: 1.8 MG/DL — LOW (ref 2.4–4.7)
PLATELET # BLD AUTO: 117 K/UL — LOW (ref 150–400)
POTASSIUM SERPL-MCNC: 3.6 MMOL/L — SIGNIFICANT CHANGE UP (ref 3.5–5.3)
POTASSIUM SERPL-SCNC: 3.6 MMOL/L — SIGNIFICANT CHANGE UP (ref 3.5–5.3)
PROT SERPL-MCNC: 5.6 G/DL — LOW (ref 6.6–8.7)
RBC # BLD: 3.37 M/UL — LOW (ref 4.6–6.2)
RBC # FLD: 15.4 % — SIGNIFICANT CHANGE UP (ref 11–15.6)
SODIUM SERPL-SCNC: 132 MMOL/L — LOW (ref 135–145)
SPECIMEN SOURCE: SIGNIFICANT CHANGE UP
SPECIMEN SOURCE: SIGNIFICANT CHANGE UP
WBC # BLD: 9.1 K/UL — SIGNIFICANT CHANGE UP (ref 4.8–10.8)
WBC # FLD AUTO: 9.1 K/UL — SIGNIFICANT CHANGE UP (ref 4.8–10.8)

## 2017-05-07 RX ORDER — INSULIN LISPRO 100/ML
VIAL (ML) SUBCUTANEOUS
Qty: 0 | Refills: 0 | Status: DISCONTINUED | OUTPATIENT
Start: 2017-05-07 | End: 2017-05-11

## 2017-05-07 RX ORDER — POTASSIUM PHOSPHATE, MONOBASIC POTASSIUM PHOSPHATE, DIBASIC 236; 224 MG/ML; MG/ML
15 INJECTION, SOLUTION INTRAVENOUS ONCE
Qty: 0 | Refills: 0 | Status: COMPLETED | OUTPATIENT
Start: 2017-05-07 | End: 2017-05-07

## 2017-05-07 RX ORDER — MAGNESIUM SULFATE 500 MG/ML
2 VIAL (ML) INJECTION
Qty: 0 | Refills: 0 | Status: COMPLETED | OUTPATIENT
Start: 2017-05-07 | End: 2017-05-07

## 2017-05-07 RX ORDER — OXYCODONE HYDROCHLORIDE 5 MG/1
1 TABLET ORAL
Qty: 0 | Refills: 0 | COMMUNITY
Start: 2017-05-07

## 2017-05-07 RX ORDER — SODIUM CHLORIDE 9 MG/ML
1000 INJECTION, SOLUTION INTRAVENOUS ONCE
Qty: 0 | Refills: 0 | Status: COMPLETED | OUTPATIENT
Start: 2017-05-07 | End: 2017-05-07

## 2017-05-07 RX ADMIN — PIPERACILLIN AND TAZOBACTAM 25 GRAM(S): 4; .5 INJECTION, POWDER, LYOPHILIZED, FOR SOLUTION INTRAVENOUS at 21:52

## 2017-05-07 RX ADMIN — POTASSIUM PHOSPHATE, MONOBASIC POTASSIUM PHOSPHATE, DIBASIC 62.5 MILLIMOLE(S): 236; 224 INJECTION, SOLUTION INTRAVENOUS at 17:25

## 2017-05-07 RX ADMIN — Medication 12: at 17:26

## 2017-05-07 RX ADMIN — Medication 600 MILLIGRAM(S): at 00:13

## 2017-05-07 RX ADMIN — Medication 8: at 12:57

## 2017-05-07 RX ADMIN — SODIUM CHLORIDE 100 MILLILITER(S): 9 INJECTION INTRAMUSCULAR; INTRAVENOUS; SUBCUTANEOUS at 00:02

## 2017-05-07 RX ADMIN — Medication 63.75 MILLIMOLE(S): at 17:25

## 2017-05-07 RX ADMIN — Medication 600 MILLIGRAM(S): at 00:15

## 2017-05-07 RX ADMIN — SODIUM CHLORIDE 1000 MILLILITER(S): 9 INJECTION, SOLUTION INTRAVENOUS at 02:46

## 2017-05-07 RX ADMIN — Medication 2: at 21:52

## 2017-05-07 RX ADMIN — PIPERACILLIN AND TAZOBACTAM 25 GRAM(S): 4; .5 INJECTION, POWDER, LYOPHILIZED, FOR SOLUTION INTRAVENOUS at 15:47

## 2017-05-07 RX ADMIN — Medication 50 GRAM(S): at 15:50

## 2017-05-07 RX ADMIN — Medication 50 GRAM(S): at 17:25

## 2017-05-07 RX ADMIN — PIPERACILLIN AND TAZOBACTAM 25 GRAM(S): 4; .5 INJECTION, POWDER, LYOPHILIZED, FOR SOLUTION INTRAVENOUS at 05:35

## 2017-05-07 RX ADMIN — PANTOPRAZOLE SODIUM 40 MILLIGRAM(S): 20 TABLET, DELAYED RELEASE ORAL at 05:36

## 2017-05-07 RX ADMIN — Medication 4: at 08:03

## 2017-05-07 NOTE — PROGRESS NOTE ADULT - SUBJECTIVE AND OBJECTIVE BOX
No abd pain, kvng PO  T 102.6 at 11pm  SBP   WBC 14 yest  Blood cx positive - GNR  on Zosyn    Exam  abd soft, NT/ND, PTC open - bile clear    A/P  Cholangitis  Stable  Cont. Zosyn  Cont PTC and сергей tube to SD  Check labs  OOB/IS

## 2017-05-08 DIAGNOSIS — E11.9 TYPE 2 DIABETES MELLITUS WITHOUT COMPLICATIONS: ICD-10-CM

## 2017-05-08 DIAGNOSIS — Z29.9 ENCOUNTER FOR PROPHYLACTIC MEASURES, UNSPECIFIED: ICD-10-CM

## 2017-05-08 DIAGNOSIS — E83.42 HYPOMAGNESEMIA: ICD-10-CM

## 2017-05-08 DIAGNOSIS — D63.0 ANEMIA IN NEOPLASTIC DISEASE: ICD-10-CM

## 2017-05-08 DIAGNOSIS — A41.51 SEPSIS DUE TO ESCHERICHIA COLI [E. COLI]: ICD-10-CM

## 2017-05-08 DIAGNOSIS — K83.0 CHOLANGITIS: ICD-10-CM

## 2017-05-08 DIAGNOSIS — I10 ESSENTIAL (PRIMARY) HYPERTENSION: ICD-10-CM

## 2017-05-08 DIAGNOSIS — E78.5 HYPERLIPIDEMIA, UNSPECIFIED: ICD-10-CM

## 2017-05-08 LAB
-  AMIKACIN: SIGNIFICANT CHANGE UP
-  AMIKACIN: SIGNIFICANT CHANGE UP
-  AMPICILLIN/SULBACTAM: SIGNIFICANT CHANGE UP
-  AMPICILLIN/SULBACTAM: SIGNIFICANT CHANGE UP
-  AMPICILLIN: SIGNIFICANT CHANGE UP
-  AZTREONAM: SIGNIFICANT CHANGE UP
-  AZTREONAM: SIGNIFICANT CHANGE UP
-  CEFAZOLIN: SIGNIFICANT CHANGE UP
-  CEFAZOLIN: SIGNIFICANT CHANGE UP
-  CEFEPIME: SIGNIFICANT CHANGE UP
-  CEFEPIME: SIGNIFICANT CHANGE UP
-  CEFOXITIN: SIGNIFICANT CHANGE UP
-  CEFOXITIN: SIGNIFICANT CHANGE UP
-  CEFTAZIDIME: SIGNIFICANT CHANGE UP
-  CEFTAZIDIME: SIGNIFICANT CHANGE UP
-  CEFTRIAXONE: SIGNIFICANT CHANGE UP
-  CEFTRIAXONE: SIGNIFICANT CHANGE UP
-  CIPROFLOXACIN: SIGNIFICANT CHANGE UP
-  CIPROFLOXACIN: SIGNIFICANT CHANGE UP
-  ERTAPENEM: SIGNIFICANT CHANGE UP
-  ERTAPENEM: SIGNIFICANT CHANGE UP
-  ERYTHROMYCIN: SIGNIFICANT CHANGE UP
-  GENTAMICIN: SIGNIFICANT CHANGE UP
-  GENTAMICIN: SIGNIFICANT CHANGE UP
-  IMIPENEM: SIGNIFICANT CHANGE UP
-  IMIPENEM: SIGNIFICANT CHANGE UP
-  LEVOFLOXACIN: SIGNIFICANT CHANGE UP
-  LEVOFLOXACIN: SIGNIFICANT CHANGE UP
-  MEROPENEM: SIGNIFICANT CHANGE UP
-  MEROPENEM: SIGNIFICANT CHANGE UP
-  PIPERACILLIN/TAZOBACTAM: SIGNIFICANT CHANGE UP
-  PIPERACILLIN/TAZOBACTAM: SIGNIFICANT CHANGE UP
-  TETRACYCLINE: SIGNIFICANT CHANGE UP
-  TOBRAMYCIN: SIGNIFICANT CHANGE UP
-  TOBRAMYCIN: SIGNIFICANT CHANGE UP
-  TRIMETHOPRIM/SULFAMETHOXAZOLE: SIGNIFICANT CHANGE UP
-  TRIMETHOPRIM/SULFAMETHOXAZOLE: SIGNIFICANT CHANGE UP
-  VANCOMYCIN: SIGNIFICANT CHANGE UP
ALBUMIN SERPL ELPH-MCNC: 2.5 G/DL — LOW (ref 3.3–5.2)
ALP SERPL-CCNC: 311 U/L — HIGH (ref 40–120)
ALT FLD-CCNC: 59 U/L — HIGH
ANION GAP SERPL CALC-SCNC: 14 MMOL/L — SIGNIFICANT CHANGE UP (ref 5–17)
AST SERPL-CCNC: 29 U/L — SIGNIFICANT CHANGE UP
BILIRUB SERPL-MCNC: 8.2 MG/DL — HIGH (ref 0.4–2)
BUN SERPL-MCNC: 10 MG/DL — SIGNIFICANT CHANGE UP (ref 8–20)
CALCIUM SERPL-MCNC: 8.3 MG/DL — LOW (ref 8.6–10.2)
CHLORIDE SERPL-SCNC: 98 MMOL/L — SIGNIFICANT CHANGE UP (ref 98–107)
CO2 SERPL-SCNC: 21 MMOL/L — LOW (ref 22–29)
CREAT SERPL-MCNC: 0.33 MG/DL — LOW (ref 0.5–1.3)
CULTURE RESULTS: SIGNIFICANT CHANGE UP
CULTURE RESULTS: SIGNIFICANT CHANGE UP
EOSINOPHIL # BLD AUTO: 0 K/UL — SIGNIFICANT CHANGE UP (ref 0–0.5)
EOSINOPHIL NFR BLD AUTO: 0.7 % — SIGNIFICANT CHANGE UP (ref 0–6)
GLUCOSE SERPL-MCNC: 310 MG/DL — HIGH (ref 70–115)
HCT VFR BLD CALC: 24.6 % — LOW (ref 42–52)
HGB BLD-MCNC: 8.6 G/DL — LOW (ref 14–18)
LYMPHOCYTES # BLD AUTO: 1 K/UL — SIGNIFICANT CHANGE UP (ref 1–4.8)
LYMPHOCYTES # BLD AUTO: 15.4 % — LOW (ref 20–55)
MAGNESIUM SERPL-MCNC: 1.7 MG/DL — LOW (ref 1.8–2.5)
MCHC RBC-ENTMCNC: 27.5 PG — SIGNIFICANT CHANGE UP (ref 27–31)
MCHC RBC-ENTMCNC: 35 G/DL — SIGNIFICANT CHANGE UP (ref 32–36)
MCV RBC AUTO: 78.6 FL — LOW (ref 80–94)
METHOD TYPE: SIGNIFICANT CHANGE UP
MONOCYTES # BLD AUTO: 0.6 K/UL — SIGNIFICANT CHANGE UP (ref 0–0.8)
MONOCYTES NFR BLD AUTO: 9.2 % — SIGNIFICANT CHANGE UP (ref 3–10)
NEUTROPHILS # BLD AUTO: 5 K/UL — SIGNIFICANT CHANGE UP (ref 1.8–8)
NEUTROPHILS NFR BLD AUTO: 74.4 % — HIGH (ref 37–73)
ORGANISM # SPEC MICROSCOPIC CNT: SIGNIFICANT CHANGE UP
PHOSPHATE SERPL-MCNC: 2.3 MG/DL — LOW (ref 2.4–4.7)
PLATELET # BLD AUTO: 125 K/UL — LOW (ref 150–400)
POTASSIUM SERPL-MCNC: 3.5 MMOL/L — SIGNIFICANT CHANGE UP (ref 3.5–5.3)
POTASSIUM SERPL-SCNC: 3.5 MMOL/L — SIGNIFICANT CHANGE UP (ref 3.5–5.3)
PROT SERPL-MCNC: 5.2 G/DL — LOW (ref 6.6–8.7)
RBC # BLD: 3.13 M/UL — LOW (ref 4.6–6.2)
RBC # FLD: 15.4 % — SIGNIFICANT CHANGE UP (ref 11–15.6)
SODIUM SERPL-SCNC: 133 MMOL/L — LOW (ref 135–145)
SPECIMEN SOURCE: SIGNIFICANT CHANGE UP
SPECIMEN SOURCE: SIGNIFICANT CHANGE UP
WBC # BLD: 6.8 K/UL — SIGNIFICANT CHANGE UP (ref 4.8–10.8)
WBC # FLD AUTO: 6.8 K/UL — SIGNIFICANT CHANGE UP (ref 4.8–10.8)

## 2017-05-08 PROCEDURE — 99222 1ST HOSP IP/OBS MODERATE 55: CPT

## 2017-05-08 PROCEDURE — 99232 SBSQ HOSP IP/OBS MODERATE 35: CPT | Mod: 24

## 2017-05-08 RX ORDER — SACCHAROMYCES BOULARDII 250 MG
250 POWDER IN PACKET (EA) ORAL
Qty: 0 | Refills: 0 | Status: DISCONTINUED | OUTPATIENT
Start: 2017-05-08 | End: 2017-05-11

## 2017-05-08 RX ORDER — MAGNESIUM SULFATE 500 MG/ML
1 VIAL (ML) INJECTION ONCE
Qty: 0 | Refills: 0 | Status: COMPLETED | OUTPATIENT
Start: 2017-05-08 | End: 2017-05-08

## 2017-05-08 RX ORDER — INSULIN GLARGINE 100 [IU]/ML
20 INJECTION, SOLUTION SUBCUTANEOUS AT BEDTIME
Qty: 0 | Refills: 0 | Status: DISCONTINUED | OUTPATIENT
Start: 2017-05-08 | End: 2017-05-09

## 2017-05-08 RX ORDER — ENOXAPARIN SODIUM 100 MG/ML
40 INJECTION SUBCUTANEOUS AT BEDTIME
Qty: 0 | Refills: 0 | Status: DISCONTINUED | OUTPATIENT
Start: 2017-05-08 | End: 2017-05-11

## 2017-05-08 RX ORDER — MAGNESIUM SULFATE 500 MG/ML
2 VIAL (ML) INJECTION ONCE
Qty: 0 | Refills: 0 | Status: COMPLETED | OUTPATIENT
Start: 2017-05-08 | End: 2017-05-08

## 2017-05-08 RX ORDER — POTASSIUM PHOSPHATE, MONOBASIC POTASSIUM PHOSPHATE, DIBASIC 236; 224 MG/ML; MG/ML
15 INJECTION, SOLUTION INTRAVENOUS ONCE
Qty: 0 | Refills: 0 | Status: COMPLETED | OUTPATIENT
Start: 2017-05-08 | End: 2017-05-08

## 2017-05-08 RX ORDER — AMLODIPINE BESYLATE 2.5 MG/1
5 TABLET ORAL DAILY
Qty: 0 | Refills: 0 | Status: DISCONTINUED | OUTPATIENT
Start: 2017-05-08 | End: 2017-05-11

## 2017-05-08 RX ADMIN — Medication 100 GRAM(S): at 11:03

## 2017-05-08 RX ADMIN — POTASSIUM PHOSPHATE, MONOBASIC POTASSIUM PHOSPHATE, DIBASIC 62.5 MILLIMOLE(S): 236; 224 INJECTION, SOLUTION INTRAVENOUS at 12:33

## 2017-05-08 RX ADMIN — Medication 4: at 23:26

## 2017-05-08 RX ADMIN — PIPERACILLIN AND TAZOBACTAM 25 GRAM(S): 4; .5 INJECTION, POWDER, LYOPHILIZED, FOR SOLUTION INTRAVENOUS at 23:55

## 2017-05-08 RX ADMIN — PIPERACILLIN AND TAZOBACTAM 25 GRAM(S): 4; .5 INJECTION, POWDER, LYOPHILIZED, FOR SOLUTION INTRAVENOUS at 16:53

## 2017-05-08 RX ADMIN — AMLODIPINE BESYLATE 5 MILLIGRAM(S): 2.5 TABLET ORAL at 17:00

## 2017-05-08 RX ADMIN — PIPERACILLIN AND TAZOBACTAM 25 GRAM(S): 4; .5 INJECTION, POWDER, LYOPHILIZED, FOR SOLUTION INTRAVENOUS at 06:44

## 2017-05-08 RX ADMIN — Medication 50 GRAM(S): at 20:49

## 2017-05-08 RX ADMIN — Medication 9: at 09:32

## 2017-05-08 RX ADMIN — Medication 250 MILLIGRAM(S): at 17:39

## 2017-05-08 RX ADMIN — Medication 9: at 17:39

## 2017-05-08 RX ADMIN — INSULIN GLARGINE 20 UNIT(S): 100 INJECTION, SOLUTION SUBCUTANEOUS at 23:48

## 2017-05-08 RX ADMIN — PANTOPRAZOLE SODIUM 40 MILLIGRAM(S): 20 TABLET, DELAYED RELEASE ORAL at 06:47

## 2017-05-08 RX ADMIN — ENOXAPARIN SODIUM 40 MILLIGRAM(S): 100 INJECTION SUBCUTANEOUS at 23:53

## 2017-05-08 RX ADMIN — Medication 9: at 12:30
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
## 2017-05-08 NOTE — CONSULT NOTE ADULT - SUBJECTIVE AND OBJECTIVE BOX
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  insulin lispro (HumaLOG) corrective regimen sliding scale  SubCutaneous three times a day before meals  potassium phosphate IVPB 15milliMole(s) IV Intermittent once    MEDICATIONS  (PRN):  dextrose Gel 1Dose(s) Oral once PRN Blood Glucose LESS THAN 70 milliGRAM(s)/deciliter  glucagon  Injectable 1milliGRAM(s) IntraMuscular once PRN Glucose LESS THAN 70 milligrams/deciliter  oxyCODONE IR 5milliGRAM(s) Oral every 3 hours PRN Moderate Pain (4 - 6)  oxyCODONE IR 10milliGRAM(s) Oral every 3 hours PRN Severe Pain (7 - 10)      Vital Signs Last 24 Hrs  T(C): 36.7, Max: 37 (05-08 @ 00:02)  T(F): 98.1, Max: 98.6 (05-08 @ 00:02)  HR: 85 (74 - 104)  BP: 123/68 (84/58 - 123/68)  BP(mean): --  RR: 18 (17 - 18)  SpO2: 97% (97% - 99%)    PHYSICAL EXAM:    GENERAL: NAD, well-groomed, well-developed  HEAD:  Atraumatic, Normocephalic  EYES: EOMI, PERRLA, conjunctiva and sclera clear  NECK: Supple, No JVD, Normal thyroid  NERVOUS SYSTEM:  Alert & Oriented X3, Good concentration; Motor Strength 5/5 B/L upper and lower extremities; DTRs 2+ intact and symmetric  CHEST/LUNG: CTA  b/l,  no rales, rhonchi, wheezing, or rubs  HEART: Regular rate and rhythm; No murmurs, rubs, or gallops  ABDOMEN: Soft, Nontender, Nondistended; Bowel sounds present , mid abdominal scar + , drains x2 +   EXTREMITIES:  2+ Peripheral Pulses, No clubbing, cyanosis, or edema ,   LYMPH: No lymphadenopathy noted  SKIN: No rashes or lesions    LABS:                        8.6    6.8   )-----------( 125      ( 08 May 2017 06:50 )             24.6     05-08    133<L>  |  98  |  10.0  ----------------------------<  310<H>  3.5   |  21.0<L>  |  0.33<L>    Ca    8.3<L>      08 May 2017 06:50  Phos  2.3     05-08  Mg     1.7     -08    TPro  5.2<L>  /  Alb  2.5<L>  /  TBili  8.2<H>  /  DBili  x   /  AST  29  /  ALT  59<H>  /  AlkPhos  311<H>              RADIOLOGY & ADDITIONAL STUDIES:       EXAM:  CHEST SINGLE VIEW FRONTAL                          PROCEDURE DATE:  2017        INTERPRETATION:  History: Preprocedure clearance    Technique:  AP portable    Comparisons:  Chest x-ray dated 2014    Findings: The lungs are clear. [id number] catheter in the SVC. There   are no infiltrates, congestion or pleural effusions. The pulmonary   vasculature and aorta are normal for age. Heart size is unremarkable. The   thorax is normal for age.    Impression: No acute pulmonary disease. No significant change in the   appearance of the heart or lungs          LAVERNE OSUNA M.D., ATTENDING RADIOLOGIST  This document has been electronically signed. May  2 2017  4:13PM         EXAM:  US ABDOMEN LIMITED                          PROCEDURE DATE:  2017        INTERPRETATION:  History:Cholecystostomy tube. Biliary obstruction.    Technique: Grayscale ultrasound of the right upper quadrant    Comparison: None     Findings: The gallbladder is collapsed around the cholecystostomy   catheter. There is no fluid around the gallbladder. The gallbladder is   not distended.    There is common bile duct is prominent measuring 1.2 cm. There is mild   intrahepatic bile duct dilatation.    In the region of the pancreatic head/proximal body is a 2.3 x 1.0 cm   hypoechoic mass, which corresponds to the finding seen on CT from       Right kidney is normal in size measuring 10.5 cm without hydronephrosis    IMPRESSION:     CHOLECYSTOSTOMY CATHETER IS IN GOOD POSITION WITHIN THE GALLBLADDER.   THERE IS NO EVIDENCE OF CATHETER DYSFUNCTION AS THE GALLBLADDER REMAINS   COLLAPSED.    COMMON BILE DUCT IS PROMINENT MEASURING UP TO 1.2 CM. THERE IS ALSO MILD   INTRAHEPATIC BILE DUCT DILATATION      Culture - Blood in AM (17 @ 06:30)    Specimen Source: .Blood Blood    Culture Results:   Growth in aerobic and anaerobic bottles: Gram Negative Rods  Aerobic Bottle: 7:45 Hours to positivity  Anaerobic Bottle: 8:05 Hours to positivity  .  TYPE: (C=Critical, N=Notification, A=Abnormal) c  TESTS:  _   DATE/TIME CALLED: _ 2017 15:15:11  CALLED TO: Janice sácnhez rn  READ BACK (2 Patient Identifiers)(Y/N): _ y  READ BACK VALUES (Y/N): _ y  CALLED BY: Janice acosta    Culture - Body Fluid with Gram Stain (17 @ 13:59)    -  Ampicillin/Sulbactam: I 16/8    -  Cefepime: S <=4    -  Ceftazidime: S <=1    -  Ceftriaxone: S <=1    -  Ertapenem: S <=1    -  Imipenem: S <=1    -  Trimethoprim/Sulfamethoxazole: R >2/38    -  Ciprofloxacin: R >2    -  Gentamicin: I 8    -  Piperacillin/Tazobactam: S <=16    -  Cefazolin: S <=8    -  Levofloxacin: R >4    -  Meropenem: S <=1    -  Tobramycin: S <=4    -  Amikacin: S <=16    -  Ampicillin: R >16    -  Aztreonam: S <=4    -  Cefoxitin: S <=8    Gram Stain:   No White blood cells  Few Gram Negative Rods    Specimen Source: .Body Fluid Bile Fluid    Culture Results:   Numerous Gram Negative Rods  Numerous Streptococcus species  Culture in progress    Organism Identification: Escherichia coli    Organism: Escherichia coli    Method Type: AVTAR

## 2017-05-08 NOTE — CONSULT NOTE ADULT - PROBLEM SELECTOR RECOMMENDATION 9
on Zosyn , will get 2 sets of blood cultures , add Florastor , consider ID consult Lovenox if OK with colorectal team

## 2017-05-08 NOTE — CONSULT NOTE ADULT - PROBLEM SELECTOR RECOMMENDATION 10
continue iv abx , consider ID consult , add Florastor continue iv abx , consider ID consult , add Florastor.   Will get 2 sets blood cultures

## 2017-05-08 NOTE — CONSULT NOTE ADULT - ASSESSMENT
57M with unresectable duodenal/pancreatic mass scheduled to start chemo presented to his PMD  with jaundice.  He was found to have an elevated bilirubin at 12 reportedly despite having cholecystostomy in place with normal output.  Patient underwent abdominal US which revealed dilation of the CBD as well as intrahepatic bile duct. (02 May 2017 20:26). S/p Cholangiogram, percutaneous transhepatic, with biliary drainage  by IR on 5/3 . Spiked fever and found to be septic secondary to bacteriemia ( E. Coli ) , started on iv abx .  I am called  today by colorectal team  to medically manage patient.

## 2017-05-09 LAB
-  CEFTRIAXONE: SIGNIFICANT CHANGE UP
-  CLINDAMYCIN: SIGNIFICANT CHANGE UP
-  ERYTHROMYCIN: SIGNIFICANT CHANGE UP
-  PENICILLIN: SIGNIFICANT CHANGE UP
-  VANCOMYCIN: SIGNIFICANT CHANGE UP
ALBUMIN SERPL ELPH-MCNC: 2.5 G/DL — LOW (ref 3.3–5.2)
ALP SERPL-CCNC: 288 U/L — HIGH (ref 40–120)
ALT FLD-CCNC: 58 U/L — HIGH
ANION GAP SERPL CALC-SCNC: 10 MMOL/L — SIGNIFICANT CHANGE UP (ref 5–17)
AST SERPL-CCNC: 32 U/L — SIGNIFICANT CHANGE UP
BILIRUB DIRECT SERPL-MCNC: 5.2 MG/DL — HIGH (ref 0–0.3)
BILIRUB INDIRECT FLD-MCNC: 2 MG/DL — HIGH (ref 0.2–1)
BILIRUB SERPL-MCNC: 7.2 MG/DL — HIGH (ref 0.4–2)
BUN SERPL-MCNC: 7 MG/DL — LOW (ref 8–20)
CALCIUM SERPL-MCNC: 8.1 MG/DL — LOW (ref 8.6–10.2)
CHLORIDE SERPL-SCNC: 99 MMOL/L — SIGNIFICANT CHANGE UP (ref 98–107)
CO2 SERPL-SCNC: 24 MMOL/L — SIGNIFICANT CHANGE UP (ref 22–29)
CREAT SERPL-MCNC: 0.28 MG/DL — LOW (ref 0.5–1.3)
GLUCOSE SERPL-MCNC: 266 MG/DL — HIGH (ref 70–115)
HCT VFR BLD CALC: 25.6 % — LOW (ref 42–52)
HGB BLD-MCNC: 9.1 G/DL — LOW (ref 14–18)
MAGNESIUM SERPL-MCNC: 1.8 MG/DL — SIGNIFICANT CHANGE UP (ref 1.8–2.5)
MCHC RBC-ENTMCNC: 27.5 PG — SIGNIFICANT CHANGE UP (ref 27–31)
MCHC RBC-ENTMCNC: 35.5 G/DL — SIGNIFICANT CHANGE UP (ref 32–36)
MCV RBC AUTO: 77.3 FL — LOW (ref 80–94)
METHOD TYPE: SIGNIFICANT CHANGE UP
PHOSPHATE SERPL-MCNC: 2.1 MG/DL — LOW (ref 2.4–4.7)
PLATELET # BLD AUTO: 184 K/UL — SIGNIFICANT CHANGE UP (ref 150–400)
POTASSIUM SERPL-MCNC: 3.4 MMOL/L — LOW (ref 3.5–5.3)
POTASSIUM SERPL-SCNC: 3.4 MMOL/L — LOW (ref 3.5–5.3)
PROT SERPL-MCNC: 5.4 G/DL — LOW (ref 6.6–8.7)
RBC # BLD: 3.31 M/UL — LOW (ref 4.6–6.2)
RBC # FLD: 15.5 % — SIGNIFICANT CHANGE UP (ref 11–15.6)
SODIUM SERPL-SCNC: 133 MMOL/L — LOW (ref 135–145)
WBC # BLD: 6.5 K/UL — SIGNIFICANT CHANGE UP (ref 4.8–10.8)
WBC # FLD AUTO: 6.5 K/UL — SIGNIFICANT CHANGE UP (ref 4.8–10.8)

## 2017-05-09 PROCEDURE — 99233 SBSQ HOSP IP/OBS HIGH 50: CPT

## 2017-05-09 PROCEDURE — 99232 SBSQ HOSP IP/OBS MODERATE 35: CPT | Mod: 24

## 2017-05-09 PROCEDURE — 99232 SBSQ HOSP IP/OBS MODERATE 35: CPT

## 2017-05-09 RX ORDER — INSULIN GLARGINE 100 [IU]/ML
30 INJECTION, SOLUTION SUBCUTANEOUS AT BEDTIME
Qty: 0 | Refills: 0 | Status: DISCONTINUED | OUTPATIENT
Start: 2017-05-09 | End: 2017-05-10

## 2017-05-09 RX ORDER — POTASSIUM PHOSPHATE, MONOBASIC POTASSIUM PHOSPHATE, DIBASIC 236; 224 MG/ML; MG/ML
15 INJECTION, SOLUTION INTRAVENOUS ONCE
Qty: 0 | Refills: 0 | Status: COMPLETED | OUTPATIENT
Start: 2017-05-09 | End: 2017-05-09

## 2017-05-09 RX ORDER — POTASSIUM CHLORIDE 20 MEQ
20 PACKET (EA) ORAL
Qty: 0 | Refills: 0 | Status: COMPLETED | OUTPATIENT
Start: 2017-05-09 | End: 2017-05-11

## 2017-05-09 RX ADMIN — Medication 250 MILLIGRAM(S): at 05:44

## 2017-05-09 RX ADMIN — INSULIN GLARGINE 30 UNIT(S): 100 INJECTION, SOLUTION SUBCUTANEOUS at 21:56

## 2017-05-09 RX ADMIN — Medication 250 MILLIGRAM(S): at 18:03

## 2017-05-09 RX ADMIN — PIPERACILLIN AND TAZOBACTAM 25 GRAM(S): 4; .5 INJECTION, POWDER, LYOPHILIZED, FOR SOLUTION INTRAVENOUS at 05:53

## 2017-05-09 RX ADMIN — Medication 9: at 10:18

## 2017-05-09 RX ADMIN — PIPERACILLIN AND TAZOBACTAM 25 GRAM(S): 4; .5 INJECTION, POWDER, LYOPHILIZED, FOR SOLUTION INTRAVENOUS at 21:56

## 2017-05-09 RX ADMIN — POTASSIUM PHOSPHATE, MONOBASIC POTASSIUM PHOSPHATE, DIBASIC 62.5 MILLIMOLE(S): 236; 224 INJECTION, SOLUTION INTRAVENOUS at 11:26

## 2017-05-09 RX ADMIN — ENOXAPARIN SODIUM 40 MILLIGRAM(S): 100 INJECTION SUBCUTANEOUS at 21:56

## 2017-05-09 RX ADMIN — PIPERACILLIN AND TAZOBACTAM 25 GRAM(S): 4; .5 INJECTION, POWDER, LYOPHILIZED, FOR SOLUTION INTRAVENOUS at 13:57

## 2017-05-09 RX ADMIN — SODIUM CHLORIDE 100 MILLILITER(S): 9 INJECTION INTRAMUSCULAR; INTRAVENOUS; SUBCUTANEOUS at 07:22

## 2017-05-09 RX ADMIN — PANTOPRAZOLE SODIUM 40 MILLIGRAM(S): 20 TABLET, DELAYED RELEASE ORAL at 05:44

## 2017-05-09 RX ADMIN — AMLODIPINE BESYLATE 5 MILLIGRAM(S): 2.5 TABLET ORAL at 05:44

## 2017-05-09 RX ADMIN — Medication 20 MILLIEQUIVALENT(S): at 18:03

## 2017-05-09 RX ADMIN — Medication 6: at 13:49

## 2017-05-09 NOTE — PROGRESS NOTE ADULT - ASSESSMENT
57M with unresectable duodenal/pancreatic mass scheduled to start chemo presented to his PMD  with jaundice.  He was found to have an elevated bilirubin at 12 reportedly despite having cholecystostomy in place with normal output.  Patient underwent abdominal US which revealed dilation of the CBD as well as intrahepatic bile duct. (02 May 2017 20:26). S/p Cholangiogram, percutaneous transhepatic, with biliary drainage  by IR on 5/3 . Spiked fever and found to be septic secondary to  bacteriemia ( E. Coli ) , started on iv abx .      Problem/Recommendation - 1:  Problem: Sepsis due to Escherichia coli. Recommendation: on Zosyn , will get 2 sets of blood cultures , add Florastor , consider ID consult.    Problem/Recommendation - 2:  ·  Problem: Duodenal adenocarcinoma.  Recommendation: follow up with oncologist.     Problem/Recommendation - 3:  ·  Problem: Malignant neoplasm of pancreas, unspecified location of malignancy.  Recommendation: follow up with oncologist for planned chemo tx.     Problem/Recommendation - 4:  ·  Problem: Essential hypertension.  Recommendation: continue home meds with parameters.     Problem/Recommendation - 5:  ·  Problem: Hyperlipidemia, unspecified hyperlipidemia type.  Recommendation: not on meds.     Problem/Recommendation - 6:  Problem: Anemia in neoplastic disease. Recommendation: asymptomatic , follow up CBC.    Problem/Recommendation - 7:  Problem: DM type 2 (diabetes mellitus, type 2). Recommendation: Diet , ISSC , accu check , restart Lantus at 20 units , and titrate as needed.    Problem/Recommendation - 8:  Problem: Hypomagnesemia. Recommendation: will supplement.    Problem/Recommendation - 9:  Problem: Prophylactic measure. Recommendation: Lovenox if OK with colorectal team.    Problem/Recommendation - 10:  Problem: Cholangitis. Recommendation: continue iv abx , consider ID consult , add Florastor.   Will get 2 sets blood cultures. 57M with unresectable duodenal/pancreatic mass scheduled to start chemo presented to his PMD  with jaundice.  He was found to have an elevated bilirubin at 12 reportedly despite having cholecystostomy in place with normal output.  Patient underwent abdominal US which revealed dilation of the CBD as well as intrahepatic bile duct. (02 May 2017 20:26). S/p Cholangiogram, percutaneous transhepatic, with biliary drainage  by IR on 5/3 . Spiked fever and found to be septic secondary to  bacteriemia ( E. Coli ) , started on iv abx .      Problem/Recommendation - 1:  Problem: Sepsis due to Escherichia coli. Recommendation: on Zosyn , follow  blood cultures , Florastor added  ,  ID consult note noted and appreciated .     Problem/Recommendation - 2:  ·  Problem: Duodenal adenocarcinoma.  Recommendation: follow up with oncologist.     Problem/Recommendation - 3:  ·  Problem: Malignant neoplasm of pancreas, unspecified location of malignancy.  Recommendation: follow up with oncologist for planned chemo tx.     Problem/Recommendation - 4:  ·  Problem: Essential hypertension.  Recommendation: continue home meds with parameters.     Problem/Recommendation - 5:  ·  Problem: Hyperlipidemia, unspecified hyperlipidemia type.  Recommendation: not on meds.     Problem/Recommendation - 6:  Problem: Anemia in neoplastic disease. Recommendation: asymptomatic , stable     Problem/Recommendation - 7:  Problem: DM type 2 (diabetes mellitus, type 2). Recommendation: Diet , ISSC , accu check , Lantus increased to 30 units , and titrate as needed.    Problem/Recommendation - 8:  Problem: Hypomagnesemia. Recommendation: supplemented .    Problem/Recommendation - 9:  Problem: Prophylactic measure. Recommendation: Lovenox if OK with colorectal team.    Problem/Recommendation - 10:  Problem: Cholangitis. Recommendation: continue iv abx , ID follow up , follow up cultures .    Problem / Plan - 11: Hypokalemia - will supplement    Problem / Plan - 12: Hypophosphatemia - will supplement     Problem / Plan - Thrombocytopenia - resolved

## 2017-05-09 NOTE — PROGRESS NOTE ADULT - ASSESSMENT
57M with unresectable duodenal/pancreatic mass scheduled to start chemo this Thursday presented to his PMD today with jaundice.  He was found to have an elevated bilirubin at 12 reportedly despite having cholecystostomy in place with normal output.  Patient underwent abdominal US which revealed dilation of the CBD as well as intrahepatic bile duct.   PT WITH FEVERS AND WITH ECOLI    BACTEREMIA S/P CHOLEYSTOTOMY   FLUID CX  ECOLI ENTEROCOCCUS  CONTNUE ZOSYN AWAIT REPEAT CX PENDI REPEAT BLOOD CX NEG MAY BE ABLETO CHANGE TO ORAL REGIMEN

## 2017-05-09 NOTE — PROGRESS NOTE ADULT - SUBJECTIVE AND OBJECTIVE BOX
INTERVAL HPI/OVERNIGHT EVENTS: Pt examined at the bedside and with the  phone,  #[id number].  Pt is resting comfortably and whos only complaint was that his belly felt distended last night but was relieved when he had a bowel movement.  Pain is adequately controlled at this time and is tolerating his diet.  Blood cx are still positive for E. coli.      STATUS POST: PTC    POST OPERATIVE DAY #:6    SUBJECTIVE:  Flatus: [x ] YES [ ] NO             Bowel Movement: [x ] YES [ ] NO  Pain (0-10):            Pain Control Adequate: [x ] YES [ ] NO  Nausea: [ ] YES [x] NO            Vomiting: [ ] YES [x ] NO  Diarrhea: [ ] YES [ x] NO         Constipation: [ ] YES [x ] NO     Chest Pain: [ ] YES [x ] NO    SOB:  [ ] YES [x ] NO    MEDICATIONS  (STANDING):  pantoprazole    Tablet 40milliGRAM(s) Oral before breakfast  insulin lispro (HumaLOG) corrective regimen sliding scale  SubCutaneous at bedtime  dextrose 5%. 1000milliLiter(s) IV Continuous <Continuous>  dextrose 50% Injectable 12.5Gram(s) IV Push once  dextrose 50% Injectable 25Gram(s) IV Push once  dextrose 50% Injectable 25Gram(s) IV Push once  piperacillin/tazobactam IVPB. 3.375Gram(s) IV Intermittent every 8 hours  sodium chloride 0.9%. 1000milliLiter(s) IV Continuous <Continuous>  insulin lispro (HumaLOG) corrective regimen sliding scale  SubCutaneous three times a day before meals  saccharomyces boulardii 250milliGRAM(s) Oral two times a day  insulin glargine Injectable (LANTUS) 20Unit(s) SubCutaneous at bedtime  enoxaparin Injectable 40milliGRAM(s) SubCutaneous at bedtime  amLODIPine   Tablet 5milliGRAM(s) Oral daily    MEDICATIONS  (PRN):  dextrose Gel 1Dose(s) Oral once PRN Blood Glucose LESS THAN 70 milliGRAM(s)/deciliter  glucagon  Injectable 1milliGRAM(s) IntraMuscular once PRN Glucose LESS THAN 70 milligrams/deciliter  oxyCODONE IR 5milliGRAM(s) Oral every 3 hours PRN Moderate Pain (4 - 6)  oxyCODONE IR 10milliGRAM(s) Oral every 3 hours PRN Severe Pain (7 - 10)      Vital Signs Last 24 Hrs  T(C): 37, Max: 37.4 (05-09 @ 05:20)  T(F): 98.6, Max: 99.3 (05-09 @ 05:20)  HR: 74 (74 - 81)  BP: 112/66 (112/66 - 135/80)  RR: 16 (16 - 19)  SpO2: 97% (96% - 99%)    PHYSICAL EXAM:    Constitutional: NAD, resting comfortably    Eyes:PERRLA, EOM intact    Respiratory: CTA b/l, no WRR    Cardiovascular: S1S2, RRR    Gastrointestinal: abd soft NT/ND.  Midline incision is clean dry and intact.  +BSx4.  Biliary drain still draining 200cc of bile.  FREDI drain with <5cc of red blood present.      Genitourinary: voiding independently     Neurological: AOx3    Skin: warm, dry, jaundice, intact     I&O's Detail    I & Os for current day (as of 09 May 2017 10:00)  =============================================  IN:    Oral Fluid: 240 ml    Total IN: 240 ml  ---------------------------------------------  OUT:    Drain: 150 ml    Bulb: 55 ml    Total OUT: 205 ml  ---------------------------------------------  Total NET: 35 ml      LABS:                        9.1    6.5   )-----------( 184      ( 09 May 2017 05:55 )             25.6     05-09    133<L>  |  99  |  7.0<L>  ----------------------------<  266<H>  3.4<L>   |  24.0  |  0.28<L>    Ca    8.1<L>      09 May 2017 05:55  Phos  2.1     05-09  Mg     1.8     05-09    TPro  5.2<L>  /  Alb  2.5<L>  /  TBili  8.2<H>  /  DBili  x   /  AST  29  /  ALT  59<H>  /  AlkPhos  311<H>  05-08        RADIOLOGY & ADDITIONAL STUDIES:  Blood culture 5/7: Growth in aerobic and anaerobic bottles: Escherichia coli  Aerobic Bottle: 7:45 Hours to positivity  Anaerobic Bottle: 8:05 Hours to positivity
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
## 2017-05-09 NOTE — PROVIDER CONTACT NOTE (OTHER) - BACKGROUND
57M with unresectable duodenal/pancreatic mass scheduled to start chemo once bilirubin has normalized and pt is free from infection

## 2017-05-09 NOTE — PROGRESS NOTE ADULT - SUBJECTIVE AND OBJECTIVE BOX
KAIT VARGHESE is a 57y Male with HPI:    57M with unresectable duodenal/pancreatic mass scheduled to start chemo this Thursday presented to his PMD today with jaundice.  He was found to have an elevated bilirubin at 12 reportedly despite having cholecystostomy in place with normal output.  Patient underwent abdominal US which revealed dilation of the CBD as well as intrahepatic bile duct.   PT WITH FEVERS AND WITH ECOLI BACTEREMIA S/P CHOLEYSTOTOMY   FLUID CX ECOL ENTEROCOCCUS  ON ZOSYN AFEBRILE    Allergies:  No Known Allergies      Medications:  pantoprazole    Tablet 40milliGRAM(s) Oral before breakfast  insulin lispro (HumaLOG) corrective regimen sliding scale  SubCutaneous at bedtime  dextrose 5%. 1000milliLiter(s) IV Continuous <Continuous>  dextrose Gel 1Dose(s) Oral once PRN  dextrose 50% Injectable 12.5Gram(s) IV Push once  dextrose 50% Injectable 25Gram(s) IV Push once  dextrose 50% Injectable 25Gram(s) IV Push once  glucagon  Injectable 1milliGRAM(s) IntraMuscular once PRN  piperacillin/tazobactam IVPB. 3.375Gram(s) IV Intermittent every 8 hours  oxyCODONE IR 5milliGRAM(s) Oral every 3 hours PRN  oxyCODONE IR 10milliGRAM(s) Oral every 3 hours PRN  sodium chloride 0.9%. 1000milliLiter(s) IV Continuous <Continuous>  insulin lispro (HumaLOG) corrective regimen sliding scale  SubCutaneous three times a day before meals  saccharomyces boulardii 250milliGRAM(s) Oral two times a day  enoxaparin Injectable 40milliGRAM(s) SubCutaneous at bedtime  amLODIPine   Tablet 5milliGRAM(s) Oral daily  insulin glargine Injectable (LANTUS) 30Unit(s) SubCutaneous at bedtime  potassium phosphate IVPB 15milliMole(s) IV Intermittent once  potassium chloride    Tablet ER 20milliEquivalent(s) Oral two times a day      ANTIBIOTICS:    ZOSYN     Review of Systems: - Negative except as mentioned above     Physical Exam:  ICU Vital Signs Last 24 Hrs  T(C): 37, Max: 37.4 (05-09 @ 05:20)  T(F): 98.6, Max: 99.3 (05-09 @ 05:20)  HR: 74 (74 - 81)  BP: 112/66 (112/66 - 135/80)  BP(mean): --  ABP: --  ABP(mean): --  RR: 16 (16 - 19)  SpO2: 97% (96% - 99%)    GEN: NAD, pleasant NO ICTERUS  HEENT: normocephalic and atraumatic. EOMI. LORRI...  NECK: Supple. No carotid bruits.  No lymphadenopathy or thyromegaly.  LUNGS: Clear to auscultation.  HEART: Regular rate and rhythm without murmur.  ABDOMEN: Soft, nontender, and nondistended.  Positive bowel sounds.  No hepatosplenomegaly was noted.  NO REBOUND NO GUARDING  EXTREMITIES: Without any cyanosis, clubbing, rash, lesions or edema.  NEUROLOGIC: Cranial nerves II through XII are grossly intact.    SKIN: No ulceration or induration present.      Labs:  05-09    133<L>  |  99  |  7.0<L>  ----------------------------<  266<H>  3.4<L>   |  24.0  |  0.28<L>    Ca    8.1<L>      09 May 2017 05:55  Phos  2.1     05-09  Mg     1.8     05-09    TPro  5.2<L>  /  Alb  2.5<L>  /  TBili  8.2<H>  /  DBili  x   /  AST  29  /  ALT  59<H>  /  AlkPhos  311<H>  05-08                          9.1    6.5   )-----------( 184      ( 09 May 2017 05:55 )             25.6           LIVER FUNCTIONS - ( 08 May 2017 06:50 )  Alb: 2.5 g/dL / Pro: 5.2 g/dL / ALK PHOS: 311 U/L / ALT: 59 U/L / AST: 29 U/L / GGT: x               CAPILLARY BLOOD GLUCOSE  312 (08 May 2017 23:15)  275 (08 May 2017 17:11)  290 (08 May 2017 13:00)        RECENT CULTURES:  05-07 .Blood Blood Escherichia coli XXXX   Growth in aerobic and anaerobic bottles: Escherichia coli  Aerobic Bottle: 7:45 Hours to positivity  Anaerobic Bottle: 8:05 Hours to positivity  .  TYPE: (C=Critical, N=Notification, A=Abnormal) c  TESTS:  _ gs  DATE/TIME CALLED: _ 05/07/2017 15:15:    05-06 .Body Fluid Bile Fluid Escherichia coli  Enterococcus faecalis   No White blood cells  Few Gram Negative Rods   Numerous Escherichia coli  Numerous Enterococcus faecalis  Numerous Alpha hemolytic strep Identification and susceptibility to  follow.  Culture in progress    05-06 .Blood Blood-Venous Escherichia coli XXXX   Growth in aerobic and anaerobic bottles: Escherichia coli  Anaerobic Bottle: 7:44 Hours to positivity  Aerobic Bottle: 8:35 Hours to positivity  .  TYPE: (C=Critical, N=Notification, A=Abnormal) C  TESTS:  _ Bld   DATE/TIME CALLED: _ 05/06/2017 20 05-02 .Blood Blood-Peripheral XXXX XXXX   No growth at 5 days.    05-02 .Blood Blood-Peripheral XXXX XXXX   No growth at 5 days.

## 2017-05-09 NOTE — PROVIDER CONTACT NOTE (OTHER) - SITUATION
PT WITH FEVERS AND WITH ECOLI    BACTEREMIA S/P CHOLEYSTOTOMY  FLUID CX  ECOLI ENTEROCOCCUS  CONTNUE ZOSYN AWAIT REPEAT CX PENDI REPEAT BLOOD CX NEG MAY BE ABLETO CHANGE TO ORAL REGIMEN

## 2017-05-09 NOTE — PROGRESS NOTE ADULT - ASSESSMENT
57M s/p PTC c PMH pancreatic/duodenal carcinoma   Neuro: Pain control as prescribed   Cardio: Monitor Vital signs cont norvasc  Pulm: Incentive spirometry and deep breathing  GI: Diet: regular; Monitor bowel function; Bowel regimen (florastor, protonix)  MS: Encourage OOB and ambulation  ID: cont. zosyn as per ID   DVT ppx: Lovenox  Endo: Continue ISS, Monitor blood glucose as per medicine team

## 2017-05-10 LAB
ANION GAP SERPL CALC-SCNC: 11 MMOL/L — SIGNIFICANT CHANGE UP (ref 5–17)
BILIRUB DIRECT SERPL-MCNC: 4.4 MG/DL — HIGH (ref 0–0.3)
BILIRUB INDIRECT FLD-MCNC: 2 MG/DL — HIGH (ref 0.2–1)
BILIRUB SERPL-MCNC: 6.4 MG/DL — HIGH (ref 0.4–2)
BUN SERPL-MCNC: 4 MG/DL — LOW (ref 8–20)
CALCIUM SERPL-MCNC: 8.5 MG/DL — LOW (ref 8.6–10.2)
CHLORIDE SERPL-SCNC: 100 MMOL/L — SIGNIFICANT CHANGE UP (ref 98–107)
CO2 SERPL-SCNC: 25 MMOL/L — SIGNIFICANT CHANGE UP (ref 22–29)
CREAT SERPL-MCNC: 0.38 MG/DL — LOW (ref 0.5–1.3)
GLUCOSE SERPL-MCNC: 67 MG/DL — LOW (ref 70–115)
MAGNESIUM SERPL-MCNC: 1.5 MG/DL — LOW (ref 1.8–2.5)
PHOSPHATE SERPL-MCNC: 2.1 MG/DL — LOW (ref 2.4–4.7)
POTASSIUM SERPL-MCNC: 3.5 MMOL/L — SIGNIFICANT CHANGE UP (ref 3.5–5.3)
POTASSIUM SERPL-SCNC: 3.5 MMOL/L — SIGNIFICANT CHANGE UP (ref 3.5–5.3)
SODIUM SERPL-SCNC: 136 MMOL/L — SIGNIFICANT CHANGE UP (ref 135–145)

## 2017-05-10 PROCEDURE — 99233 SBSQ HOSP IP/OBS HIGH 50: CPT

## 2017-05-10 PROCEDURE — 76937 US GUIDE VASCULAR ACCESS: CPT | Mod: 26

## 2017-05-10 PROCEDURE — 36000 PLACE NEEDLE IN VEIN: CPT

## 2017-05-10 RX ORDER — INSULIN GLARGINE 100 [IU]/ML
25 INJECTION, SOLUTION SUBCUTANEOUS AT BEDTIME
Qty: 0 | Refills: 0 | Status: DISCONTINUED | OUTPATIENT
Start: 2017-05-10 | End: 2017-05-11

## 2017-05-10 RX ORDER — MAGNESIUM SULFATE 500 MG/ML
2 VIAL (ML) INJECTION ONCE
Qty: 0 | Refills: 0 | Status: COMPLETED | OUTPATIENT
Start: 2017-05-10 | End: 2017-05-10

## 2017-05-10 RX ORDER — POTASSIUM PHOSPHATE, MONOBASIC POTASSIUM PHOSPHATE, DIBASIC 236; 224 MG/ML; MG/ML
15 INJECTION, SOLUTION INTRAVENOUS ONCE
Qty: 0 | Refills: 0 | Status: COMPLETED | OUTPATIENT
Start: 2017-05-10 | End: 2017-05-10

## 2017-05-10 RX ORDER — PIPERACILLIN AND TAZOBACTAM 4; .5 G/20ML; G/20ML
3.38 INJECTION, POWDER, LYOPHILIZED, FOR SOLUTION INTRAVENOUS
Qty: 40 | Refills: 0 | OUTPATIENT
Start: 2017-05-10 | End: 2017-05-20

## 2017-05-10 RX ORDER — MAGNESIUM OXIDE 400 MG ORAL TABLET 241.3 MG
400 TABLET ORAL
Qty: 0 | Refills: 0 | Status: DISCONTINUED | OUTPATIENT
Start: 2017-05-10 | End: 2017-05-11

## 2017-05-10 RX ADMIN — PIPERACILLIN AND TAZOBACTAM 25 GRAM(S): 4; .5 INJECTION, POWDER, LYOPHILIZED, FOR SOLUTION INTRAVENOUS at 22:38

## 2017-05-10 RX ADMIN — Medication 20 MILLIEQUIVALENT(S): at 05:28

## 2017-05-10 RX ADMIN — PIPERACILLIN AND TAZOBACTAM 25 GRAM(S): 4; .5 INJECTION, POWDER, LYOPHILIZED, FOR SOLUTION INTRAVENOUS at 12:52

## 2017-05-10 RX ADMIN — Medication 250 MILLIGRAM(S): at 17:03

## 2017-05-10 RX ADMIN — Medication 250 MILLIGRAM(S): at 05:27

## 2017-05-10 RX ADMIN — AMLODIPINE BESYLATE 5 MILLIGRAM(S): 2.5 TABLET ORAL at 05:46

## 2017-05-10 RX ADMIN — ENOXAPARIN SODIUM 40 MILLIGRAM(S): 100 INJECTION SUBCUTANEOUS at 22:38

## 2017-05-10 RX ADMIN — PANTOPRAZOLE SODIUM 40 MILLIGRAM(S): 20 TABLET, DELAYED RELEASE ORAL at 05:28

## 2017-05-10 RX ADMIN — Medication 20 MILLIEQUIVALENT(S): at 17:03

## 2017-05-10 RX ADMIN — Medication 50 GRAM(S): at 10:49

## 2017-05-10 RX ADMIN — MAGNESIUM OXIDE 400 MG ORAL TABLET 400 MILLIGRAM(S): 241.3 TABLET ORAL at 10:50

## 2017-05-10 RX ADMIN — MAGNESIUM OXIDE 400 MG ORAL TABLET 400 MILLIGRAM(S): 241.3 TABLET ORAL at 17:03

## 2017-05-10 RX ADMIN — PIPERACILLIN AND TAZOBACTAM 25 GRAM(S): 4; .5 INJECTION, POWDER, LYOPHILIZED, FOR SOLUTION INTRAVENOUS at 05:27

## 2017-05-10 RX ADMIN — POTASSIUM PHOSPHATE, MONOBASIC POTASSIUM PHOSPHATE, DIBASIC 62.5 MILLIMOLE(S): 236; 224 INJECTION, SOLUTION INTRAVENOUS at 11:55

## 2017-05-10 RX ADMIN — INSULIN GLARGINE 25 UNIT(S): 100 INJECTION, SOLUTION SUBCUTANEOUS at 22:53

## 2017-05-10 RX ADMIN — Medication 6: at 17:14

## 2017-05-10 NOTE — PROCEDURE NOTE - NSPOSTCAREGUIDE_GEN_A_CORE
Instructed patient/caregiver regarding signs and symptoms of infection/Verbal/written post procedure instructions were given to patient/caregiver/Keep the cast/splint/dressing clean and dry/Instructed patient/caregiver to follow-up with primary care physician/Care for catheter as per unit/ICU protocols

## 2017-05-10 NOTE — PROGRESS NOTE ADULT - SUBJECTIVE AND OBJECTIVE BOX
INTERVAL HPI/OVERNIGHT EVENTS:  Pt examined at the bedside resting comfortably, no new events overnight.  Pt has no new complaints at this time.  Pt states he has been up and OOB most of yesterday.      STATUS POST:  PTC    POST OPERATIVE DAY #: 7     SUBJECTIVE:  Flatus: [x] YES [ ] NO             Bowel Movement: [x ] YES [ ] NO  Pain (0-10):            Pain Control Adequate: [x ] YES [ ] NO  Nausea: [ ] YES [x ] NO            Vomiting: [ ] YES [x ] NO  Diarrhea: [ ] YES [x ] NO         Constipation: [ ] YES [x ] NO     Chest Pain: [ ] YES [x ] NO    SOB:  [ ] YES [x ] NO    MEDICATIONS  (STANDING):  pantoprazole    Tablet 40milliGRAM(s) Oral before breakfast  insulin lispro (HumaLOG) corrective regimen sliding scale  SubCutaneous at bedtime  dextrose 5%. 1000milliLiter(s) IV Continuous <Continuous>  dextrose 50% Injectable 12.5Gram(s) IV Push once  dextrose 50% Injectable 25Gram(s) IV Push once  dextrose 50% Injectable 25Gram(s) IV Push once  piperacillin/tazobactam IVPB. 3.375Gram(s) IV Intermittent every 8 hours  sodium chloride 0.9%. 1000milliLiter(s) IV Continuous <Continuous>  insulin lispro (HumaLOG) corrective regimen sliding scale  SubCutaneous three times a day before meals  saccharomyces boulardii 250milliGRAM(s) Oral two times a day  enoxaparin Injectable 40milliGRAM(s) SubCutaneous at bedtime  amLODIPine   Tablet 5milliGRAM(s) Oral daily  insulin glargine Injectable (LANTUS) 30Unit(s) SubCutaneous at bedtime  potassium chloride    Tablet ER 20milliEquivalent(s) Oral two times a day    MEDICATIONS  (PRN):  dextrose Gel 1Dose(s) Oral once PRN Blood Glucose LESS THAN 70 milliGRAM(s)/deciliter  glucagon  Injectable 1milliGRAM(s) IntraMuscular once PRN Glucose LESS THAN 70 milligrams/deciliter  oxyCODONE IR 5milliGRAM(s) Oral every 3 hours PRN Moderate Pain (4 - 6)  oxyCODONE IR 10milliGRAM(s) Oral every 3 hours PRN Severe Pain (7 - 10)      Vital Signs Last 24 Hrs  T(C): 36.8, Max: 37.1 (05-09 @ 20:09)  T(F): 98.3, Max: 98.7 (05-09 @ 20:09)  HR: 73 (68 - 74)  BP: 139/79 (112/66 - 139/79)  RR: 18 (16 - 19)  SpO2: 96% (96% - 99%)    PHYSICAL EXAM:    Constitutional:  NAD, resting comfortably    Eyes: PERRLA, EOM intact, scleral jaundice     Respiratory: CTA b/l, no WRR    Cardiovascular: S1S2, RRR     Gastrointestinal: abd soft, NT/ND, +BSx4, incision C/D/I, biliary drained 400cc in the past 24hrs     Genitourinary: voiding independently     Neurological: AOx3    Skin: warm, dry, jaundice, intact       I&O's Detail    I & Os for current day (as of 10 May 2017 07:17)  =============================================  IN:    sodium chloride 0.9%.: 2300 ml    IV PiggyBack: 150 ml    Total IN: 2450 ml  ---------------------------------------------  OUT:    Drain: 400 ml    Bulb: 20 ml    Total OUT: 420 ml  ---------------------------------------------  Total NET: 2030 ml      LABS:                        9.1    6.5   )-----------( 184      ( 09 May 2017 05:55 )             25.6     05-10    136  |  100  |  4.0<L>  ----------------------------<  67<L>  3.5   |  25.0  |  0.38<L>    Ca    8.5<L>      10 May 2017 06:40  Phos  2.1     05-10  Mg     1.5     05-10    TPro  x   /  Alb  x   /  TBili  6.4<H>  /  DBili  4.4<H>  /  AST  x   /  ALT  x   /  AlkPhos  x   05-10

## 2017-05-10 NOTE — PROCEDURE NOTE - PROCEDURE
Midline catheter insertion  05/10/2017  18G  MIDLINE 10CM length to left brachial vein good heme return ultrasound guidance  Active  NEDA

## 2017-05-10 NOTE — PROGRESS NOTE ADULT - ATTENDING COMMENTS
cont abx
Patient seen and examined.  Agree with above.  Uncap PTC drain - can be discharged home after T bili starts to trend down.

## 2017-05-10 NOTE — PROCEDURE NOTE - NSPROCDETAILS_GEN_ALL_CORE
location identified, draped/prepped, sterile technique used/blood seen on insertion/secured in place/dressing applied/flushes easily/sterile technique, catheter placed/ultrasound utilization

## 2017-05-10 NOTE — PROGRESS NOTE ADULT - ASSESSMENT
57M s/p PTC  Neuro: Pain control as prescribed  Cardio: Monitor Vital signs, cont norvasc   Pulm: Breathing Independently; Incentive spirometry and deep breathing   GI: Diet: Regular, Monitor bowel function; Bowel regimen    MS: Encourage OOB and ambulation  ID: Zosyn per ID  DVT ppx: Subq Hep  Endo: Continue ISS, Monitor blood glucose, medicine following insulin
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
## 2017-05-10 NOTE — CHART NOTE - NSCHARTNOTEFT_GEN_A_CORE
Medicine PA Note    A referral for inpatient midline insertion by ultrasound guidance was received in the medicine department from a PMD/PCP order and an order is present in chart. Patient was registered and identification wrist tag present.  After discussing the risks, benefits and alternatives of this procedure with the patient, informed.  Patient was then draped and prepped for procedure utilizing sterile technique.  Ultrasound revealed patent left brachial vein and image was saved. Preliminary measurements were made by the distance from the venotomy site to the distal tip. Ultrasound guidance for correct side and insertion site marked for procedure.  Laterality measures were performed by the medical team and a time out for correct patient,  correct side , and insertion, and marked with healthcare provider initials. Sterile field created, chlorhexidine gluconate scrub was performed. 21 gauge micropuncture introducer needle was inserted by ultrasound guidance to the left brachial vein and confirmed with heme return. A 0.018 guidewire inserted to secure access and the needle was removed.  A 18G catheter was then inserted over the wire.  A 18G midline inserted to the appropriate length ( 10cm ) and then advanced into place through ultrasound observation. The guidewire was then removed.  By ultrasound guidance the catheter was purported to be a good position.  The site was then dressed with an occlusive dressing with lock anchor and no heme drainage was noted.   There were no immediate complications.  Patient was given discharge instructions and paperwork for care and maintenance. The patient was then given instruction to go to PCP/PMD or local emergency room if swelling, redness, tenderness, fever or drainage was to occur.  Patient tolerated procedure well.

## 2017-05-10 NOTE — PROGRESS NOTE ADULT - ASSESSMENT
57M with unresectable duodenal/pancreatic mass scheduled to start chemo this Thursday presented to his PMD today with jaundice.  He was found to have an elevated bilirubin at 12 reportedly despite having cholecystostomy in place with normal output.  Patient underwent abdominal US which revealed dilation of the CBD as well as intrahepatic bile duct.   PT WITH FEVERS AND WITH ECOLI    BACTEREMIA S/P CHOLEYSTOTOMY   FLUID CX  ECOLI ENTEROCOCCUS  PT REPEAT CX ARE NEGATIVE  D/W SURGICAL ATTENDING  WILL PLAN 2 WEEKS OF IV ABX  THROUGH MAY 20TH   ZOSYN   RX GIVEN TO  TO SEND TO HOME IV COMPANY  MIDLINE ORDERED  UNFORTUNATELY NO ADEQUATE ORAL ABX AGENT TO TREAT THIS INFECTION IN THIS IMMUNOCOMPROMISED PATIENT  PT WILL LIKELY NEED IV CHEMO AND WOULD LIKE TO ERADICATE INFECTION    WITH CONTINUED IV ABX AT HOME

## 2017-05-10 NOTE — PROGRESS NOTE ADULT - ASSESSMENT
57M with unresectable duodenal/pancreatic mass scheduled to start chemo presented to his PMD  with jaundice.  He was found to have an elevated bilirubin at 12 reportedly despite having cholecystostomy in place with normal output.  Patient underwent abdominal US which revealed dilation of the CBD as well as intrahepatic bile duct. (02 May 2017 20:26). S/p Cholangiogram, percutaneous transhepatic, with biliary drainage  by IR on 5/3 . Spiked fever and found to be septic secondary to  bacteriemia ( E. Coli ) , started on iv abx .      Problem/Recommendation - 1:  Problem: Sepsis due to Escherichia coli. Recommendation: on Zosyn , follow  blood cultures , Florastor added  ,  ID consult note noted and appreciated .     Problem/Recommendation - 2:  ·  Problem: Duodenal adenocarcinoma.  Recommendation: follow up with oncologist.     Problem/Recommendation - 3:  ·  Problem: Malignant neoplasm of pancreas, unspecified location of malignancy.  Recommendation: follow up with oncologist for planned chemo tx.     Problem/Recommendation - 4:  ·  Problem: Essential hypertension.  Recommendation: continue home meds with parameters.     Problem/Recommendation - 5:  ·  Problem: Hyperlipidemia, unspecified hyperlipidemia type.  Recommendation: not on meds.     Problem/Recommendation - 6:  Problem: Anemia in neoplastic disease. Recommendation: asymptomatic , stable     Problem/Recommendation - 7:  Problem: DM type 2 (diabetes mellitus, type 2). Recommendation: Diet , ISSC , accu check , Lantus increased to 30 units , and titrate as needed.    Problem/Recommendation - 8:  Problem: Hypomagnesemia. Recommendation: supplemented .    Problem/Recommendation - 9:  Problem: Prophylactic measure. Recommendation: Lovenox if OK with colorectal team.    Problem/Recommendation - 10:  Problem: Cholangitis. Recommendation: continue iv abx , ID follow up , follow up cultures .    Problem / Plan - 11: Hypokalemia - will supplement    Problem / Plan - 12: Hypophosphatemia - will supplement     Problem / Plan - Thrombocytopenia - resolved 57M with unresectable duodenal/pancreatic mass scheduled to start chemo presented to his PMD  with jaundice.  He was found to have an elevated bilirubin at 12 reportedly despite having cholecystostomy in place with normal output.  Patient underwent abdominal US which revealed dilation of the CBD as well as intrahepatic bile duct. (02 May 2017 20:26). S/p Cholangiogram, percutaneous transhepatic, with biliary drainage  by IR on 5/3 . Spiked fever and found to be septic secondary to  bacteriemia ( E. Coli ) , started on iv abx .      Problem/Recommendation - 1:  Problem: Sepsis due to Escherichia coli. Recommendation: on Zosyn , follow  blood cultures , Florastor added  ,  ID consult note noted and appreciated . Awaiting for  repeated blood cultures .    Problem/Recommendation - 2:  ·  Problem: Duodenal adenocarcinoma.  Recommendation: follow up with oncologist.     Problem/Recommendation - 3:  ·  Problem: Malignant neoplasm of pancreas, unspecified location of malignancy.  Recommendation: follow up with oncologist for planned chemo tx.     Problem/Recommendation - 4:  ·  Problem: Essential hypertension.  Recommendation: continue home meds with parameters.     Problem/Recommendation - 5:  ·  Problem: Hyperlipidemia, unspecified hyperlipidemia type.  Recommendation: not on meds.     Problem/Recommendation - 6:  Problem: Anemia in neoplastic disease. Recommendation: asymptomatic , stable     Problem/Recommendation - 7:  Problem: DM type 2 (diabetes mellitus, type 2). Recommendation: Diet , ISSC , accu check. This morning f/s on lower side , will decrease Lantus to 25 units .    Problem/Recommendation - 8:  Problem: Hypomagnesemia. Recommendation: will  supplement , add oral Mag and d/c home on oral supplements  .    Problem/Recommendation - 9:  Problem: Prophylactic measure. Recommendation: Lovenox    Problem/Recommendation - 10:  Problem: Cholangitis. Recommendation: continue iv abx , ID follow up , follow up cultures .    Problem / Plan - 11: Hypokalemia - supplemented    Problem / Plan - 12: Hypophosphatemia - will supplement

## 2017-05-10 NOTE — PROGRESS NOTE ADULT - SUBJECTIVE AND OBJECTIVE BOX
KAIT VARGHESE is a 57y Male with HPI:    57M with unresectable duodenal/pancreatic mass scheduled to start chemo this Thursday presented to his PMD today with jaundice.  He was found to have an elevated bilirubin at 12 reportedly despite having cholecystostomy in place with normal output.  Patient underwent abdominal US which revealed dilation of the CBD as well as intrahepatic bile duct.   PT WITH FEVERS AND WITH ECOLI BACTEREMIA S/P CHOLEYSTOTOMY   FLUID CX ECOL ENTEROCOCCUS  ON ZOSYN AFEBRILE    Allergies:  No Known Allergies      Medications:  pantoprazole    Tablet 40milliGRAM(s) Oral before breakfast  insulin lispro (HumaLOG) corrective regimen sliding scale  SubCutaneous at bedtime  dextrose 5%. 1000milliLiter(s) IV Continuous <Continuous>  dextrose Gel 1Dose(s) Oral once PRN  dextrose 50% Injectable 12.5Gram(s) IV Push once  dextrose 50% Injectable 25Gram(s) IV Push once  dextrose 50% Injectable 25Gram(s) IV Push once  glucagon  Injectable 1milliGRAM(s) IntraMuscular once PRN  piperacillin/tazobactam IVPB. 3.375Gram(s) IV Intermittent every 8 hours  oxyCODONE IR 5milliGRAM(s) Oral every 3 hours PRN  oxyCODONE IR 10milliGRAM(s) Oral every 3 hours PRN  sodium chloride 0.9%. 1000milliLiter(s) IV Continuous <Continuous>  insulin lispro (HumaLOG) corrective regimen sliding scale  SubCutaneous three times a day before meals  saccharomyces boulardii 250milliGRAM(s) Oral two times a day  enoxaparin Injectable 40milliGRAM(s) SubCutaneous at bedtime  amLODIPine   Tablet 5milliGRAM(s) Oral daily  insulin glargine Injectable (LANTUS) 30Unit(s) SubCutaneous at bedtime  potassium phosphate IVPB 15milliMole(s) IV Intermittent once  potassium chloride    Tablet ER 20milliEquivalent(s) Oral two times a day      ANTIBIOTICS:    ZOSYN     Review of Systems: - Negative except as mentioned above     Physical Exam:   Vital Signs Last 24 Hrs  T(C): 37.2, Max: 37.2 (05-10 @ 07:59)  T(F): 98.9, Max: 98.9 (05-10 @ 07:59)  HR: 70 (68 - 74)  BP: 121/80 (121/80 - 139/79)  BP(mean): --  RR: 18 (17 - 19)  SpO2: 96% (96% - 99%)    GEN: NAD, pleasant NO ICTERUS  HEENT: normocephalic and atraumatic. EOMI. LORRI...  NECK: Supple. No carotid bruits.  No lymphadenopathy or thyromegaly.  LUNGS: Clear to auscultation.  HEART: Regular rate and rhythm without murmur.  ABDOMEN: Soft, nontender, and nondistended.  Positive bowel sounds.  No hepatosplenomegaly was noted.  NO REBOUND NO GUARDING  EXTREMITIES: Without any cyanosis, clubbing, rash, lesions or edema.  NEUROLOGIC: Cranial nerves II through XII are grossly intact.    SKIN: No ulceration or induration present.      Labs:  05-09    133<L>  |  99  |  7.0<L>  ----------------------------<  266<H>  3.4<L>   |  24.0  |  0.28<L>    Ca    8.1<L>      09 May 2017 05:55  Phos  2.1     05-09  Mg     1.8     05-09    TPro  5.2<L>  /  Alb  2.5<L>  /  TBili  8.2<H>  /  DBili  x   /  AST  29  /  ALT  59<H>  /  AlkPhos  311<H>  05-08                          9.1    6.5   )-----------( 184      ( 09 May 2017 05:55 )             25.6           LIVER FUNCTIONS - ( 08 May 2017 06:50 )  Alb: 2.5 g/dL / Pro: 5.2 g/dL / ALK PHOS: 311 U/L / ALT: 59 U/L / AST: 29 U/L / GGT: x               CAPILLARY BLOOD GLUCOSE  312 (08 May 2017 23:15)  275 (08 May 2017 17:11)  290 (08 May 2017 13:00)        RECENT CULTURES:  05-07 .Blood Blood Escherichia coli XXXX   Growth in aerobic and anaerobic bottles: Escherichia coli  Aerobic Bottle: 7:45 Hours to positivity  Anaerobic Bottle: 8:05 Hours to positivity  .  TYPE: (C=Critical, N=Notification, A=Abnormal) c  TESTS:  _   DATE/TIME CALLED: _ 05/07/2017 15:15:    05-06 .Body Fluid Bile Fluid Escherichia coli  Enterococcus faecalis   No White blood cells  Few Gram Negative Rods   Numerous Escherichia coli  Numerous Enterococcus faecalis  Numerous Alpha hemolytic strep Identification and susceptibility to  follow.  Culture in progress    05-06 .Blood Blood-Venous Escherichia coli XXXX   Growth in aerobic and anaerobic bottles: Escherichia coli  Anaerobic Bottle: 7:44 Hours to positivity  Aerobic Bottle: 8:35 Hours to positivity  .  TYPE: (C=Critical, N=Notification, A=Abnormal) C  TESTS:  _ Bld   DATE/TIME CALLED: _ 05/06/2017 20 05-02 .Blood Blood-Peripheral XXXX XXXX   No growth at 5 days.    05-02 .Blood Blood-Peripheral XXXX XXXX   No growth at 5 days.

## 2017-05-11 VITALS
RESPIRATION RATE: 18 BRPM | OXYGEN SATURATION: 98 % | DIASTOLIC BLOOD PRESSURE: 75 MMHG | TEMPERATURE: 99 F | SYSTOLIC BLOOD PRESSURE: 134 MMHG | HEART RATE: 67 BPM

## 2017-05-11 LAB
ANION GAP SERPL CALC-SCNC: 10 MMOL/L — SIGNIFICANT CHANGE UP (ref 5–17)
BUN SERPL-MCNC: 5 MG/DL — LOW (ref 8–20)
CALCIUM SERPL-MCNC: 8.5 MG/DL — LOW (ref 8.6–10.2)
CHLORIDE SERPL-SCNC: 98 MMOL/L — SIGNIFICANT CHANGE UP (ref 98–107)
CO2 SERPL-SCNC: 28 MMOL/L — SIGNIFICANT CHANGE UP (ref 22–29)
CREAT SERPL-MCNC: 0.46 MG/DL — LOW (ref 0.5–1.3)
CULTURE RESULTS: SIGNIFICANT CHANGE UP
GLUCOSE SERPL-MCNC: 80 MG/DL — SIGNIFICANT CHANGE UP (ref 70–115)
MAGNESIUM SERPL-MCNC: 1.6 MG/DL — LOW (ref 1.8–2.5)
ORGANISM # SPEC MICROSCOPIC CNT: SIGNIFICANT CHANGE UP
PHOSPHATE SERPL-MCNC: 2.4 MG/DL — SIGNIFICANT CHANGE UP (ref 2.4–4.7)
POTASSIUM SERPL-MCNC: 4 MMOL/L — SIGNIFICANT CHANGE UP (ref 3.5–5.3)
POTASSIUM SERPL-SCNC: 4 MMOL/L — SIGNIFICANT CHANGE UP (ref 3.5–5.3)
SODIUM SERPL-SCNC: 136 MMOL/L — SIGNIFICANT CHANGE UP (ref 135–145)
SPECIMEN SOURCE: SIGNIFICANT CHANGE UP

## 2017-05-11 PROCEDURE — 99232 SBSQ HOSP IP/OBS MODERATE 35: CPT | Mod: 24

## 2017-05-11 PROCEDURE — 99232 SBSQ HOSP IP/OBS MODERATE 35: CPT

## 2017-05-11 PROCEDURE — 99233 SBSQ HOSP IP/OBS HIGH 50: CPT

## 2017-05-11 RX ORDER — INSULIN GLARGINE 100 [IU]/ML
38 INJECTION, SOLUTION SUBCUTANEOUS
Qty: 0 | Refills: 0 | COMMUNITY

## 2017-05-11 RX ORDER — MAGNESIUM OXIDE 400 MG ORAL TABLET 241.3 MG
1 TABLET ORAL
Qty: 45 | Refills: 0 | OUTPATIENT
Start: 2017-05-11 | End: 2017-05-26

## 2017-05-11 RX ORDER — MAGNESIUM OXIDE 400 MG ORAL TABLET 241.3 MG
400 TABLET ORAL
Qty: 0 | Refills: 0 | Status: DISCONTINUED | OUTPATIENT
Start: 2017-05-11 | End: 2017-05-11

## 2017-05-11 RX ORDER — INSULIN GLARGINE 100 [IU]/ML
20 INJECTION, SOLUTION SUBCUTANEOUS AT BEDTIME
Qty: 0 | Refills: 0 | Status: DISCONTINUED | OUTPATIENT
Start: 2017-05-11 | End: 2017-05-11

## 2017-05-11 RX ADMIN — MAGNESIUM OXIDE 400 MG ORAL TABLET 400 MILLIGRAM(S): 241.3 TABLET ORAL at 12:22

## 2017-05-11 RX ADMIN — Medication 250 MILLIGRAM(S): at 06:29

## 2017-05-11 RX ADMIN — MAGNESIUM OXIDE 400 MG ORAL TABLET 400 MILLIGRAM(S): 241.3 TABLET ORAL at 18:30

## 2017-05-11 RX ADMIN — Medication 3: at 12:24

## 2017-05-11 RX ADMIN — PIPERACILLIN AND TAZOBACTAM 25 GRAM(S): 4; .5 INJECTION, POWDER, LYOPHILIZED, FOR SOLUTION INTRAVENOUS at 06:29

## 2017-05-11 RX ADMIN — MAGNESIUM OXIDE 400 MG ORAL TABLET 400 MILLIGRAM(S): 241.3 TABLET ORAL at 08:59

## 2017-05-11 RX ADMIN — PANTOPRAZOLE SODIUM 40 MILLIGRAM(S): 20 TABLET, DELAYED RELEASE ORAL at 06:29

## 2017-05-11 RX ADMIN — Medication 250 MILLIGRAM(S): at 18:30

## 2017-05-11 RX ADMIN — Medication 20 MILLIEQUIVALENT(S): at 06:30

## 2017-05-11 RX ADMIN — AMLODIPINE BESYLATE 5 MILLIGRAM(S): 2.5 TABLET ORAL at 06:29

## 2017-05-11 RX ADMIN — PIPERACILLIN AND TAZOBACTAM 25 GRAM(S): 4; .5 INJECTION, POWDER, LYOPHILIZED, FOR SOLUTION INTRAVENOUS at 14:14

## 2017-05-11 NOTE — PROGRESS NOTE ADULT - SUBJECTIVE AND OBJECTIVE BOX
KAIT VARGHESE is a 57y Male with HPI:    57M with unresectable duodenal/pancreatic mass scheduled to start chemo this Thursday presented to his PMD today with jaundice.  He was found to have an elevated bilirubin at 12 reportedly despite having cholecystostomy in place with normal output.  Patient underwent abdominal US which revealed dilation of the CBD as well as intrahepatic bile duct.   PT WITH FEVERS AND WITH ECOLI BACTEREMIA S/P CHOLEYSTOTOMY   FLUID CX ECOL ENTEROCOCCUS  ON ZOSYN AFEBRILE  MIDLINE PLACED YESTERDAY  FOR HOME IV TX    Allergies:  No Known Allergies      Medications:  pantoprazole    Tablet 40milliGRAM(s) Oral before breakfast  insulin lispro (HumaLOG) corrective regimen sliding scale  SubCutaneous at bedtime  dextrose 5%. 1000milliLiter(s) IV Continuous <Continuous>  dextrose Gel 1Dose(s) Oral once PRN  dextrose 50% Injectable 12.5Gram(s) IV Push once  dextrose 50% Injectable 25Gram(s) IV Push once  dextrose 50% Injectable 25Gram(s) IV Push once  glucagon  Injectable 1milliGRAM(s) IntraMuscular once PRN  piperacillin/tazobactam IVPB. 3.375Gram(s) IV Intermittent every 8 hours  oxyCODONE IR 5milliGRAM(s) Oral every 3 hours PRN  oxyCODONE IR 10milliGRAM(s) Oral every 3 hours PRN  sodium chloride 0.9%. 1000milliLiter(s) IV Continuous <Continuous>  insulin lispro (HumaLOG) corrective regimen sliding scale  SubCutaneous three times a day before meals  saccharomyces boulardii 250milliGRAM(s) Oral two times a day  enoxaparin Injectable 40milliGRAM(s) SubCutaneous at bedtime  amLODIPine   Tablet 5milliGRAM(s) Oral daily  insulin glargine Injectable (LANTUS) 30Unit(s) SubCutaneous at bedtime  potassium phosphate IVPB 15milliMole(s) IV Intermittent once  potassium chloride    Tablet ER 20milliEquivalent(s) Oral two times a day      ANTIBIOTICS:    ZOSYN     Review of Systems: - Negative except as mentioned above     Physical Exam:   Vital Signs Last 24 Hrs  T(C): 37.2, Max: 37.2 (05-10 @ 07:59)  T(F): 98.9, Max: 98.9 (05-10 @ 07:59)  HR: 70 (68 - 74)  BP: 121/80 (121/80 - 139/79)  BP(mean): --  RR: 18 (17 - 19)  SpO2: 96% (96% - 99%)    GEN: NAD, pleasant NO ICTERUS  HEENT: normocephalic and atraumatic. EOMI. LORRI...  NECK: Supple. No carotid bruits.  No lymphadenopathy or thyromegaly.  LUNGS: Clear to auscultation.  HEART: Regular rate and rhythm without murmur.  ABDOMEN: DRAINS IN PLACE  NEUROLOGIC: Cranial nerves II through XII are grossly intact.    SKIN: No ulceration or induration present.      Labs:  05-09    133<L>  |  99  |  7.0<L>  ----------------------------<  266<H>  3.4<L>   |  24.0  |  0.28<L>    Ca    8.1<L>      09 May 2017 05:55  Phos  2.1     05-09  Mg     1.8     05-09    TPro  5.2<L>  /  Alb  2.5<L>  /  TBili  8.2<H>  /  DBili  x   /  AST  29  /  ALT  59<H>  /  AlkPhos  311<H>  05-08                          9.1    6.5   )-----------( 184      ( 09 May 2017 05:55 )             25.6           LIVER FUNCTIONS - ( 08 May 2017 06:50 )  Alb: 2.5 g/dL / Pro: 5.2 g/dL / ALK PHOS: 311 U/L / ALT: 59 U/L / AST: 29 U/L / GGT: x               CAPILLARY BLOOD GLUCOSE  312 (08 May 2017 23:15)  275 (08 May 2017 17:11)  290 (08 May 2017 13:00)        RECENT CULTURES:  05-07 .Blood Blood Escherichia coli XXXX   Growth in aerobic and anaerobic bottles: Escherichia coli  Aerobic Bottle: 7:45 Hours to positivity  Anaerobic Bottle: 8:05 Hours to positivity  .  TYPE: (C=Critical, N=Notification, A=Abnormal) c  TESTS:  _   DATE/TIME CALLED: _ 05/07/2017 15:15:    05-06 .Body Fluid Bile Fluid Escherichia coli  Enterococcus faecalis   No White blood cells  Few Gram Negative Rods   Numerous Escherichia coli  Numerous Enterococcus faecalis  Numerous Alpha hemolytic strep Identification and susceptibility to  follow.  Culture in progress    05-06 .Blood Blood-Venous Escherichia coli XXXX   Growth in aerobic and anaerobic bottles: Escherichia coli  Anaerobic Bottle: 7:44 Hours to positivity  Aerobic Bottle: 8:35 Hours to positivity  .  TYPE: (C=Critical, N=Notification, A=Abnormal) C  TESTS:  _ Bld gs  DATE/TIME CALLED: _ 05/06/2017 20 05-02 .Blood Blood-Peripheral XXXX XXXX   No growth at 5 days.    05-02 .Blood Blood-Peripheral XXXX XXXX   No growth at 5 days.

## 2017-05-11 NOTE — PROGRESS NOTE ADULT - ASSESSMENT
57M with unresectable duodenal/pancreatic mass scheduled to start chemo this Thursday presented to his PMD today with jaundice.  He was found to have an elevated bilirubin at 12 reportedly despite having cholecystostomy in place with normal output.  Patient underwent abdominal US which revealed dilation of the CBD as well as intrahepatic bile duct.   PT WITH FEVERS AND WITH ECOLI    BACTEREMIA S/P CHOLEYSTOTOMY   FLUID CX  ECOLI ENTEROCOCCUS  PT REPEAT CX ARE NEGATIVE  D/W SURGICAL ATTENDING  WILL PLAN 2 WEEKS OF IV ABX  THROUGH MAY 20TH   MIDLINE PLACED YESTERDAY  FOR   ZOSYN    UNFORTUNATELY NO ADEQUATE ORAL ABX AGENT TO TREAT THIS INFECTION IN THIS IMMUNOCOMPROMISED PATIENT  PT WILL LIKELY NEED IV CHEMO AND WOULD LIKE TO ERADICATE INFECTION    WITH CONTINUED IV ABX AT HOME  WILL FOLLLOW UP AS NEEDED   PLEASE CALL IF QUESTIONS

## 2017-05-11 NOTE — PROGRESS NOTE ADULT - ASSESSMENT
57M with unresectable duodenal/pancreatic mass scheduled to start chemo presented to his PMD  with jaundice.  He was found to have an elevated bilirubin at 12 reportedly despite having cholecystostomy in place with normal output.  Patient underwent abdominal US which revealed dilation of the CBD as well as intrahepatic bile duct. (02 May 2017 20:26). S/p Cholangiogram, percutaneous transhepatic, with biliary drainage  by IR on 5/3 . Spiked fever and found to be septic secondary to  bacteriemia ( E. Coli ) , cholecystostomy fluid Cx + , started on iv abx .  S/P mid line placement , will need total 2 wks of iv Abx .    Problem/Recommendation - 1:  Problem: Sepsis due to bacteriemia with  Escherichia coli. Recommendation: on Zosyn ,  , Florastor added  ,  ID consult note noted and appreciated . Will need 2 wks iv abx     Problem/Recommendation - 2:  ·  Problem: Duodenal adenocarcinoma.  Recommendation: follow up with oncologist.     Problem/Recommendation - 3:  ·  Problem: Malignant neoplasm of pancreas, unspecified location of malignancy.  Recommendation: follow up with oncologist for planned chemo tx.     Problem/Recommendation - 4:  ·  Problem: Essential hypertension.  Recommendation: continue home meds with parameters.     Problem/Recommendation - 5:  ·  Problem: Hyperlipidemia, unspecified hyperlipidemia type.  Recommendation: not on meds.     Problem/Recommendation - 6:  Problem: Anemia in neoplastic disease. Recommendation: asymptomatic , stable     Problem/Recommendation - 7:  Problem: DM type 2 (diabetes mellitus, type 2). Recommendation: Diet , ISSC , accu check. This morning f/s on lower side , will decrease Lantus to 20 units, and titrate as needed.    Problem/Recommendation - 8:  Problem: Hypomagnesemia. Recommendation: will  supplement , add oral Mag and d/c home on oral supplements  .    Problem/Recommendation - 9:  Problem: Prophylactic measure. Recommendation: Lovenox    Problem/Recommendation - 10:  Problem: Cholangitis. Recommendation: continue iv abx  as per ID     Problem / Plan - 11: Hypokalemia - supplemented    Problem / Plan - 12: Hypophosphatemia - supplemented

## 2017-05-11 NOTE — PROGRESS NOTE ADULT - SUBJECTIVE AND OBJECTIVE BOX
Pt seen and examined.  No acute events overnight.  No fevers or chills.  Tolerating diet.  Drain with bilious drainage.  Midline placed on IV abx    AVSS  Abd soft NT ND

## 2017-05-11 NOTE — PROGRESS NOTE ADULT - ASSESSMENT
locally advanced pancreatic cancer s/p PTC placement, with subsequent cholangitis - on IV abx with stabilization    - Discharge today  - IV abx as per Dr. Arguello  - Follow up with Dr. Valderrama regarding chemotherapy
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
## 2017-05-13 LAB
CULTURE RESULTS: SIGNIFICANT CHANGE UP
SPECIMEN SOURCE: SIGNIFICANT CHANGE UP

## 2017-05-22 ENCOUNTER — OTHER (OUTPATIENT)
Age: 57
End: 2017-05-22

## 2017-05-22 ENCOUNTER — OUTPATIENT (OUTPATIENT)
Dept: OUTPATIENT SERVICES | Facility: HOSPITAL | Age: 57
LOS: 1 days | Discharge: HOME | End: 2017-05-22

## 2017-05-22 DIAGNOSIS — Z79.899 OTHER LONG TERM (CURRENT) DRUG THERAPY: ICD-10-CM

## 2017-05-22 DIAGNOSIS — Z96.89 PRESENCE OF OTHER SPECIFIED FUNCTIONAL IMPLANTS: Chronic | ICD-10-CM

## 2017-05-22 DIAGNOSIS — C25.9 MALIGNANT NEOPLASM OF PANCREAS, UNSPECIFIED: Chronic | ICD-10-CM

## 2017-05-22 DIAGNOSIS — C17.0 MALIGNANT NEOPLASM OF DUODENUM: ICD-10-CM

## 2017-05-22 DIAGNOSIS — Z98.890 OTHER SPECIFIED POSTPROCEDURAL STATES: Chronic | ICD-10-CM

## 2017-05-24 ENCOUNTER — APPOINTMENT (OUTPATIENT)
Dept: HEMATOLOGY ONCOLOGY | Facility: CLINIC | Age: 57
End: 2017-05-24

## 2017-05-24 ENCOUNTER — RESULT REVIEW (OUTPATIENT)
Age: 57
End: 2017-05-24

## 2017-05-24 VITALS
TEMPERATURE: 97.4 F | WEIGHT: 134.59 LBS | OXYGEN SATURATION: 100 % | HEART RATE: 85 BPM | BODY MASS INDEX: 22.42 KG/M2 | RESPIRATION RATE: 16 BRPM | SYSTOLIC BLOOD PRESSURE: 113 MMHG | DIASTOLIC BLOOD PRESSURE: 77 MMHG | HEIGHT: 65 IN

## 2017-05-24 LAB
BASOPHILS # BLD AUTO: 0.1 K/UL — SIGNIFICANT CHANGE UP (ref 0–0.2)
BASOPHILS NFR BLD AUTO: 0.7 % — SIGNIFICANT CHANGE UP (ref 0–2)
EOSINOPHIL # BLD AUTO: 0 K/UL — SIGNIFICANT CHANGE UP (ref 0–0.5)
EOSINOPHIL NFR BLD AUTO: 0.2 % — SIGNIFICANT CHANGE UP (ref 0–6)
HCT VFR BLD CALC: 31.6 % — LOW (ref 39–50)
HGB BLD-MCNC: 10.5 G/DL — LOW (ref 13–17)
LYMPHOCYTES # BLD AUTO: 1.5 K/UL — SIGNIFICANT CHANGE UP (ref 1–3.3)
LYMPHOCYTES # BLD AUTO: 19.2 % — SIGNIFICANT CHANGE UP (ref 13–44)
MCHC RBC-ENTMCNC: 27.6 PG — SIGNIFICANT CHANGE UP (ref 27–34)
MCHC RBC-ENTMCNC: 33.3 GM/DL — SIGNIFICANT CHANGE UP (ref 32–36)
MCV RBC AUTO: 82.9 FL — SIGNIFICANT CHANGE UP (ref 80–100)
MONOCYTES # BLD AUTO: 0.5 K/UL — SIGNIFICANT CHANGE UP (ref 0–0.9)
MONOCYTES NFR BLD AUTO: 5.9 % — SIGNIFICANT CHANGE UP (ref 2–14)
NEUTROPHILS # BLD AUTO: 5.7 K/UL — SIGNIFICANT CHANGE UP (ref 1.8–7.4)
NEUTROPHILS NFR BLD AUTO: 74 % — SIGNIFICANT CHANGE UP (ref 43–77)
PLATELET # BLD AUTO: 403 K/UL — HIGH (ref 150–400)
RBC # BLD: 3.81 M/UL — LOW (ref 4.2–5.8)
RBC # FLD: 17.2 % — HIGH (ref 10.3–14.5)
WBC # BLD: 7.8 K/UL — SIGNIFICANT CHANGE UP (ref 3.8–10.5)
WBC # FLD AUTO: 7.8 K/UL — SIGNIFICANT CHANGE UP (ref 3.8–10.5)

## 2017-05-25 ENCOUNTER — APPOINTMENT (OUTPATIENT)
Dept: INFUSION THERAPY | Facility: CLINIC | Age: 57
End: 2017-05-25

## 2017-05-25 ENCOUNTER — LABORATORY RESULT (OUTPATIENT)
Age: 57
End: 2017-05-25

## 2017-05-25 ENCOUNTER — OTHER (OUTPATIENT)
Age: 57
End: 2017-05-25

## 2017-05-26 ENCOUNTER — CLINICAL ADVICE (OUTPATIENT)
Age: 57
End: 2017-05-26

## 2017-05-26 ENCOUNTER — OTHER (OUTPATIENT)
Age: 57
End: 2017-05-26

## 2017-05-26 DIAGNOSIS — R11.2 NAUSEA WITH VOMITING, UNSPECIFIED: ICD-10-CM

## 2017-05-26 DIAGNOSIS — Z51.11 ENCOUNTER FOR ANTINEOPLASTIC CHEMOTHERAPY: ICD-10-CM

## 2017-05-26 LAB
ALBUMIN SERPL ELPH-MCNC: 3.7 G/DL
ALP BLD-CCNC: 192 U/L
ALT SERPL-CCNC: 26 U/L
ANION GAP SERPL CALC-SCNC: 12 MMOL/L
AST SERPL-CCNC: 19 U/L
BILIRUB SERPL-MCNC: 2.7 MG/DL
BUN SERPL-MCNC: 9 MG/DL
CALCIUM SERPL-MCNC: 9.1 MG/DL
CHLORIDE SERPL-SCNC: 99 MMOL/L
CO2 SERPL-SCNC: 24 MMOL/L
CREAT SERPL-MCNC: 0.6 MG/DL
GLUCOSE SERPL-MCNC: 196 MG/DL
MAGNESIUM SERPL-MCNC: 1.5 MG/DL
POTASSIUM SERPL-SCNC: 4.6 MMOL/L
PROT SERPL-MCNC: 6.5 G/DL
SODIUM SERPL-SCNC: 135 MMOL/L

## 2017-06-07 ENCOUNTER — APPOINTMENT (OUTPATIENT)
Dept: HEMATOLOGY ONCOLOGY | Facility: CLINIC | Age: 57
End: 2017-06-07

## 2017-06-08 ENCOUNTER — RESULT REVIEW (OUTPATIENT)
Age: 57
End: 2017-06-08

## 2017-06-08 ENCOUNTER — LABORATORY RESULT (OUTPATIENT)
Age: 57
End: 2017-06-08

## 2017-06-08 ENCOUNTER — APPOINTMENT (OUTPATIENT)
Dept: INFUSION THERAPY | Facility: CLINIC | Age: 57
End: 2017-06-08

## 2017-06-08 LAB
BASOPHILS # BLD AUTO: 0 K/UL — SIGNIFICANT CHANGE UP (ref 0–0.2)
BASOPHILS NFR BLD AUTO: 0.5 % — SIGNIFICANT CHANGE UP (ref 0–2)
EOSINOPHIL # BLD AUTO: 0 K/UL — SIGNIFICANT CHANGE UP (ref 0–0.5)
EOSINOPHIL NFR BLD AUTO: 0.5 % — SIGNIFICANT CHANGE UP (ref 0–6)
HCT VFR BLD CALC: 28.6 % — LOW (ref 39–50)
HGB BLD-MCNC: 9.7 G/DL — LOW (ref 13–17)
LYMPHOCYTES # BLD AUTO: 1.5 K/UL — SIGNIFICANT CHANGE UP (ref 1–3.3)
LYMPHOCYTES # BLD AUTO: 20.1 % — SIGNIFICANT CHANGE UP (ref 13–44)
MCHC RBC-ENTMCNC: 28.2 PG — SIGNIFICANT CHANGE UP (ref 27–34)
MCHC RBC-ENTMCNC: 33.7 GM/DL — SIGNIFICANT CHANGE UP (ref 32–36)
MCV RBC AUTO: 83.6 FL — SIGNIFICANT CHANGE UP (ref 80–100)
MONOCYTES # BLD AUTO: 0.5 K/UL — SIGNIFICANT CHANGE UP (ref 0–0.9)
MONOCYTES NFR BLD AUTO: 6.6 % — SIGNIFICANT CHANGE UP (ref 2–14)
NEUTROPHILS # BLD AUTO: 5.4 K/UL — SIGNIFICANT CHANGE UP (ref 1.8–7.4)
NEUTROPHILS NFR BLD AUTO: 72.3 % — SIGNIFICANT CHANGE UP (ref 43–77)
PLATELET # BLD AUTO: 179 K/UL — SIGNIFICANT CHANGE UP (ref 150–400)
RBC # BLD: 3.42 M/UL — LOW (ref 4.2–5.8)
RBC # FLD: 17.5 % — HIGH (ref 10.3–14.5)
WBC # BLD: 7.5 K/UL — SIGNIFICANT CHANGE UP (ref 3.8–10.5)
WBC # FLD AUTO: 7.5 K/UL — SIGNIFICANT CHANGE UP (ref 3.8–10.5)

## 2017-06-14 ENCOUNTER — INPATIENT (INPATIENT)
Facility: HOSPITAL | Age: 57
LOS: 0 days | Discharge: HOME | DRG: 949 | End: 2017-06-15
Attending: INTERNAL MEDICINE | Admitting: INTERNAL MEDICINE
Payer: MEDICAID

## 2017-06-14 VITALS
HEIGHT: 66 IN | DIASTOLIC BLOOD PRESSURE: 77 MMHG | WEIGHT: 138.01 LBS | HEART RATE: 93 BPM | RESPIRATION RATE: 16 BRPM | TEMPERATURE: 98 F | OXYGEN SATURATION: 99 % | SYSTOLIC BLOOD PRESSURE: 162 MMHG

## 2017-06-14 DIAGNOSIS — I10 ESSENTIAL (PRIMARY) HYPERTENSION: ICD-10-CM

## 2017-06-14 DIAGNOSIS — Z98.890 OTHER SPECIFIED POSTPROCEDURAL STATES: Chronic | ICD-10-CM

## 2017-06-14 DIAGNOSIS — Z96.89 PRESENCE OF OTHER SPECIFIED FUNCTIONAL IMPLANTS: Chronic | ICD-10-CM

## 2017-06-14 DIAGNOSIS — T14.8 OTHER INJURY OF UNSPECIFIED BODY REGION: ICD-10-CM

## 2017-06-14 DIAGNOSIS — C25.9 MALIGNANT NEOPLASM OF PANCREAS, UNSPECIFIED: Chronic | ICD-10-CM

## 2017-06-14 DIAGNOSIS — E11.49 TYPE 2 DIABETES MELLITUS WITH OTHER DIABETIC NEUROLOGICAL COMPLICATION: ICD-10-CM

## 2017-06-14 DIAGNOSIS — C17.0 MALIGNANT NEOPLASM OF DUODENUM: ICD-10-CM

## 2017-06-14 LAB
ALBUMIN SERPL ELPH-MCNC: 3.2 G/DL — LOW (ref 3.3–5.2)
ALP SERPL-CCNC: 422 U/L — HIGH (ref 40–120)
ALT FLD-CCNC: 20 U/L — SIGNIFICANT CHANGE UP
AMMONIA BLD-MCNC: 38 UMOL/L — SIGNIFICANT CHANGE UP (ref 11–55)
ANION GAP SERPL CALC-SCNC: 13 MMOL/L — SIGNIFICANT CHANGE UP (ref 5–17)
APTT BLD: 29.6 SEC — SIGNIFICANT CHANGE UP (ref 27.5–37.4)
AST SERPL-CCNC: 26 U/L — SIGNIFICANT CHANGE UP
BILIRUB DIRECT SERPL-MCNC: 0.7 MG/DL — HIGH (ref 0–0.3)
BILIRUB INDIRECT FLD-MCNC: 0.7 MG/DL — SIGNIFICANT CHANGE UP (ref 0.2–1)
BILIRUB SERPL-MCNC: 1.4 MG/DL — SIGNIFICANT CHANGE UP (ref 0.4–2)
BILIRUB SERPL-MCNC: 1.4 MG/DL — SIGNIFICANT CHANGE UP (ref 0.4–2)
BUN SERPL-MCNC: 5 MG/DL — LOW (ref 8–20)
CALCIUM SERPL-MCNC: 8.8 MG/DL — SIGNIFICANT CHANGE UP (ref 8.6–10.2)
CHLORIDE SERPL-SCNC: 92 MMOL/L — LOW (ref 98–107)
CO2 SERPL-SCNC: 28 MMOL/L — SIGNIFICANT CHANGE UP (ref 22–29)
CREAT SERPL-MCNC: 0.43 MG/DL — LOW (ref 0.5–1.3)
EOSINOPHIL # BLD AUTO: 0 K/UL — SIGNIFICANT CHANGE UP (ref 0–0.5)
EOSINOPHIL NFR BLD AUTO: 0.4 % — SIGNIFICANT CHANGE UP (ref 0–5)
GLUCOSE SERPL-MCNC: 300 MG/DL — HIGH (ref 70–115)
HCT VFR BLD CALC: 25.3 % — LOW (ref 42–52)
HGB BLD-MCNC: 9 G/DL — LOW (ref 14–18)
INR BLD: 1.69 RATIO — HIGH (ref 0.88–1.16)
LACTATE BLDV-MCNC: 2.5 MMOL/L — HIGH (ref 0.5–2)
LIDOCAIN IGE QN: 9 U/L — LOW (ref 22–51)
LYMPHOCYTES # BLD AUTO: 0.8 K/UL — LOW (ref 1–4.8)
LYMPHOCYTES # BLD AUTO: 32.7 % — SIGNIFICANT CHANGE UP (ref 20–55)
MCHC RBC-ENTMCNC: 29.2 PG — SIGNIFICANT CHANGE UP (ref 27–31)
MCHC RBC-ENTMCNC: 35.6 G/DL — SIGNIFICANT CHANGE UP (ref 32–36)
MCV RBC AUTO: 82.1 FL — SIGNIFICANT CHANGE UP (ref 80–94)
MONOCYTES # BLD AUTO: 0.1 K/UL — SIGNIFICANT CHANGE UP (ref 0–0.8)
MONOCYTES NFR BLD AUTO: 3.1 % — SIGNIFICANT CHANGE UP (ref 3–10)
NEUTROPHILS # BLD AUTO: 1.6 K/UL — LOW (ref 1.8–8)
NEUTROPHILS NFR BLD AUTO: 63.4 % — SIGNIFICANT CHANGE UP (ref 37–73)
PLATELET # BLD AUTO: 224 K/UL — SIGNIFICANT CHANGE UP (ref 150–400)
POTASSIUM SERPL-MCNC: 4.5 MMOL/L — SIGNIFICANT CHANGE UP (ref 3.5–5.3)
POTASSIUM SERPL-SCNC: 4.5 MMOL/L — SIGNIFICANT CHANGE UP (ref 3.5–5.3)
PROT SERPL-MCNC: 6.6 G/DL — SIGNIFICANT CHANGE UP (ref 6.6–8.7)
PROTHROM AB SERPL-ACNC: 18.8 SEC — HIGH (ref 9.8–12.7)
RBC # BLD: 3.08 M/UL — LOW (ref 4.6–6.2)
RBC # FLD: 18.1 % — HIGH (ref 11–15.6)
SODIUM SERPL-SCNC: 133 MMOL/L — LOW (ref 135–145)
WBC # BLD: 2.6 K/UL — LOW (ref 4.8–10.8)
WBC # FLD AUTO: 2.6 K/UL — LOW (ref 4.8–10.8)

## 2017-06-14 PROCEDURE — 99285 EMERGENCY DEPT VISIT HI MDM: CPT

## 2017-06-14 PROCEDURE — 93010 ELECTROCARDIOGRAM REPORT: CPT

## 2017-06-14 RX ORDER — INSULIN GLARGINE 100 [IU]/ML
10 INJECTION, SOLUTION SUBCUTANEOUS AT BEDTIME
Qty: 0 | Refills: 0 | Status: DISCONTINUED | OUTPATIENT
Start: 2017-06-14 | End: 2017-06-15

## 2017-06-14 RX ORDER — DEXTROSE 50 % IN WATER 50 %
12.5 SYRINGE (ML) INTRAVENOUS ONCE
Qty: 0 | Refills: 0 | Status: DISCONTINUED | OUTPATIENT
Start: 2017-06-14 | End: 2017-06-15

## 2017-06-14 RX ORDER — DEXTROSE 50 % IN WATER 50 %
25 SYRINGE (ML) INTRAVENOUS ONCE
Qty: 0 | Refills: 0 | Status: DISCONTINUED | OUTPATIENT
Start: 2017-06-14 | End: 2017-06-15

## 2017-06-14 RX ORDER — SODIUM CHLORIDE 9 MG/ML
1000 INJECTION INTRAMUSCULAR; INTRAVENOUS; SUBCUTANEOUS ONCE
Qty: 0 | Refills: 0 | Status: COMPLETED | OUTPATIENT
Start: 2017-06-14 | End: 2017-06-14

## 2017-06-14 RX ORDER — INSULIN LISPRO 100/ML
VIAL (ML) SUBCUTANEOUS
Qty: 0 | Refills: 0 | Status: DISCONTINUED | OUTPATIENT
Start: 2017-06-14 | End: 2017-06-15

## 2017-06-14 RX ORDER — INSULIN LISPRO 100/ML
VIAL (ML) SUBCUTANEOUS AT BEDTIME
Qty: 0 | Refills: 0 | Status: DISCONTINUED | OUTPATIENT
Start: 2017-06-14 | End: 2017-06-15

## 2017-06-14 RX ORDER — SODIUM CHLORIDE 9 MG/ML
1000 INJECTION, SOLUTION INTRAVENOUS
Qty: 0 | Refills: 0 | Status: DISCONTINUED | OUTPATIENT
Start: 2017-06-14 | End: 2017-06-15

## 2017-06-14 RX ORDER — DEXTROSE 50 % IN WATER 50 %
1 SYRINGE (ML) INTRAVENOUS ONCE
Qty: 0 | Refills: 0 | Status: DISCONTINUED | OUTPATIENT
Start: 2017-06-14 | End: 2017-06-15

## 2017-06-14 RX ORDER — HEPARIN SODIUM 5000 [USP'U]/ML
5000 INJECTION INTRAVENOUS; SUBCUTANEOUS EVERY 12 HOURS
Qty: 0 | Refills: 0 | Status: DISCONTINUED | OUTPATIENT
Start: 2017-06-14 | End: 2017-06-15

## 2017-06-14 RX ORDER — PIPERACILLIN AND TAZOBACTAM 4; .5 G/20ML; G/20ML
3.38 INJECTION, POWDER, LYOPHILIZED, FOR SOLUTION INTRAVENOUS EVERY 8 HOURS
Qty: 0 | Refills: 0 | Status: DISCONTINUED | OUTPATIENT
Start: 2017-06-14 | End: 2017-06-15

## 2017-06-14 RX ORDER — GLUCAGON INJECTION, SOLUTION 0.5 MG/.1ML
1 INJECTION, SOLUTION SUBCUTANEOUS ONCE
Qty: 0 | Refills: 0 | Status: DISCONTINUED | OUTPATIENT
Start: 2017-06-14 | End: 2017-06-15

## 2017-06-14 RX ORDER — PANTOPRAZOLE SODIUM 20 MG/1
40 TABLET, DELAYED RELEASE ORAL
Qty: 0 | Refills: 0 | Status: DISCONTINUED | OUTPATIENT
Start: 2017-06-14 | End: 2017-06-15

## 2017-06-14 RX ORDER — INSULIN LISPRO 100/ML
2 VIAL (ML) SUBCUTANEOUS
Qty: 0 | Refills: 0 | Status: DISCONTINUED | OUTPATIENT
Start: 2017-06-14 | End: 2017-06-15

## 2017-06-14 RX ORDER — AMLODIPINE BESYLATE 2.5 MG/1
5 TABLET ORAL DAILY
Qty: 0 | Refills: 0 | Status: DISCONTINUED | OUTPATIENT
Start: 2017-06-14 | End: 2017-06-15

## 2017-06-14 RX ADMIN — Medication 2 UNIT(S): at 17:26

## 2017-06-14 RX ADMIN — SODIUM CHLORIDE 1000 MILLILITER(S): 9 INJECTION INTRAMUSCULAR; INTRAVENOUS; SUBCUTANEOUS at 17:01

## 2017-06-14 RX ADMIN — Medication: at 17:25

## 2017-06-14 RX ADMIN — PIPERACILLIN AND TAZOBACTAM 25 GRAM(S): 4; .5 INJECTION, POWDER, LYOPHILIZED, FOR SOLUTION INTRAVENOUS at 22:42

## 2017-06-14 RX ADMIN — INSULIN GLARGINE 10 UNIT(S): 100 INJECTION, SOLUTION SUBCUTANEOUS at 22:42

## 2017-06-14 RX ADMIN — HEPARIN SODIUM 5000 UNIT(S): 5000 INJECTION INTRAVENOUS; SUBCUTANEOUS at 17:26

## 2017-06-14 NOTE — ED STATDOCS - PROGRESS NOTE DETAILS
58 y/o M w/ PMHx of CA presents to the ED c/o a leaking abd drain and pain onset today. Pt had sx here for cancer? Family states it may be stomach, maybe pancreas, and maybe intestine CA. Pt had drain left in and now the drain is leaking from the skin site. Pt also has pain at the drain. Drain is leaking at the incision site and the drain is still draining. Pt will be sent to [facility] for further evaluation by another provider.
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
## 2017-06-14 NOTE — ED ADULT NURSE NOTE - OBJECTIVE STATEMENT
pt presents to ED with yellow drainage from site of drain, draining from pancreas. afebrile. pt is A/Ox3.

## 2017-06-14 NOTE — ED ADULT NURSE REASSESSMENT NOTE - NS ED NURSE REASSESS COMMENT FT1
Report received from off going RN, charting as noted. Patient A&Ox4, denies any pain or discomfort. Respirations even & unlabored, denies any numbness or tingling. IV site C/D/I, patent, negative s/s phlebitis or infiltration. IVF in progress, well tolerated. Will continue to monitor. Report received from off going RN, charting as noted. Patient A&Ox4, denies any pain or discomfort. Respirations even & unlabored, denies any numbness or tingling. IV site C/D/I, patent, negative s/s phlebitis or infiltration. FREDI in place to right side, draining. Will continue to monitor.
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
## 2017-06-14 NOTE — ED PROVIDER NOTE - PROGRESS NOTE DETAILS
pt with blocked transhepatic drainage tube to be admitted for intervention and started on I antibiotics

## 2017-06-14 NOTE — ED STATDOCS - NS ED MD ATTENDING STATEMENT
I have personally performed a face to face diagnostic evaluation on this patient. I have reviewed the ACP note and agree with the history, exam and plan of care, except as noted. Attending Only

## 2017-06-15 VITALS
RESPIRATION RATE: 16 BRPM | HEART RATE: 85 BPM | TEMPERATURE: 99 F | SYSTOLIC BLOOD PRESSURE: 129 MMHG | DIASTOLIC BLOOD PRESSURE: 84 MMHG | OXYGEN SATURATION: 99 %

## 2017-06-15 LAB
ANION GAP SERPL CALC-SCNC: 11 MMOL/L — SIGNIFICANT CHANGE UP (ref 5–17)
BUN SERPL-MCNC: 4 MG/DL — LOW (ref 8–20)
CALCIUM SERPL-MCNC: 8.9 MG/DL — SIGNIFICANT CHANGE UP (ref 8.6–10.2)
CHLORIDE SERPL-SCNC: 96 MMOL/L — LOW (ref 98–107)
CO2 SERPL-SCNC: 30 MMOL/L — HIGH (ref 22–29)
CREAT SERPL-MCNC: 0.59 MG/DL — SIGNIFICANT CHANGE UP (ref 0.5–1.3)
GLUCOSE SERPL-MCNC: 191 MG/DL — HIGH (ref 70–115)
HCT VFR BLD CALC: 25 % — LOW (ref 42–52)
HGB BLD-MCNC: 8.8 G/DL — LOW (ref 14–18)
MAGNESIUM SERPL-MCNC: 1.6 MG/DL — SIGNIFICANT CHANGE UP (ref 1.6–2.6)
MCHC RBC-ENTMCNC: 28.9 PG — SIGNIFICANT CHANGE UP (ref 27–31)
MCHC RBC-ENTMCNC: 35.2 G/DL — SIGNIFICANT CHANGE UP (ref 32–36)
MCV RBC AUTO: 82 FL — SIGNIFICANT CHANGE UP (ref 80–94)
PHOSPHATE SERPL-MCNC: 1.8 MG/DL — LOW (ref 2.4–4.7)
PLATELET # BLD AUTO: 268 K/UL — SIGNIFICANT CHANGE UP (ref 150–400)
POTASSIUM SERPL-MCNC: 4 MMOL/L — SIGNIFICANT CHANGE UP (ref 3.5–5.3)
POTASSIUM SERPL-SCNC: 4 MMOL/L — SIGNIFICANT CHANGE UP (ref 3.5–5.3)
RBC # BLD: 3.05 M/UL — LOW (ref 4.6–6.2)
RBC # FLD: 16.9 % — HIGH (ref 11–15.6)
SODIUM SERPL-SCNC: 137 MMOL/L — SIGNIFICANT CHANGE UP (ref 135–145)
WBC # BLD: 4 K/UL — LOW (ref 4.8–10.8)
WBC # FLD AUTO: 4 K/UL — LOW (ref 4.8–10.8)

## 2017-06-15 RX ORDER — INSULIN GLARGINE 100 [IU]/ML
20 INJECTION, SOLUTION SUBCUTANEOUS
Qty: 0 | Refills: 0 | COMMUNITY

## 2017-06-15 RX ORDER — INSULIN GLARGINE 100 [IU]/ML
10 INJECTION, SOLUTION SUBCUTANEOUS
Qty: 0 | Refills: 0 | COMMUNITY
Start: 2017-06-15

## 2017-06-15 RX ADMIN — HEPARIN SODIUM 5000 UNIT(S): 5000 INJECTION INTRAVENOUS; SUBCUTANEOUS at 05:11

## 2017-06-15 RX ADMIN — PIPERACILLIN AND TAZOBACTAM 25 GRAM(S): 4; .5 INJECTION, POWDER, LYOPHILIZED, FOR SOLUTION INTRAVENOUS at 05:11

## 2017-06-15 RX ADMIN — AMLODIPINE BESYLATE 5 MILLIGRAM(S): 2.5 TABLET ORAL at 05:11

## 2017-06-15 RX ADMIN — PANTOPRAZOLE SODIUM 40 MILLIGRAM(S): 20 TABLET, DELAYED RELEASE ORAL at 05:11

## 2017-06-15 NOTE — DISCHARGE NOTE ADULT - CARE PLAN
Principal Discharge DX:	Drainage from wound  Goal:	Resolve the infection  Instructions for follow-up, activity and diet:	Follow up with Dr James Garcia  Secondary Diagnosis:	Type 2 diabetes mellitus with other neurologic complication  Instructions for follow-up, activity and diet:	Monitor glucose levels  Secondary Diagnosis:	Duodenal adenocarcinoma  Instructions for follow-up, activity and diet:	Follow up with Oncology Dr Cruz  Secondary Diagnosis:	Essential hypertension  Instructions for follow-up, activity and diet:	Monitor BP.

## 2017-06-15 NOTE — DISCHARGE NOTE ADULT - SECONDARY DIAGNOSIS.
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

## 2017-06-15 NOTE — DISCHARGE NOTE ADULT - MEDICATION SUMMARY - MEDICATIONS TO TAKE
I will START or STAY ON the medications listed below when I get home from the hospital:    insulin glargine  -- 10 unit(s) subcutaneous once a day (at bedtime)  -- Indication: For Diabetes    amlodipine 5 mg oral tablet  -- 1 tab(s) by mouth once a day in pm  -- Indication: For HTN    magnesium oxide 400 mg (241.3 mg elemental magnesium) oral tablet  -- 1 tab(s) by mouth 3 times a day (with meals)  -- Indication: For Hypomagnesemia    Augmentin 875 mg-125 mg oral tablet  -- 1 tab(s) by mouth every 12 hours  -- Finish all this medication unless otherwise directed by prescriber.  Take with food or milk.    -- Indication: For Infected abdominal drain    pantoprazole 40 mg oral delayed release tablet  -- 1 tab(s) by mouth 2 times a day (before meals)  -- Indication: For GERD

## 2017-06-15 NOTE — DISCHARGE NOTE ADULT - MEDICATION SUMMARY - MEDICATIONS TO STOP TAKING
I will STOP taking the medications listed below when I get home from the hospital:    Zosyn 3 g-0.375 g/50 mL intravenous solution  -- 3.375 gram(s) intravenous every 6 hours seekly cbc and cmp  through may 20th

## 2017-06-15 NOTE — DISCHARGE NOTE ADULT - PATIENT PORTAL LINK FT
“You can access the [facility] Patient Portal, offered by [facility], by registering with the following website: [email or web address]”

## 2017-06-15 NOTE — PROGRESS NOTE ADULT - SUBJECTIVE AND OBJECTIVE BOX
called because biliary drain was leaking.   Drain was flushed with NS and new bag was placed.      No leakage over several hours. Patient denies pain, fever, chills.      May D/C home now.
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
## 2017-06-15 NOTE — DISCHARGE NOTE ADULT - PLAN OF CARE
Resolve the infection Follow up with Dr James Garcia Monitor glucose levels Follow up with Oncology Dr Cruz Monitor BP.

## 2017-06-17 PROCEDURE — 83605 ASSAY OF LACTIC ACID: CPT

## 2017-06-17 PROCEDURE — 84100 ASSAY OF PHOSPHORUS: CPT

## 2017-06-17 PROCEDURE — 87040 BLOOD CULTURE FOR BACTERIA: CPT

## 2017-06-17 PROCEDURE — T1013: CPT

## 2017-06-17 PROCEDURE — 82248 BILIRUBIN DIRECT: CPT

## 2017-06-17 PROCEDURE — 96374 THER/PROPH/DIAG INJ IV PUSH: CPT

## 2017-06-17 PROCEDURE — 85027 COMPLETE CBC AUTOMATED: CPT

## 2017-06-17 PROCEDURE — 80048 BASIC METABOLIC PNL TOTAL CA: CPT

## 2017-06-17 PROCEDURE — 85730 THROMBOPLASTIN TIME PARTIAL: CPT

## 2017-06-17 PROCEDURE — 93005 ELECTROCARDIOGRAM TRACING: CPT

## 2017-06-17 PROCEDURE — 83735 ASSAY OF MAGNESIUM: CPT

## 2017-06-17 PROCEDURE — 80053 COMPREHEN METABOLIC PANEL: CPT

## 2017-06-17 PROCEDURE — 99285 EMERGENCY DEPT VISIT HI MDM: CPT | Mod: 25

## 2017-06-17 PROCEDURE — 83690 ASSAY OF LIPASE: CPT

## 2017-06-17 PROCEDURE — 85610 PROTHROMBIN TIME: CPT

## 2017-06-17 PROCEDURE — 96372 THER/PROPH/DIAG INJ SC/IM: CPT | Mod: XU

## 2017-06-17 PROCEDURE — 82140 ASSAY OF AMMONIA: CPT

## 2017-06-19 LAB
CULTURE RESULTS: SIGNIFICANT CHANGE UP
CULTURE RESULTS: SIGNIFICANT CHANGE UP
SPECIMEN SOURCE: SIGNIFICANT CHANGE UP
SPECIMEN SOURCE: SIGNIFICANT CHANGE UP

## 2017-06-20 ENCOUNTER — RESULT REVIEW (OUTPATIENT)
Age: 57
End: 2017-06-20

## 2017-06-20 ENCOUNTER — APPOINTMENT (OUTPATIENT)
Dept: HEMATOLOGY ONCOLOGY | Facility: CLINIC | Age: 57
End: 2017-06-20

## 2017-06-20 ENCOUNTER — OUTPATIENT (OUTPATIENT)
Dept: OUTPATIENT SERVICES | Facility: HOSPITAL | Age: 57
LOS: 1 days | Discharge: HOME | End: 2017-06-20

## 2017-06-20 VITALS
HEART RATE: 97 BPM | DIASTOLIC BLOOD PRESSURE: 72 MMHG | OXYGEN SATURATION: 100 % | SYSTOLIC BLOOD PRESSURE: 111 MMHG | RESPIRATION RATE: 16 BRPM | WEIGHT: 129.96 LBS | TEMPERATURE: 98 F | BODY MASS INDEX: 21.63 KG/M2

## 2017-06-20 DIAGNOSIS — K59.00 CONSTIPATION, UNSPECIFIED: ICD-10-CM

## 2017-06-20 DIAGNOSIS — Z96.89 PRESENCE OF OTHER SPECIFIED FUNCTIONAL IMPLANTS: Chronic | ICD-10-CM

## 2017-06-20 DIAGNOSIS — R11.2 NAUSEA WITH VOMITING, UNSPECIFIED: ICD-10-CM

## 2017-06-20 DIAGNOSIS — C25.9 MALIGNANT NEOPLASM OF PANCREAS, UNSPECIFIED: Chronic | ICD-10-CM

## 2017-06-20 DIAGNOSIS — C17.0 MALIGNANT NEOPLASM OF DUODENUM: ICD-10-CM

## 2017-06-20 DIAGNOSIS — Z98.890 OTHER SPECIFIED POSTPROCEDURAL STATES: Chronic | ICD-10-CM

## 2017-06-20 DIAGNOSIS — T45.1X5A NAUSEA WITH VOMITING, UNSPECIFIED: ICD-10-CM

## 2017-06-20 LAB
BASOPHILS # BLD AUTO: 0 K/UL — SIGNIFICANT CHANGE UP (ref 0–0.2)
BASOPHILS NFR BLD AUTO: 0.6 % — SIGNIFICANT CHANGE UP (ref 0–2)
EOSINOPHIL # BLD AUTO: 0.1 K/UL — SIGNIFICANT CHANGE UP (ref 0–0.5)
EOSINOPHIL NFR BLD AUTO: 1.3 % — SIGNIFICANT CHANGE UP (ref 0–6)
HCT VFR BLD CALC: 24.8 % — LOW (ref 39–50)
HGB BLD-MCNC: 8.4 G/DL — LOW (ref 13–17)
LYMPHOCYTES # BLD AUTO: 1.1 K/UL — SIGNIFICANT CHANGE UP (ref 1–3.3)
LYMPHOCYTES # BLD AUTO: 22.4 % — SIGNIFICANT CHANGE UP (ref 13–44)
MCHC RBC-ENTMCNC: 27.9 PG — SIGNIFICANT CHANGE UP (ref 27–34)
MCHC RBC-ENTMCNC: 33.8 GM/DL — SIGNIFICANT CHANGE UP (ref 32–36)
MCV RBC AUTO: 82.4 FL — SIGNIFICANT CHANGE UP (ref 80–100)
MONOCYTES # BLD AUTO: 0.3 K/UL — SIGNIFICANT CHANGE UP (ref 0–0.9)
MONOCYTES NFR BLD AUTO: 5.6 % — SIGNIFICANT CHANGE UP (ref 2–14)
NEUTROPHILS # BLD AUTO: 3.5 K/UL — SIGNIFICANT CHANGE UP (ref 1.8–7.4)
NEUTROPHILS NFR BLD AUTO: 70.1 % — SIGNIFICANT CHANGE UP (ref 43–77)
PLATELET # BLD AUTO: 229 K/UL — SIGNIFICANT CHANGE UP (ref 150–400)
RBC # BLD: 3.01 M/UL — LOW (ref 4.2–5.8)
RBC # FLD: 15.1 % — HIGH (ref 10.3–14.5)
WBC # BLD: 5 K/UL — SIGNIFICANT CHANGE UP (ref 3.8–10.5)
WBC # FLD AUTO: 5 K/UL — SIGNIFICANT CHANGE UP (ref 3.8–10.5)

## 2017-06-21 ENCOUNTER — EMERGENCY (EMERGENCY)
Facility: HOSPITAL | Age: 57
LOS: 1 days | Discharge: HOME | End: 2017-06-21
Attending: EMERGENCY MEDICINE
Payer: MEDICAID

## 2017-06-21 VITALS
TEMPERATURE: 97 F | RESPIRATION RATE: 16 BRPM | SYSTOLIC BLOOD PRESSURE: 128 MMHG | OXYGEN SATURATION: 99 % | HEART RATE: 84 BPM | DIASTOLIC BLOOD PRESSURE: 74 MMHG

## 2017-06-21 VITALS
SYSTOLIC BLOOD PRESSURE: 150 MMHG | DIASTOLIC BLOOD PRESSURE: 85 MMHG | HEART RATE: 107 BPM | OXYGEN SATURATION: 98 % | WEIGHT: 130.07 LBS | HEIGHT: 65 IN | TEMPERATURE: 97 F | RESPIRATION RATE: 18 BRPM

## 2017-06-21 DIAGNOSIS — Z98.890 OTHER SPECIFIED POSTPROCEDURAL STATES: Chronic | ICD-10-CM

## 2017-06-21 DIAGNOSIS — Z96.89 PRESENCE OF OTHER SPECIFIED FUNCTIONAL IMPLANTS: Chronic | ICD-10-CM

## 2017-06-21 DIAGNOSIS — C25.9 MALIGNANT NEOPLASM OF PANCREAS, UNSPECIFIED: Chronic | ICD-10-CM

## 2017-06-21 PROCEDURE — 99283 EMERGENCY DEPT VISIT LOW MDM: CPT

## 2017-06-21 PROCEDURE — T1013: CPT

## 2017-06-21 PROCEDURE — 99283 EMERGENCY DEPT VISIT LOW MDM: CPT | Mod: 25

## 2017-06-21 RX ORDER — TAMSULOSIN HYDROCHLORIDE 0.4 MG/1
1 CAPSULE ORAL
Qty: 7 | Refills: 0 | OUTPATIENT
Start: 2017-06-21 | End: 2017-06-28

## 2017-06-21 RX ORDER — TAMSULOSIN HYDROCHLORIDE 0.4 MG/1
0.4 CAPSULE ORAL ONCE
Qty: 0 | Refills: 0 | Status: COMPLETED | OUTPATIENT
Start: 2017-06-21 | End: 2017-06-21

## 2017-06-21 RX ADMIN — TAMSULOSIN HYDROCHLORIDE 0.4 MILLIGRAM(S): 0.4 CAPSULE ORAL at 09:54

## 2017-06-21 RX ADMIN — Medication 1 ENEMA: at 08:35

## 2017-06-21 NOTE — ED PROVIDER NOTE - PROGRESS NOTE DETAILS
patient reassessed s/p jiménez and enema; had large BM, had >1500ml urine in jiménez; feels much improved. Will d/c with flomax and jiménez to leg bag, has a pvt urologist in [place] he can f/u with
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
## 2017-06-21 NOTE — ED ADULT NURSE NOTE - OBJECTIVE STATEMENT
Pt states he has not voided since yesterday and no BM since Monday.  Pt has been taking pain meds for the two drains in his pancreas, draining yellow fluid.  No other complaints at this time. Pt states he has not voided since yesterday and no BM since Monday.  Pt has been taking pain meds for the two drains in his pancreas, draining yellow fluid.  No other complaints at this time.  Dr. Smith placed jiménez 1600mls of clear yellow urine out.

## 2017-06-21 NOTE — ED ADULT TRIAGE NOTE - CHIEF COMPLAINT QUOTE
patient states that he is unable to urinate which started last night. Patient has severe abdominal pain, has a pancreas drainage tube, patient has a history of intestinal cancer
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
## 2017-06-21 NOTE — ED PROVIDER NOTE - OBJECTIVE STATEMENT
58 yo male currently under treatment for "small bowel cancer," s/p surg and chemo; presents with 2 days of difficulty urinating and no BM x 2 days; presents to ED with lower abd pain, distention, moderate, non radiating, aching, "fullness," assoc with "I can't pee."

## 2017-06-22 ENCOUNTER — LABORATORY RESULT (OUTPATIENT)
Age: 57
End: 2017-06-22

## 2017-06-22 ENCOUNTER — APPOINTMENT (OUTPATIENT)
Dept: INFUSION THERAPY | Facility: CLINIC | Age: 57
End: 2017-06-22

## 2017-06-22 RX ORDER — AMLODIPINE BESYLATE 2.5 MG/1
1 TABLET ORAL
Qty: 0 | Refills: 0 | COMMUNITY

## 2017-06-26 DIAGNOSIS — Z51.11 ENCOUNTER FOR ANTINEOPLASTIC CHEMOTHERAPY: ICD-10-CM

## 2017-06-26 DIAGNOSIS — R11.2 NAUSEA WITH VOMITING, UNSPECIFIED: ICD-10-CM

## 2017-07-05 ENCOUNTER — EMERGENCY (EMERGENCY)
Facility: HOSPITAL | Age: 57
LOS: 1 days | Discharge: LEFT BEFORE DISCHARGE | End: 2017-07-05
Admitting: EMERGENCY MEDICINE
Payer: MEDICAID

## 2017-07-05 VITALS
RESPIRATION RATE: 20 BRPM | SYSTOLIC BLOOD PRESSURE: 108 MMHG | OXYGEN SATURATION: 99 % | TEMPERATURE: 99 F | HEART RATE: 112 BPM | DIASTOLIC BLOOD PRESSURE: 68 MMHG

## 2017-07-05 VITALS — HEIGHT: 66 IN | WEIGHT: 130.07 LBS

## 2017-07-05 DIAGNOSIS — Z96.89 PRESENCE OF OTHER SPECIFIED FUNCTIONAL IMPLANTS: Chronic | ICD-10-CM

## 2017-07-05 DIAGNOSIS — Z98.890 OTHER SPECIFIED POSTPROCEDURAL STATES: Chronic | ICD-10-CM

## 2017-07-05 DIAGNOSIS — C25.9 MALIGNANT NEOPLASM OF PANCREAS, UNSPECIFIED: Chronic | ICD-10-CM

## 2017-07-05 PROCEDURE — T1013: CPT

## 2017-07-05 RX ORDER — SODIUM CHLORIDE 9 MG/ML
500 INJECTION INTRAMUSCULAR; INTRAVENOUS; SUBCUTANEOUS ONCE
Qty: 0 | Refills: 0 | Status: DISCONTINUED | OUTPATIENT
Start: 2017-07-05 | End: 2017-07-09

## 2017-07-05 NOTE — ED STATDOCS - OBJECTIVE STATEMENT
58 y/o M w/ PMHx of HTN, HLD, and DM presents to the ED c/o colic pain, incontinence, and decreased drain output x15 days. Pt denies n/v, fever or any other complaints at this time.   : Vero
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
## 2017-07-05 NOTE — ED ADULT TRIAGE NOTE - CHIEF COMPLAINT QUOTE
"My drain is not draining.' Pt states he has liquid in his pancreas. Pt states Dr is Charli. Pt has covered wound on right side of abdomen, draining dark yellow fluid. Pt denies fever/chills.

## 2017-07-05 NOTE — ED STATDOCS - PROGRESS NOTE DETAILS
58 y/o M w/ PMHx of HTN, HLD, DM, duodenal adenocarcinoma, and malignant pancreatic CA w/ PSHx of biliary stents 2 months ago presents to the ED c/o colic pain, incontinence, and decreased biliary drain output x15 days. Pt denies n/v, fever or any other complaints at this time. Pt will be sent to [facility] for further evaluation by another provider.   : Vero pt states that he already has an appointment with his doctor tommorrow and does not want to wait and walked out without signing

## 2017-07-06 ENCOUNTER — LABORATORY RESULT (OUTPATIENT)
Age: 57
End: 2017-07-06

## 2017-07-06 ENCOUNTER — RESULT REVIEW (OUTPATIENT)
Age: 57
End: 2017-07-06

## 2017-07-06 ENCOUNTER — APPOINTMENT (OUTPATIENT)
Dept: INFUSION THERAPY | Facility: CLINIC | Age: 57
End: 2017-07-06

## 2017-07-06 LAB
BASOPHILS # BLD AUTO: 0.1 K/UL — SIGNIFICANT CHANGE UP (ref 0–0.2)
BASOPHILS NFR BLD AUTO: 1.8 % — SIGNIFICANT CHANGE UP (ref 0–2)
EOSINOPHIL # BLD AUTO: 0 K/UL — SIGNIFICANT CHANGE UP (ref 0–0.5)
EOSINOPHIL NFR BLD AUTO: 0.3 % — SIGNIFICANT CHANGE UP (ref 0–6)
HCT VFR BLD CALC: 27.9 % — LOW (ref 39–50)
HGB BLD-MCNC: 9.6 G/DL — LOW (ref 13–17)
LYMPHOCYTES # BLD AUTO: 2.2 K/UL — SIGNIFICANT CHANGE UP (ref 1–3.3)
LYMPHOCYTES # BLD AUTO: 45.4 % — HIGH (ref 13–44)
MACROCYTES BLD QL: SLIGHT — SIGNIFICANT CHANGE UP
MANUAL DIF COMMENT BLD-IMP: SIGNIFICANT CHANGE UP
MCHC RBC-ENTMCNC: 29.8 PG — SIGNIFICANT CHANGE UP (ref 27–34)
MCHC RBC-ENTMCNC: 34.6 GM/DL — SIGNIFICANT CHANGE UP (ref 32–36)
MCV RBC AUTO: 86.3 FL — SIGNIFICANT CHANGE UP (ref 80–100)
MICROCYTES BLD QL: SLIGHT — SIGNIFICANT CHANGE UP
MONOCYTES # BLD AUTO: 0.9 K/UL — SIGNIFICANT CHANGE UP (ref 0–0.9)
MONOCYTES NFR BLD AUTO: 19.6 % — HIGH (ref 2–14)
NEUTROPHILS # BLD AUTO: 1.6 K/UL — LOW (ref 1.8–7.4)
NEUTROPHILS NFR BLD AUTO: 32.9 % — LOW (ref 43–77)
PLAT MORPH BLD: NORMAL — SIGNIFICANT CHANGE UP
PLATELET # BLD AUTO: 223 K/UL — SIGNIFICANT CHANGE UP (ref 150–400)
POIKILOCYTOSIS BLD QL AUTO: SLIGHT — SIGNIFICANT CHANGE UP
POLYCHROMASIA BLD QL SMEAR: SLIGHT — SIGNIFICANT CHANGE UP
RBC # BLD: 3.23 M/UL — LOW (ref 4.2–5.8)
RBC # FLD: 17.6 % — HIGH (ref 10.3–14.5)
RBC BLD AUTO: ABNORMAL
WBC # BLD: 4.7 K/UL — SIGNIFICANT CHANGE UP (ref 3.8–10.5)
WBC # FLD AUTO: 4.7 K/UL — SIGNIFICANT CHANGE UP (ref 3.8–10.5)

## 2017-07-18 ENCOUNTER — OUTPATIENT (OUTPATIENT)
Dept: OUTPATIENT SERVICES | Facility: HOSPITAL | Age: 57
LOS: 1 days | Discharge: HOME | End: 2017-07-18
Payer: MEDICAID

## 2017-07-18 DIAGNOSIS — Z98.890 OTHER SPECIFIED POSTPROCEDURAL STATES: Chronic | ICD-10-CM

## 2017-07-18 DIAGNOSIS — Z96.89 PRESENCE OF OTHER SPECIFIED FUNCTIONAL IMPLANTS: Chronic | ICD-10-CM

## 2017-07-18 DIAGNOSIS — C25.9 MALIGNANT NEOPLASM OF PANCREAS, UNSPECIFIED: Chronic | ICD-10-CM

## 2017-07-18 DIAGNOSIS — C17.0 MALIGNANT NEOPLASM OF DUODENUM: ICD-10-CM

## 2017-07-19 ENCOUNTER — RESULT REVIEW (OUTPATIENT)
Age: 57
End: 2017-07-19

## 2017-07-19 ENCOUNTER — APPOINTMENT (OUTPATIENT)
Dept: INFUSION THERAPY | Facility: CLINIC | Age: 57
End: 2017-07-19

## 2017-07-19 ENCOUNTER — APPOINTMENT (OUTPATIENT)
Dept: HEMATOLOGY ONCOLOGY | Facility: CLINIC | Age: 57
End: 2017-07-19

## 2017-07-19 ENCOUNTER — APPOINTMENT (OUTPATIENT)
Dept: SURGICAL ONCOLOGY | Facility: CLINIC | Age: 57
End: 2017-07-19

## 2017-07-19 VITALS
DIASTOLIC BLOOD PRESSURE: 70 MMHG | OXYGEN SATURATION: 98 % | TEMPERATURE: 98 F | WEIGHT: 124.01 LBS | RESPIRATION RATE: 14 BRPM | SYSTOLIC BLOOD PRESSURE: 110 MMHG | HEART RATE: 90 BPM | BODY MASS INDEX: 20.64 KG/M2

## 2017-07-19 VITALS
HEART RATE: 90 BPM | DIASTOLIC BLOOD PRESSURE: 70 MMHG | TEMPERATURE: 98 F | SYSTOLIC BLOOD PRESSURE: 110 MMHG | WEIGHT: 124 LBS | OXYGEN SATURATION: 98 % | HEIGHT: 65 IN | RESPIRATION RATE: 12 BRPM | BODY MASS INDEX: 20.66 KG/M2

## 2017-07-19 LAB
ANISOCYTOSIS BLD QL: SLIGHT — SIGNIFICANT CHANGE UP
BASOPHILS # BLD AUTO: 0 K/UL — SIGNIFICANT CHANGE UP (ref 0–0.2)
DOHLE BOD BLD QL SMEAR: PRESENT — SIGNIFICANT CHANGE UP
EOSINOPHIL # BLD AUTO: 0 K/UL — SIGNIFICANT CHANGE UP (ref 0–0.5)
EOSINOPHIL NFR BLD AUTO: 1 % — SIGNIFICANT CHANGE UP (ref 0–6)
HCT VFR BLD CALC: 25.6 % — LOW (ref 39–50)
HGB BLD-MCNC: 8.8 G/DL — LOW (ref 13–17)
LG PLATELETS BLD QL AUTO: SLIGHT — SIGNIFICANT CHANGE UP
LYMPHOCYTES # BLD AUTO: 0.7 K/UL — LOW (ref 1–3.3)
LYMPHOCYTES # BLD AUTO: 67 % — HIGH (ref 13–44)
LYMPHOCYTES # SPEC AUTO: 1 % — HIGH (ref 0–0)
MACROCYTES BLD QL: SLIGHT — SIGNIFICANT CHANGE UP
MCHC RBC-ENTMCNC: 29.2 PG — SIGNIFICANT CHANGE UP (ref 27–34)
MCHC RBC-ENTMCNC: 34.2 GM/DL — SIGNIFICANT CHANGE UP (ref 32–36)
MCV RBC AUTO: 85.4 FL — SIGNIFICANT CHANGE UP (ref 80–100)
MONOCYTES # BLD AUTO: 0.4 K/UL — SIGNIFICANT CHANGE UP (ref 0–0.9)
MONOCYTES NFR BLD AUTO: 20 % — HIGH (ref 2–14)
NEUTROPHILS # BLD AUTO: 0.1 K/UL — LOW (ref 1.8–7.4)
NEUTROPHILS NFR BLD AUTO: 9 % — LOW (ref 43–77)
NEUTS BAND # BLD: 2 % — SIGNIFICANT CHANGE UP (ref 0–8)
NRBC # BLD: 2 /100 — HIGH (ref 0–0)
OVALOCYTES BLD QL SMEAR: SLIGHT — SIGNIFICANT CHANGE UP
PLAT MORPH BLD: NORMAL — SIGNIFICANT CHANGE UP
PLATELET # BLD AUTO: 225 K/UL — SIGNIFICANT CHANGE UP (ref 150–400)
POLYCHROMASIA BLD QL SMEAR: SLIGHT — SIGNIFICANT CHANGE UP
RBC # BLD: 3 M/UL — LOW (ref 4.2–5.8)
RBC # FLD: 15.4 % — HIGH (ref 10.3–14.5)
RBC BLD AUTO: SIGNIFICANT CHANGE UP
ROULEAUX BLD QL SMEAR: PRESENT — SIGNIFICANT CHANGE UP
SCHISTOCYTES BLD QL AUTO: SLIGHT — SIGNIFICANT CHANGE UP
SPHEROCYTES BLD QL SMEAR: SLIGHT — SIGNIFICANT CHANGE UP
TOXIC GRANULES BLD QL SMEAR: PRESENT — SIGNIFICANT CHANGE UP
WBC # BLD: 1.3 K/UL — LOW (ref 3.8–10.5)
WBC # FLD AUTO: 1.3 K/UL — LOW (ref 3.8–10.5)

## 2017-07-19 PROCEDURE — 85060 BLOOD SMEAR INTERPRETATION: CPT

## 2017-07-19 RX ORDER — OXYCODONE 5 MG/1
5 TABLET ORAL
Qty: 120 | Refills: 0 | Status: ACTIVE | OUTPATIENT
Start: 2017-07-19

## 2017-07-19 RX ORDER — OXYCODONE 5 MG/1
5 TABLET ORAL
Qty: 120 | Refills: 0 | Status: COMPLETED | OUTPATIENT
Start: 2017-07-19 | End: 2017-07-19

## 2017-07-20 ENCOUNTER — APPOINTMENT (OUTPATIENT)
Dept: INFUSION THERAPY | Facility: CLINIC | Age: 57
End: 2017-07-20

## 2017-07-20 LAB
ALBUMIN SERPL ELPH-MCNC: 3 G/DL
ALP BLD-CCNC: 170 U/L
ALT SERPL-CCNC: 11 U/L
ANION GAP SERPL CALC-SCNC: 16 MMOL/L
AST SERPL-CCNC: 16 U/L
BILIRUB SERPL-MCNC: 1.3 MG/DL
BUN SERPL-MCNC: 11 MG/DL
CALCIUM SERPL-MCNC: 9.1 MG/DL
CHLORIDE SERPL-SCNC: 94 MMOL/L
CO2 SERPL-SCNC: 23 MMOL/L
CREAT SERPL-MCNC: 0.89 MG/DL
GLUCOSE SERPL-MCNC: 187 MG/DL
POTASSIUM SERPL-SCNC: 4.5 MMOL/L
PROT SERPL-MCNC: 6.2 G/DL
SODIUM SERPL-SCNC: 133 MMOL/L

## 2017-07-21 ENCOUNTER — APPOINTMENT (OUTPATIENT)
Dept: INFUSION THERAPY | Facility: CLINIC | Age: 57
End: 2017-07-21

## 2017-07-21 DIAGNOSIS — Z51.89 ENCOUNTER FOR OTHER SPECIFIED AFTERCARE: ICD-10-CM

## 2017-07-26 ENCOUNTER — RESULT REVIEW (OUTPATIENT)
Age: 57
End: 2017-07-26

## 2017-07-26 ENCOUNTER — APPOINTMENT (OUTPATIENT)
Dept: INFUSION THERAPY | Facility: CLINIC | Age: 57
End: 2017-07-26

## 2017-07-26 ENCOUNTER — LABORATORY RESULT (OUTPATIENT)
Age: 57
End: 2017-07-26

## 2017-07-26 ENCOUNTER — APPOINTMENT (OUTPATIENT)
Dept: HEMATOLOGY ONCOLOGY | Facility: CLINIC | Age: 57
End: 2017-07-26

## 2017-07-26 LAB
ANISOCYTOSIS BLD QL: SLIGHT — SIGNIFICANT CHANGE UP
BASO STIPL BLD QL SMEAR: PRESENT — SIGNIFICANT CHANGE UP
BASOPHILS # BLD AUTO: 0.1 K/UL — SIGNIFICANT CHANGE UP (ref 0–0.2)
EOSINOPHIL # BLD AUTO: 0.1 K/UL — SIGNIFICANT CHANGE UP (ref 0–0.5)
EOSINOPHIL NFR BLD AUTO: 1 % — SIGNIFICANT CHANGE UP (ref 0–6)
HCT VFR BLD CALC: 26.8 % — LOW (ref 39–50)
HGB BLD-MCNC: 9 G/DL — LOW (ref 13–17)
LYMPHOCYTES # BLD AUTO: 13 % — SIGNIFICANT CHANGE UP (ref 13–44)
LYMPHOCYTES # BLD AUTO: 2.6 K/UL — SIGNIFICANT CHANGE UP (ref 1–3.3)
LYMPHOCYTES # SPEC AUTO: 1 % — HIGH (ref 0–0)
MACROCYTES BLD QL: SLIGHT — SIGNIFICANT CHANGE UP
MANUAL DIF COMMENT BLD-IMP: SIGNIFICANT CHANGE UP
MCHC RBC-ENTMCNC: 30.5 PG — SIGNIFICANT CHANGE UP (ref 27–34)
MCHC RBC-ENTMCNC: 33.5 GM/DL — SIGNIFICANT CHANGE UP (ref 32–36)
MCV RBC AUTO: 90.8 FL — SIGNIFICANT CHANGE UP (ref 80–100)
METAMYELOCYTES # FLD: 3 % — HIGH (ref 0–0)
MICROCYTES BLD QL: SLIGHT — SIGNIFICANT CHANGE UP
MONOCYTES # BLD AUTO: 1.4 K/UL — HIGH (ref 0–0.9)
MONOCYTES NFR BLD AUTO: 2 % — SIGNIFICANT CHANGE UP (ref 2–14)
MYELOCYTES NFR BLD: 1 % — HIGH (ref 0–0)
NEUTROPHILS # BLD AUTO: 25.3 K/UL — HIGH (ref 1.8–7.4)
NEUTROPHILS NFR BLD AUTO: 79 % — HIGH (ref 43–77)
NEUTS HYPERSEG # BLD: PRESENT — SIGNIFICANT CHANGE UP
PLAT MORPH BLD: NORMAL — SIGNIFICANT CHANGE UP
PLATELET # BLD AUTO: 408 K/UL — HIGH (ref 150–400)
POIKILOCYTOSIS BLD QL AUTO: SLIGHT — SIGNIFICANT CHANGE UP
POLYCHROMASIA BLD QL SMEAR: SLIGHT — SIGNIFICANT CHANGE UP
RBC # BLD: 2.95 M/UL — LOW (ref 4.2–5.8)
RBC # FLD: 17.3 % — HIGH (ref 10.3–14.5)
RBC BLD AUTO: SIGNIFICANT CHANGE UP
SCHISTOCYTES BLD QL AUTO: SLIGHT — SIGNIFICANT CHANGE UP
SMUDGE CELLS # BLD: PRESENT — SIGNIFICANT CHANGE UP
SPHEROCYTES BLD QL SMEAR: SLIGHT — SIGNIFICANT CHANGE UP
WBC # BLD: 29.5 K/UL — HIGH (ref 3.8–10.5)
WBC # FLD AUTO: 29.5 K/UL — HIGH (ref 3.8–10.5)

## 2017-07-27 LAB
MANUAL DIF COMMENT BLD-IMP: SIGNIFICANT CHANGE UP

## 2017-08-03 ENCOUNTER — APPOINTMENT (OUTPATIENT)
Dept: INFUSION THERAPY | Facility: CLINIC | Age: 57
End: 2017-08-03

## 2017-08-03 ENCOUNTER — INPATIENT (INPATIENT)
Facility: HOSPITAL | Age: 57
LOS: 0 days | Discharge: HOME HEALTH | DRG: 811 | End: 2017-08-04
Attending: HOSPITALIST | Admitting: HOSPITALIST
Payer: MEDICAID

## 2017-08-03 ENCOUNTER — RESULT REVIEW (OUTPATIENT)
Age: 57
End: 2017-08-03

## 2017-08-03 ENCOUNTER — APPOINTMENT (OUTPATIENT)
Dept: HEMATOLOGY ONCOLOGY | Facility: CLINIC | Age: 57
End: 2017-08-03

## 2017-08-03 VITALS
HEIGHT: 66 IN | OXYGEN SATURATION: 98 % | RESPIRATION RATE: 18 BRPM | HEART RATE: 78 BPM | DIASTOLIC BLOOD PRESSURE: 60 MMHG | WEIGHT: 130.07 LBS | SYSTOLIC BLOOD PRESSURE: 90 MMHG | TEMPERATURE: 98 F

## 2017-08-03 DIAGNOSIS — Z96.89 PRESENCE OF OTHER SPECIFIED FUNCTIONAL IMPLANTS: Chronic | ICD-10-CM

## 2017-08-03 DIAGNOSIS — Z98.890 OTHER SPECIFIED POSTPROCEDURAL STATES: Chronic | ICD-10-CM

## 2017-08-03 DIAGNOSIS — R53.1 WEAKNESS: ICD-10-CM

## 2017-08-03 DIAGNOSIS — C25.9 MALIGNANT NEOPLASM OF PANCREAS, UNSPECIFIED: Chronic | ICD-10-CM

## 2017-08-03 LAB
ALBUMIN SERPL ELPH-MCNC: 2.6 G/DL — LOW (ref 3.3–5.2)
ALP SERPL-CCNC: 863 U/L — HIGH (ref 40–120)
ALT FLD-CCNC: 29 U/L — SIGNIFICANT CHANGE UP
ANION GAP SERPL CALC-SCNC: 13 MMOL/L — SIGNIFICANT CHANGE UP (ref 5–17)
APTT BLD: 27.9 SEC — SIGNIFICANT CHANGE UP (ref 27.5–37.4)
AST SERPL-CCNC: 40 U/L — HIGH
BASOPHILS # BLD AUTO: 0 K/UL — SIGNIFICANT CHANGE UP (ref 0–0.2)
BASOPHILS # BLD AUTO: 0.1 K/UL — SIGNIFICANT CHANGE UP (ref 0–0.2)
BASOPHILS NFR BLD AUTO: 0.1 % — SIGNIFICANT CHANGE UP (ref 0–2)
BASOPHILS NFR BLD AUTO: 0.4 % — SIGNIFICANT CHANGE UP (ref 0–2)
BILIRUB SERPL-MCNC: 3 MG/DL — HIGH (ref 0.4–2)
BLD GP AB SCN SERPL QL: SIGNIFICANT CHANGE UP
BUN SERPL-MCNC: 18 MG/DL — SIGNIFICANT CHANGE UP (ref 8–20)
CALCIUM SERPL-MCNC: 8.8 MG/DL — SIGNIFICANT CHANGE UP (ref 8.6–10.2)
CHLORIDE SERPL-SCNC: 94 MMOL/L — LOW (ref 98–107)
CK SERPL-CCNC: 18 U/L — LOW (ref 30–200)
CO2 SERPL-SCNC: 22 MMOL/L — SIGNIFICANT CHANGE UP (ref 22–29)
CREAT SERPL-MCNC: 0.55 MG/DL — SIGNIFICANT CHANGE UP (ref 0.5–1.3)
EOSINOPHIL # BLD AUTO: 0.1 K/UL — SIGNIFICANT CHANGE UP (ref 0–0.5)
EOSINOPHIL NFR BLD AUTO: 0.4 % — SIGNIFICANT CHANGE UP (ref 0–6)
GLUCOSE SERPL-MCNC: 50 MG/DL — CRITICAL LOW (ref 70–115)
HCT VFR BLD CALC: 22.6 % — LOW (ref 42–52)
HCT VFR BLD CALC: 24.4 % — LOW (ref 39–50)
HGB BLD-MCNC: 7.6 G/DL — LOW (ref 14–18)
HGB BLD-MCNC: 7.8 G/DL — LOW (ref 13–17)
INR BLD: 1.42 RATIO — HIGH (ref 0.88–1.16)
LYMPHOCYTES # BLD AUTO: 1.4 K/UL — SIGNIFICANT CHANGE UP (ref 1–3.3)
LYMPHOCYTES # BLD AUTO: 1.4 K/UL — SIGNIFICANT CHANGE UP (ref 1–4.8)
LYMPHOCYTES # BLD AUTO: 10.5 % — LOW (ref 20–55)
LYMPHOCYTES # BLD AUTO: 9.1 % — LOW (ref 13–44)
MCHC RBC-ENTMCNC: 29.1 PG — SIGNIFICANT CHANGE UP (ref 27–31)
MCHC RBC-ENTMCNC: 29.6 PG — SIGNIFICANT CHANGE UP (ref 27–34)
MCHC RBC-ENTMCNC: 31.8 GM/DL — LOW (ref 32–36)
MCHC RBC-ENTMCNC: 33.6 G/DL — SIGNIFICANT CHANGE UP (ref 32–36)
MCV RBC AUTO: 86.6 FL — SIGNIFICANT CHANGE UP (ref 80–94)
MCV RBC AUTO: 93.2 FL — SIGNIFICANT CHANGE UP (ref 80–100)
MONOCYTES # BLD AUTO: 0.7 K/UL — SIGNIFICANT CHANGE UP (ref 0–0.8)
MONOCYTES # BLD AUTO: 0.9 K/UL — SIGNIFICANT CHANGE UP (ref 0–0.9)
MONOCYTES NFR BLD AUTO: 5 % — SIGNIFICANT CHANGE UP (ref 3–10)
MONOCYTES NFR BLD AUTO: 6 % — SIGNIFICANT CHANGE UP (ref 2–14)
NEUTROPHILS # BLD AUTO: 11.4 K/UL — HIGH (ref 1.8–8)
NEUTROPHILS # BLD AUTO: 13.2 K/UL — HIGH (ref 1.8–7.4)
NEUTROPHILS NFR BLD AUTO: 83.7 % — HIGH (ref 37–73)
NEUTROPHILS NFR BLD AUTO: 84.2 % — HIGH (ref 43–77)
OB PNL STL: POSITIVE
PLATELET # BLD AUTO: 434 K/UL — HIGH (ref 150–400)
PLATELET # BLD AUTO: 435 K/UL — HIGH (ref 150–400)
POTASSIUM SERPL-MCNC: 4.5 MMOL/L — SIGNIFICANT CHANGE UP (ref 3.5–5.3)
POTASSIUM SERPL-SCNC: 4.5 MMOL/L — SIGNIFICANT CHANGE UP (ref 3.5–5.3)
PROT SERPL-MCNC: 6.9 G/DL — SIGNIFICANT CHANGE UP (ref 6.6–8.7)
PROTHROM AB SERPL-ACNC: 15.7 SEC — HIGH (ref 9.8–12.7)
RBC # BLD: 2.61 M/UL — LOW (ref 4.6–6.2)
RBC # BLD: 2.62 M/UL — LOW (ref 4.2–5.8)
RBC # FLD: 17.4 % — HIGH (ref 10.3–14.5)
RBC # FLD: 18.5 % — HIGH (ref 11–15.6)
SODIUM SERPL-SCNC: 129 MMOL/L — LOW (ref 135–145)
TROPONIN T SERPL-MCNC: 0.02 NG/ML — SIGNIFICANT CHANGE UP (ref 0–0.06)
TYPE + AB SCN PNL BLD: SIGNIFICANT CHANGE UP
WBC # BLD: 13.6 K/UL — HIGH (ref 4.8–10.8)
WBC # BLD: 15.7 K/UL — HIGH (ref 3.8–10.5)
WBC # FLD AUTO: 13.6 K/UL — HIGH (ref 4.8–10.8)
WBC # FLD AUTO: 15.7 K/UL — HIGH (ref 3.8–10.5)

## 2017-08-03 PROCEDURE — 71010: CPT | Mod: 26

## 2017-08-03 PROCEDURE — 99285 EMERGENCY DEPT VISIT HI MDM: CPT

## 2017-08-03 PROCEDURE — 86077 PHYS BLOOD BANK SERV XMATCH: CPT

## 2017-08-03 PROCEDURE — 70450 CT HEAD/BRAIN W/O DYE: CPT | Mod: 26

## 2017-08-03 PROCEDURE — 93010 ELECTROCARDIOGRAM REPORT: CPT | Mod: 76

## 2017-08-03 PROCEDURE — 99223 1ST HOSP IP/OBS HIGH 75: CPT

## 2017-08-03 RX ORDER — PANTOPRAZOLE SODIUM 20 MG/1
40 TABLET, DELAYED RELEASE ORAL ONCE
Qty: 0 | Refills: 0 | Status: DISCONTINUED | OUTPATIENT
Start: 2017-08-03 | End: 2017-08-03

## 2017-08-03 RX ORDER — PANTOPRAZOLE SODIUM 20 MG/1
80 TABLET, DELAYED RELEASE ORAL ONCE
Qty: 0 | Refills: 0 | Status: COMPLETED | OUTPATIENT
Start: 2017-08-03 | End: 2017-08-04

## 2017-08-03 RX ORDER — PANTOPRAZOLE SODIUM 20 MG/1
8 TABLET, DELAYED RELEASE ORAL
Qty: 80 | Refills: 0 | Status: DISCONTINUED | OUTPATIENT
Start: 2017-08-03 | End: 2017-08-04

## 2017-08-03 RX ORDER — SODIUM CHLORIDE 9 MG/ML
1000 INJECTION INTRAMUSCULAR; INTRAVENOUS; SUBCUTANEOUS ONCE
Qty: 0 | Refills: 0 | Status: COMPLETED | OUTPATIENT
Start: 2017-08-03 | End: 2017-08-03

## 2017-08-03 RX ORDER — ONDANSETRON 8 MG/1
4 TABLET, FILM COATED ORAL EVERY 6 HOURS
Qty: 0 | Refills: 0 | Status: DISCONTINUED | OUTPATIENT
Start: 2017-08-03 | End: 2017-08-04

## 2017-08-03 RX ORDER — SODIUM CHLORIDE 9 MG/ML
1000 INJECTION INTRAMUSCULAR; INTRAVENOUS; SUBCUTANEOUS
Qty: 0 | Refills: 0 | Status: DISCONTINUED | OUTPATIENT
Start: 2017-08-03 | End: 2017-08-04

## 2017-08-03 RX ORDER — PANTOPRAZOLE 40 MG/1
40 TABLET, DELAYED RELEASE ORAL DAILY
Qty: 60 | Refills: 5 | Status: ACTIVE | OUTPATIENT
Start: 2017-04-17

## 2017-08-03 RX ORDER — FAMOTIDINE 10 MG/ML
20 INJECTION INTRAVENOUS DAILY
Qty: 0 | Refills: 0 | Status: DISCONTINUED | OUTPATIENT
Start: 2017-08-03 | End: 2017-08-04

## 2017-08-03 RX ORDER — SODIUM CHLORIDE 9 MG/ML
3 INJECTION INTRAMUSCULAR; INTRAVENOUS; SUBCUTANEOUS ONCE
Qty: 0 | Refills: 0 | Status: COMPLETED | OUTPATIENT
Start: 2017-08-03 | End: 2017-08-03

## 2017-08-03 RX ADMIN — SODIUM CHLORIDE 3 MILLILITER(S): 9 INJECTION INTRAMUSCULAR; INTRAVENOUS; SUBCUTANEOUS at 16:17

## 2017-08-03 RX ADMIN — Medication 30 MILLILITER(S): at 20:40

## 2017-08-03 RX ADMIN — FAMOTIDINE 20 MILLIGRAM(S): 10 INJECTION INTRAVENOUS at 20:40

## 2017-08-03 RX ADMIN — SODIUM CHLORIDE 9999 MILLILITER(S): 9 INJECTION INTRAMUSCULAR; INTRAVENOUS; SUBCUTANEOUS at 16:54

## 2017-08-03 NOTE — ED PROVIDER NOTE - MEDICAL DECISION MAKING DETAILS
weakness/syncope--initially sent to the ED as HGB 7.6--->will fu labs to check hgb bmp to check electrolytes; ekg wml; trop neg--cxr and ua to r/o infectious iteology--anticipate admit to telemetry for weakness/syncope

## 2017-08-03 NOTE — ED PROVIDER NOTE - OBJECTIVE STATEMENT
56yo M w/ pmhx of stomach and colon ca on chemo (last chemo 1 month ago) p/w weakness, and a drop in hgb. family notes that pt has been 58yo M w/ pmhx of stomach and colon ca on chemo (last chemo 1 month ago) p/w weakness, and a drop in hgb. family notes that pt has been more weak lately, daughter notes pt syncopized yesterday and has been having intermittent syncope episodes when walks for a little bit. +CHILLS +cough Denies n/v/cp/sob/palpitations/rash/headache/abd.pain/d/c/dysuria/hematuria. no recent travel/sick contacts. went to get chemo today but was told to go to the hospital as Hgb 7.6---was around 9 at [facility]

## 2017-08-03 NOTE — H&P ADULT - GASTROINTESTINAL DETAILS
no distention/soft/bowel sounds normal/nontender/rt. lower chest wall has a drain, no sig. fluid is present in drainage bag.
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
## 2017-08-03 NOTE — ED ADULT NURSE NOTE - OBJECTIVE STATEMENT
Pt A&XO3, states he was sent o ED by PMD for low hemoglobin.  Pt denies any obvious bleeding.  Pt also c/o acid reflux X 2 weeks, causing decreased PO intake.  Clear bsb, abd soft nondistended, nontender, moving all ext well.  Pt has pancreatic drain in right upper abd quad, dark brown fluid draining.  Pt appears very jaundice.
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
## 2017-08-03 NOTE — ED ADULT NURSE NOTE - MUSCULOSKELETAL WDL
Hibiclens 4% topical solution and Bactroban nasal ointment ordered   Patient it start Hibiclens and Bactroban nasal ointment application 5 days prior to procedure date    Directions Hibiclens 4%: Start cleanse 5 days prior to procedure   1. Rinse area (upper chest and upper arms) with water. 2. Apply minimum amount necessary to scrub the upper chest area from shoulder/neck to mid line of chest and to below the nipple each of  5 nights before the day of the procedure  3. Let solution dry. Bactroban nasal ointment (this can be bought over-the-counter):  Swab both nostrils with clean cotton applicator twice a day, 5 days prior to scheduled procedure. You  will need to follow up in clinic with Dr. Cornelious Felty in 2 weeks. Full range of motion of upper and lower extremities, no joint tenderness/swelling.
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
## 2017-08-03 NOTE — H&P ADULT - RECTAL EXAM
no obvious lesion noted, good tone, brown stool noted over examining finger. no obvious lesion noted, good tone, somewhat brown stool noted over examining finger.

## 2017-08-03 NOTE — H&P ADULT - HISTORY OF PRESENT ILLNESS
56 y/o male  s/p duodenal adenocarcinoma , s/p laparotomy, gastrojejunostomy and enterectomy by Dr. Sosa on  3/20/17 was seen by her oncologist Dr. Abreu today and was noted to have Hb 7.8 from his baseline of 9 was sent to ER as he has been very fatigued, tired and on and off feeling dizzy. no HA reported.  pt. reports no blood per rectum or hematemesis. sometimes gets nausea, + heart burn. no abd pain. no fever. ellie cough / phlegm. no cp. no sob. As per family his last chemo was about 2 weeks ago and today he did not receive any treatment at cancer center and was sent to ER. pt. has a drain over his rt. lower chest wall which was put in after his surgery back in march, 2017 as per pt. As per pt. he never had blood transfusion in the past. 58 y/o male  s/p duodenal adenocarcinoma , s/p laparotomy, gastrojejunostomy and enterectomy by Dr. Sosa on  3/20/17 was seen by her oncologist Dr. Abreu today and was noted to have Hb 7.8 from his baseline of 9 was sent to ER as he has been very fatigued, tired and on and off feeling dizzy. no HA reported.  pt. reports no blood per rectum or hematemesis. sometimes gets nausea, + heart burn. no abd pain. no fever. ellie cough / phlegm. no cp. no sob. As per family his last chemo was about 2 weeks ago and today he did not receive any treatment at cancer center and was sent to ER. pt. has a drain over his rt. lower chest wall which was put in after his surgery back in march, 2017 as per pt. As per pt. he never had blood transfusion in the past.  pt's initial labs in ER showed glucose of 50 , he was given orange juice and zachery repeat accuchk is 201.

## 2017-08-03 NOTE — ED ADULT NURSE REASSESSMENT NOTE - NS ED NURSE REASSESS COMMENT FT1
Pt given orange juice and turkey sandwich for BS = 50, as requested by Dr. FLORES.  Pt asymptomatic.
Patient resting in bed, awake, alert and oriented X3, resp even/unlabored, lungs CTAB, VS stable, afebrile, patient reports mild abdominal discomfort and heart burn, ED MD made aware, awaiting for available bed, will continue to monitor patient.

## 2017-08-03 NOTE — H&P ADULT - ASSESSMENT
pt. is admitted for symptomatic anemia, will give 2 units of prbc. iv fluids. will get ct head to r/o mets to see if that is causing dizziness,  on and off dizziness is likely from symptmatic anemia , trop neg. ekg with no acute changes.    GI bleed, unspecified, will keep on protonix drip, has been c/o heart burn, GI consult [facility].    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    Hypoalbuminemia with sever malnutrition, GI consult on board.    Elevated alk. phos, likely related to his malignancy.

## 2017-08-04 ENCOUNTER — TRANSCRIPTION ENCOUNTER (OUTPATIENT)
Age: 57
End: 2017-08-04

## 2017-08-04 VITALS
TEMPERATURE: 99 F | SYSTOLIC BLOOD PRESSURE: 114 MMHG | RESPIRATION RATE: 18 BRPM | HEART RATE: 92 BPM | DIASTOLIC BLOOD PRESSURE: 60 MMHG

## 2017-08-04 DIAGNOSIS — D64.89 OTHER SPECIFIED ANEMIAS: ICD-10-CM

## 2017-08-04 DIAGNOSIS — C17.0 MALIGNANT NEOPLASM OF DUODENUM: ICD-10-CM

## 2017-08-04 LAB
ALLERGY+IMMUNOLOGY DIAG STUDY NOTE: SIGNIFICANT CHANGE UP
ANION GAP SERPL CALC-SCNC: 10 MMOL/L — SIGNIFICANT CHANGE UP (ref 5–17)
ANION GAP SERPL CALC-SCNC: 12 MMOL/L — SIGNIFICANT CHANGE UP (ref 5–17)
BUN SERPL-MCNC: 12 MG/DL — SIGNIFICANT CHANGE UP (ref 8–20)
BUN SERPL-MCNC: 12 MG/DL — SIGNIFICANT CHANGE UP (ref 8–20)
CALCIUM SERPL-MCNC: 8.3 MG/DL — LOW (ref 8.6–10.2)
CALCIUM SERPL-MCNC: 8.4 MG/DL — LOW (ref 8.6–10.2)
CHLORIDE SERPL-SCNC: 100 MMOL/L — SIGNIFICANT CHANGE UP (ref 98–107)
CHLORIDE SERPL-SCNC: 97 MMOL/L — LOW (ref 98–107)
CO2 SERPL-SCNC: 20 MMOL/L — LOW (ref 22–29)
CO2 SERPL-SCNC: 21 MMOL/L — LOW (ref 22–29)
CREAT SERPL-MCNC: 0.39 MG/DL — LOW (ref 0.5–1.3)
CREAT SERPL-MCNC: 0.41 MG/DL — LOW (ref 0.5–1.3)
GLUCOSE SERPL-MCNC: 210 MG/DL — HIGH (ref 70–115)
GLUCOSE SERPL-MCNC: 292 MG/DL — HIGH (ref 70–115)
HCT VFR BLD CALC: 26.3 % — LOW (ref 42–52)
HCT VFR BLD CALC: 31 % — LOW (ref 42–52)
HGB BLD-MCNC: 10.4 G/DL — LOW (ref 14–18)
HGB BLD-MCNC: 8.9 G/DL — LOW (ref 14–18)
MCHC RBC-ENTMCNC: 28.8 PG — SIGNIFICANT CHANGE UP (ref 27–31)
MCHC RBC-ENTMCNC: 29.7 PG — SIGNIFICANT CHANGE UP (ref 27–31)
MCHC RBC-ENTMCNC: 33.5 G/DL — SIGNIFICANT CHANGE UP (ref 32–36)
MCHC RBC-ENTMCNC: 33.8 G/DL — SIGNIFICANT CHANGE UP (ref 32–36)
MCV RBC AUTO: 85.9 FL — SIGNIFICANT CHANGE UP (ref 80–94)
MCV RBC AUTO: 87.7 FL — SIGNIFICANT CHANGE UP (ref 80–94)
PLATELET # BLD AUTO: 371 K/UL — SIGNIFICANT CHANGE UP (ref 150–400)
PLATELET # BLD AUTO: 376 K/UL — SIGNIFICANT CHANGE UP (ref 150–400)
POTASSIUM SERPL-MCNC: 4.5 MMOL/L — SIGNIFICANT CHANGE UP (ref 3.5–5.3)
POTASSIUM SERPL-MCNC: 4.6 MMOL/L — SIGNIFICANT CHANGE UP (ref 3.5–5.3)
POTASSIUM SERPL-SCNC: 4.5 MMOL/L — SIGNIFICANT CHANGE UP (ref 3.5–5.3)
POTASSIUM SERPL-SCNC: 4.6 MMOL/L — SIGNIFICANT CHANGE UP (ref 3.5–5.3)
RBC # BLD: 3 M/UL — LOW (ref 4.6–6.2)
RBC # BLD: 3.61 M/UL — LOW (ref 4.6–6.2)
RBC # FLD: 16.6 % — HIGH (ref 11–15.6)
RBC # FLD: 17.2 % — HIGH (ref 11–15.6)
SODIUM SERPL-SCNC: 129 MMOL/L — LOW (ref 135–145)
SODIUM SERPL-SCNC: 131 MMOL/L — LOW (ref 135–145)
WBC # BLD: 8.9 K/UL — SIGNIFICANT CHANGE UP (ref 4.8–10.8)
WBC # BLD: 9.4 K/UL — SIGNIFICANT CHANGE UP (ref 4.8–10.8)
WBC # FLD AUTO: 8.9 K/UL — SIGNIFICANT CHANGE UP (ref 4.8–10.8)
WBC # FLD AUTO: 9.4 K/UL — SIGNIFICANT CHANGE UP (ref 4.8–10.8)

## 2017-08-04 PROCEDURE — 93005 ELECTROCARDIOGRAM TRACING: CPT

## 2017-08-04 PROCEDURE — 86922 COMPATIBILITY TEST ANTIGLOB: CPT

## 2017-08-04 PROCEDURE — 82550 ASSAY OF CK (CPK): CPT

## 2017-08-04 PROCEDURE — 36415 COLL VENOUS BLD VENIPUNCTURE: CPT

## 2017-08-04 PROCEDURE — 99239 HOSP IP/OBS DSCHRG MGMT >30: CPT

## 2017-08-04 PROCEDURE — 86901 BLOOD TYPING SEROLOGIC RH(D): CPT

## 2017-08-04 PROCEDURE — 85027 COMPLETE CBC AUTOMATED: CPT

## 2017-08-04 PROCEDURE — P9016: CPT

## 2017-08-04 PROCEDURE — 86900 BLOOD TYPING SEROLOGIC ABO: CPT

## 2017-08-04 PROCEDURE — 86870 RBC ANTIBODY IDENTIFICATION: CPT

## 2017-08-04 PROCEDURE — 99222 1ST HOSP IP/OBS MODERATE 55: CPT

## 2017-08-04 PROCEDURE — 70450 CT HEAD/BRAIN W/O DYE: CPT

## 2017-08-04 PROCEDURE — T1013: CPT

## 2017-08-04 PROCEDURE — 71045 X-RAY EXAM CHEST 1 VIEW: CPT

## 2017-08-04 PROCEDURE — 85610 PROTHROMBIN TIME: CPT

## 2017-08-04 PROCEDURE — 99232 SBSQ HOSP IP/OBS MODERATE 35: CPT

## 2017-08-04 PROCEDURE — 36430 TRANSFUSION BLD/BLD COMPNT: CPT

## 2017-08-04 PROCEDURE — 99285 EMERGENCY DEPT VISIT HI MDM: CPT | Mod: 25

## 2017-08-04 PROCEDURE — 80053 COMPREHEN METABOLIC PANEL: CPT

## 2017-08-04 PROCEDURE — 84484 ASSAY OF TROPONIN QUANT: CPT

## 2017-08-04 PROCEDURE — 85730 THROMBOPLASTIN TIME PARTIAL: CPT

## 2017-08-04 PROCEDURE — 82272 OCCULT BLD FECES 1-3 TESTS: CPT

## 2017-08-04 PROCEDURE — 86850 RBC ANTIBODY SCREEN: CPT

## 2017-08-04 PROCEDURE — 97163 PT EVAL HIGH COMPLEX 45 MIN: CPT

## 2017-08-04 PROCEDURE — 80048 BASIC METABOLIC PNL TOTAL CA: CPT

## 2017-08-04 RX ORDER — PROCHLORPERAZINE MALEATE 5 MG
1 TABLET ORAL
Qty: 0 | Refills: 0 | COMMUNITY

## 2017-08-04 RX ORDER — PANTOPRAZOLE SODIUM 20 MG/1
40 TABLET, DELAYED RELEASE ORAL
Qty: 0 | Refills: 0 | Status: DISCONTINUED | OUTPATIENT
Start: 2017-08-04 | End: 2017-08-04

## 2017-08-04 RX ORDER — FERROUS SULFATE 325(65) MG
1 TABLET ORAL
Qty: 0 | Refills: 0 | COMMUNITY

## 2017-08-04 RX ORDER — PANTOPRAZOLE SODIUM 20 MG/1
1 TABLET, DELAYED RELEASE ORAL
Qty: 60 | Refills: 0 | OUTPATIENT
Start: 2017-08-04 | End: 2017-09-03

## 2017-08-04 RX ORDER — TRAMADOL HYDROCHLORIDE 50 MG/1
1 TABLET ORAL
Qty: 0 | Refills: 0 | COMMUNITY

## 2017-08-04 RX ORDER — INSULIN GLARGINE 100 [IU]/ML
10 INJECTION, SOLUTION SUBCUTANEOUS
Qty: 0 | Refills: 0 | COMMUNITY

## 2017-08-04 RX ORDER — TAMSULOSIN HYDROCHLORIDE 0.4 MG/1
1 CAPSULE ORAL
Qty: 30 | Refills: 0 | OUTPATIENT
Start: 2017-08-04 | End: 2017-09-03

## 2017-08-04 RX ORDER — PYRIDOXINE HCL (VITAMIN B6) 100 MG
1 TABLET ORAL
Qty: 0 | Refills: 0 | COMMUNITY

## 2017-08-04 RX ADMIN — Medication 30 MILLILITER(S): at 14:05

## 2017-08-04 RX ADMIN — PANTOPRAZOLE SODIUM 80 MILLIGRAM(S): 20 TABLET, DELAYED RELEASE ORAL at 00:22

## 2017-08-04 RX ADMIN — PANTOPRAZOLE SODIUM 10 MG/HR: 20 TABLET, DELAYED RELEASE ORAL at 00:22

## 2017-08-04 RX ADMIN — SODIUM CHLORIDE 100 MILLILITER(S): 9 INJECTION INTRAMUSCULAR; INTRAVENOUS; SUBCUTANEOUS at 00:23

## 2017-08-04 RX ADMIN — SODIUM CHLORIDE 1000 MILLILITER(S): 9 INJECTION INTRAMUSCULAR; INTRAVENOUS; SUBCUTANEOUS at 00:23

## 2017-08-04 RX ADMIN — FAMOTIDINE 20 MILLIGRAM(S): 10 INJECTION INTRAVENOUS at 12:07

## 2017-08-04 RX ADMIN — PANTOPRAZOLE SODIUM 10 MG/HR: 20 TABLET, DELAYED RELEASE ORAL at 07:50

## 2017-08-04 NOTE — DISCHARGE NOTE ADULT - SECONDARY DIAGNOSIS.
Duodenal adenocarcinoma Malignant neoplasm of pancreas, unspecified location of malignancy Type 2 diabetes mellitus with other neurologic complication Hyponatremia Elevated alkaline phosphatase level
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
## 2017-08-04 NOTE — PROGRESS NOTE ADULT - ASSESSMENT
pt. is admitted for symptomatic anemia, will give 2 units of prbc. iv fluids. will get ct head to r/o mets to see if that is causing dizziness,  on and off dizziness is likely from symptmatic anemia , trop neg. ekg with no acute changes.    GI bleed, unspecified, will keep on protonix drip, has been c/o heart burn, GI consult [facility].    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    Hypoalbuminemia with sever malnutrition, GI consult on board.    Elevated alk. phos, likely related to his malignancy. pt. is admitted for symptomatic anemia, will give 2 units of prbc. 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    Hypoalbuminemia with sever malnutrition, GI consult on board.    Elevated alk. phos, likely related to his malignancy.     patient is being discharged home today, please see d/c summary.

## 2017-08-04 NOTE — CONSULT NOTE ADULT - CONSULT REASON
anemia;  +fobt;  on chemo for Pancreatic cancer with local invasion and prior gastric outlet obstruction.  Palliated with gastrojejunostomy in 3/2017.
duodenal adenocarcinoma

## 2017-08-04 NOTE — PHYSICAL THERAPY INITIAL EVALUATION ADULT - ADDITIONAL COMMENTS
Per pt - he lives in a house with 4 steps to enter through front but he goes around back where there are no steps. Bedroom is on first floor. Pt states his spouse assists him with everything.

## 2017-08-04 NOTE — DISCHARGE NOTE ADULT - MEDICATION SUMMARY - MEDICATIONS TO TAKE
I will START or STAY ON the medications listed below when I get home from the hospital:    Flomax 0.4 mg oral capsule  -- 1 cap(s) by mouth once a day (in the evening)  -- It is very important that you take or use this exactly as directed.  Do not skip doses or discontinue unless directed by your doctor.  May cause drowsiness.  Alcohol may intensify this effect.  Use care when operating dangerous machinery.  Some non-prescription drugs may aggravate your condition.  Read all labels carefully.  If a warning appears, check with your doctor before taking.  Swallow whole.  Do not crush.  Take with food or milk.    -- Indication: For BPH    amlodipine 5 mg oral tablet  -- 1 tab(s) by mouth once a day in pm  -- Indication: For HTN    ferrous sulfate 325 mg (65 mg elemental iron) oral tablet  -- 1 tab(s) by mouth once a day  -- Indication: For Anemia due to other cause    magnesium oxide 400 mg (241.3 mg elemental magnesium) oral tablet  -- 1 tab(s) by mouth 3 times a day (with meals)  -- Indication: For Stomach upset     pantoprazole 40 mg oral delayed release tablet  -- 1 tab(s) by mouth 2 times a day (before meals)  -- Indication: For Duodenal adenocarcinoma I will START or STAY ON the medications listed below when I get home from the hospital:    traMADol 50 mg oral tablet  -- 1 tab(s) by mouth 4 times a day, As Needed  -- Indication: For Pain    Percocet 5/325 oral tablet  -- 1 tab(s) by mouth every 6 hours, As Needed  -- Indication: For Pain    Flomax 0.4 mg oral capsule  -- 1 cap(s) by mouth once a day (in the evening)  -- It is very important that you take or use this exactly as directed.  Do not skip doses or discontinue unless directed by your doctor.  May cause drowsiness.  Alcohol may intensify this effect.  Use care when operating dangerous machinery.  Some non-prescription drugs may aggravate your condition.  Read all labels carefully.  If a warning appears, check with your doctor before taking.  Swallow whole.  Do not crush.  Take with food or milk.    -- Indication: For uirne problem    Lantus 100 units/mL subcutaneous solution  -- 10 unit(s) subcutaneous once a day (at bedtime)  -- Indication: For DM    prochlorperazine 10 mg oral tablet  -- 1 tab(s) by mouth 4 times a day, As Needed  -- Indication: For Nausea     amlodipine 5 mg oral tablet  -- 1 tab(s) by mouth once a day in pm  -- Indication: For HTN    ferrous sulfate 325 mg (65 mg elemental iron) oral tablet  -- 1 tab(s) by mouth once a day  -- Indication: For Anemia due to other cause    magnesium oxide 400 mg (241.3 mg elemental magnesium) oral tablet  -- 1 tab(s) by mouth 3 times a day (with meals)  -- Indication: For Stomach upset     pantoprazole 40 mg oral delayed release tablet  -- 1 tab(s) by mouth 2 times a day (before meals)  -- Indication: For Duodenal adenocarcinoma    Vitamin B6 50 mg oral tablet  -- 1 tab(s) by mouth once a day  -- Indication: For supplement

## 2017-08-04 NOTE — DISCHARGE NOTE ADULT - PLAN OF CARE
Ask your PMD to check your CBC in 1 week Take iron supplement follow up with oncologist Follow up with oncologist Pancreatic drain to be taken care by [facility] take 10 units of insulin, check your surgar level, if high, please call your PCP and get your lantus dose adjusted. get your BMP checked in 1 week likely related to cancer, please call your PCP and get more workup done Ask your PMD to check your CBC in 1 week.
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
## 2017-08-04 NOTE — DISCHARGE NOTE ADULT - PATIENT PORTAL LINK FT
“You can access the [employer] Patient Portal, offered by [facility], by registering with the following website: [email or web address]”

## 2017-08-04 NOTE — PROGRESS NOTE ADULT - SUBJECTIVE AND OBJECTIVE BOX
[facility]    [id number]    57y      Male    Patient is a 57y old  Male who presents with a chief complaint of Abnormal labs. (03 Aug 2017 22:33)      INTERVAL HPI/OVERNIGHT EVENTS:    REVIEW OF SYSTEMS:    CONSTITUTIONAL: No fever, weight loss, or fatigue  RESPIRATORY: No cough, wheezing, hemoptysis; No shortness of breath  CARDIOVASCULAR: No chest pain, palpitations  GASTROINTESTINAL: No abdominal or epigastric pain. No nausea, vomiting  NEUROLOGICAL: No headaches,  loss of strength.  MISCELLANEOUS: No joint swelling or pain       Vital Signs Last 24 Hrs  T(C): 36.9 (04 Aug 2017 10:07), Max: 37.1 (04 Aug 2017 06:31)  T(F): 98.4 (04 Aug 2017 10:07), Max: 98.8 (04 Aug 2017 06:31)  HR: 84 (04 Aug 2017 10:07) (84 - 100)  BP: 126/70 (04 Aug 2017 10:07) (91/58 - 126/70)  BP(mean): --  RR: 14 (04 Aug 2017 10:07) (14 - 18)  SpO2: 98% (04 Aug 2017 10:07) (95% - 98%)    PHYSICAL EXAM:    GENERAL: Elderly male looking   HEENT: PERRL, +EOMI  NECK: soft, Supple, No JVD,   CHEST/LUNG: Clear to auscultate bilaterally; No wheezing  HEART: S1S2+, Regular rate and rhythm; No murmurs, rubs, or gallops  ABDOMEN: Soft, Nontender, Nondistended; Bowel sounds present  EXTREMITIES:  2+ Peripheral Pulses, No clubbing, cyanosis, or edema  SKIN: No rashes or lesions  NEURO: AAOX3, no focal deficits, no motor r sensory loss  PSYCH: normal mood      LABS:                        10.4   8.9   )-----------( 376      ( 04 Aug 2017 15:43 )             31.0     08-04    129<L>  |  97<L>  |  12.0  ----------------------------<  292<H>  4.6   |  20.0<L>  |  0.39<L>    Ca    8.4<L>      04 Aug 2017 15:43    TPro  6.9  /  Alb  2.6<L>  /  TBili  3.0<H>  /  DBili  x   /  AST  40<H>  /  ALT  29  /  AlkPhos  863<H>  08-03    PT/INR - ( 03 Aug 2017 16:52 )   PT: 15.7 sec;   INR: 1.42 ratio         PTT - ( 03 Aug 2017 16:52 )  PTT:27.9 sec        I&O's Summary    04 Aug 2017 07:01  -  04 Aug 2017 16:53  --------------------------------------------------------  IN: 570 mL / OUT: 2 mL / NET: 568 mL        MEDICATIONS  (STANDING):  famotidine    Tablet 20 milliGRAM(s) Oral daily  sodium chloride 0.9%. 1000 milliLiter(s) (100 mL/Hr) IV Continuous <Continuous>  pantoprazole    Tablet 40 milliGRAM(s) Oral before breakfast    MEDICATIONS  (PRN):  aluminum hydroxide/magnesium hydroxide/simethicone Suspension 30 milliLiter(s) Oral every 4 hours PRN Dyspepsia  ondansetron Injectable 4 milliGRAM(s) IV Push every 6 hours PRN Nausea and/or Vomiting [facility]    [id number]    57y      Male    Patient is a 57y old  Male who presents with a chief complaint of Abnormal labs. (03 Aug 2017 22:33)      INTERVAL HPI/OVERNIGHT EVENTS:  He is doing better, his symptoms has improved.     REVIEW OF SYSTEMS:    CONSTITUTIONAL: No fever, some fatigue  RESPIRATORY: No cough, No shortness of breath  CARDIOVASCULAR: No chest pain, palpitations  GASTROINTESTINAL: No abdominal or epigastric pain. No nausea, vomiting  NEUROLOGICAL: No headaches,  loss of strength.  MISCELLANEOUS: No joint swelling or pain       Vital Signs Last 24 Hrs  T(C): 36.9 (04 Aug 2017 10:07), Max: 37.1 (04 Aug 2017 06:31)  T(F): 98.4 (04 Aug 2017 10:07), Max: 98.8 (04 Aug 2017 06:31)  HR: 84 (04 Aug 2017 10:07) (84 - 100)  BP: 126/70 (04 Aug 2017 10:07) (91/58 - 126/70)  RR: 14 (04 Aug 2017 10:07) (14 - 18)  SpO2: 98% (04 Aug 2017 10:07) (95% - 98%)    PHYSICAL EXAM:    GENERAL:Middle age male looking comfortable  HEENT: PERRL, +EOMI  NECK: soft, Supple, No JVD,   CHEST/LUNG: decrease entry air bilaterally; No wheezing  HEART: S1S2+, Regular rate and rhythm; No murmurs  ABDOMEN: Soft, Nontender, Nondistended; Bowel sounds present  EXTREMITIES:  1+ Peripheral Pulses, Noedema  SKIN: No rashes or lesions  NEURO: AAOX3, no focal deficits, no motor r sensory loss  PSYCH: normal mood      LABS:                        10.4   8.9   )-----------( 376      ( 04 Aug 2017 15:43 )             31.0     08-04    129<L>  |  97<L>  |  12.0  ----------------------------<  292<H>  4.6   |  20.0<L>  |  0.39<L>    Ca    8.4<L>      04 Aug 2017 15:43    TPro  6.9  /  Alb  2.6<L>  /  TBili  3.0<H>  /  DBili  x   /  AST  40<H>  /  ALT  29  /  AlkPhos  863<H>  08-03    PT/INR - ( 03 Aug 2017 16:52 )   PT: 15.7 sec;   INR: 1.42 ratio         PTT - ( 03 Aug 2017 16:52 )  PTT:27.9 sec        I&O's Summary    04 Aug 2017 07:01  -  04 Aug 2017 16:53  --------------------------------------------------------  IN: 570 mL / OUT: 2 mL / NET: 568 mL        MEDICATIONS  (STANDING):  famotidine    Tablet 20 milliGRAM(s) Oral daily  sodium chloride 0.9%. 1000 milliLiter(s) (100 mL/Hr) IV Continuous <Continuous>  pantoprazole    Tablet 40 milliGRAM(s) Oral before breakfast    MEDICATIONS  (PRN):  aluminum hydroxide/magnesium hydroxide/simethicone Suspension 30 milliLiter(s) Oral every 4 hours PRN Dyspepsia  ondansetron Injectable 4 milliGRAM(s) IV Push every 6 hours PRN Nausea and/or Vomiting

## 2017-08-04 NOTE — DISCHARGE NOTE ADULT - PROVIDER TOKENS
FREE:[LAST:[tray Clarke],PHONE:[(   )    -],FAX:[(   )    -],ADDRESS:[1 week please call and get an appiontment.]],FREE:[LAST:[Oncologist as scheduled],PHONE:[(   )    -],FAX:[(   )    -]]

## 2017-08-04 NOTE — DISCHARGE NOTE ADULT - MEDICATION SUMMARY - MEDICATIONS TO CHANGE
I will SWITCH the dose or number of times a day I take the medications listed below when I get home from the hospital:    insulin glargine  -- 10 unit(s) subcutaneous once a day (at bedtime)

## 2017-08-04 NOTE — CONSULT NOTE ADULT - ASSESSMENT
Unresectable pancreatic ca with duodenal and gastric invasion with prior Gastric outlet obstruction.  Palliated with gastrojejjunostomy.  On chemo.  Debilitated.   No active GI bleeding.  Unclear why right chest tube is in place.    Suggest hydration.  Restart of Diet; Regular with CHO restrictions.  Mobilize.  No plans for GI intervention.  Outpatient Oncology follow up.
57 year old gentleman with advanced unresectable duodenal adenocarcinoma encasing the uncinate with retroperitoneal implant positive for adenocarcinoma, s/p PTC drain, on FOLFOX s/p 4 cycles last of which was on 7/6/17.  Cycle 5 was held due to neutropenia.  Currently admitted with symptomatic anemia.

## 2017-08-04 NOTE — DISCHARGE NOTE ADULT - CARE PROVIDER_API CALL
tray Clarke,   1 week please call and get an appiontment.  Phone: (   )    -  Fax: (   )    -    Oncologist as scheduled,   Phone: (   )    -  Fax: (   )    -

## 2017-08-04 NOTE — CONSULT NOTE ADULT - PROBLEM SELECTOR RECOMMENDATION 2
-advanced unresectable, on FOLFOX chemotherapy  -further treatment as an outpatient with Dr. Valderrama

## 2017-08-04 NOTE — DISCHARGE NOTE ADULT - HOSPITAL COURSE
HPI:     56 y/o male  s/p duodenal adenocarcinoma , s/p laparotomy, gastrojejunostomy and enterectomy by Dr. Sosa on  3/20/17 was seen by her oncologist Dr. Abreu today and was noted to have Hb 7.8 from his baseline of 9 was sent to ER as he has been very fatigued, tired and on and off feeling dizzy. no HA reported.  pt. reports no blood per rectum or hematemesis. sometimes gets nausea, + heart burn. no abd pain. no fever. ellie cough / phlegm. no cp. no sob. As per family his last chemo was about 2 weeks ago and today he did not receive any treatment at cancer center and was sent to ER. pt. has a drain over his rt. lower chest wall which was put in after his surgery back in march, 2017 as per pt. As per pt. he never had blood transfusion in the past.  pt's initial labs in ER showed glucose of 50 , he was given orange juice and zachery repeat accuchk is 201.      Hospital course   Patient was admitted for symptomatic anemiam got 2 units of prbc. iv fluids,  ct head to r/o mets to see if that is causing dizziness, CT head came with no acute findings, after   on and off dizziness is likely from symptmatic anemia , trop neg. ekg with no acute changes.    GI bleed, unspecified, will keep on protonix drip, has been c/o heart burn, [facility].    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    Hypoalbuminemia with sever malnutrition, GI consult on board.    Elevated alk. phos, likely related to his malignancy. HPI:   58 y/o male  s/p duodenal adenocarcinoma , s/p laparotomy, gastrojejunostomy and enterectomy by Dr. Sosa on  3/20/17 was seen by her oncologist Dr. Abreu today and was noted to have Hb 7.8 from his baseline of 9 was sent to ER as he has been very fatigued, tired and on and off feeling dizzy. no HA reported.  pt. reports no blood per rectum or hematemesis. sometimes gets nausea, + heart burn. no abd pain. no fever. ellie cough / phlegm. no cp. no sob. As per family his last chemo was about 2 weeks ago and today he did not receive any treatment at cancer center and was sent to ER. pt. has a drain over his rt. lower chest wall which was put in after his surgery back in march, 2017 as per pt. As per pt. he never had blood transfusion in the past.  pt's initial labs in ER showed glucose of 50 , he was given orange juice and zachery repeat accuchk is 201.      Hospital course   Patient was admitted for symptomatic anemiam got 2 units of prbc. iv fluids,  ct head to r/o mets to see if that is causing dizziness, CT head came with no acute findings, after   on and off dizziness is likely from symptmatic anemia , trop neg. ekg with no acute changes.    GI bleed, unspecified, will keep on protonix drip, has been c/o heart burn, [facility].    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    Hypoalbuminemia with sever malnutrition, GI consult on board.    Elevated alk. phos, likely related to his malignancy. HPI:   58 y/o male  s/p duodenal adenocarcinoma , s/p laparotomy, gastrojejunostomy and enterectomy by Dr. Sosa on  3/20/17 was seen by her oncologist Dr. Abreu today and was noted to have Hb 7.8 from his baseline of 9 was sent to ER as he has been very fatigued, tired and on and off feeling dizzy. no HA reported.  pt. reports no blood per rectum or hematemesis. sometimes gets nausea, + heart burn. no abd pain. no fever. ellie cough / phlegm. no cp. no sob. As per family his last chemo was about 2 weeks ago and today he did not receive any treatment at cancer center and was sent to ER. pt. has a drain over his rt. lower chest wall which was put in after his surgery back in march, 2017 as per pt. As per pt. he never had blood transfusion in the past.  pt's initial labs in ER showed glucose of 50 , he was given orange juice and zachery repeat accuchk is 201.      Hospital course:     Patient was admitted for symptomatic anemia  got 2 units of prbc, he was given iv fluids,  ct head to r/o mets to see if that is causing dizziness, CT head came with no acute findings, after transfusion his improved, dizziness was likely from symptomatic anemia , trop neg. ekg with no acute changes, patient was found to have positive occult blood but no, bloody or black stools, he has Hx of the duodinl cancer, it is going to be positive, he was seen GI, no intervention, he was given protonix IV then changed to PO, he was seen by oncology, he was also seen by oncology, they will follow him as out patient, his H&H was monitored, his Hb improved and went up to 10, he need to have CBC checked in 1 week.    patient was also found to have lows sugars his lantus dose is being reduced to 1o units, he was also found to have hyponatremia remained stable, he has previous Hx of hyponatremia, will have BMP checked in 1 week.   he was found to have elevated alk. phos, likely related to his malignancy, need to have follow up by PCP.   Patient was evaluated by PT and recommended home with home care and walker.      Patient is doing well, he has been feeling at his baseline, being discharged home with services in a stable condition.     Total time spent 40minutes.

## 2017-08-04 NOTE — CONSULT NOTE ADULT - PROBLEM SELECTOR RECOMMENDATION 9
-FOBT positive, loss likely from tumor  -s/p 2 units prbc, hgb improved to 8.9 g/dL  -on PPI infusion  -GI consult pending

## 2017-08-04 NOTE — PHYSICAL THERAPY INITIAL EVALUATION ADULT - PERTINENT HX OF CURRENT PROBLEM, REHAB EVAL
Pt is a 56 y/o male who was sent by oncologist 2/2 anemia on outpatient labs. Pt has hx/o CA s/p ex-lap 3/20/17.

## 2017-08-04 NOTE — DISCHARGE NOTE ADULT - CARE PLAN
Principal Discharge DX:	Anemia due to other cause  Goal:	Ask your PMD to check your CBC in 1 week  Instructions for follow-up, activity and diet:	Take iron supplement  Secondary Diagnosis:	Duodenal adenocarcinoma  Goal:	follow up with oncologist  Secondary Diagnosis:	Malignant neoplasm of pancreas, unspecified location of malignancy  Goal:	Follow up with oncologist  Instructions for follow-up, activity and diet:	Pancreatic drain to be taken care by [facility]  Secondary Diagnosis:	Type 2 diabetes mellitus with other neurologic complication  Goal:	take 10 units of insulin, check your surgar level, if high, please call your PCP and get your lantus dose adjusted.  Secondary Diagnosis:	Hyponatremia  Goal:	get your BMP checked in 1 week  Secondary Diagnosis:	Elevated alkaline phosphatase level  Goal:	likely related to cancer, please call your PCP and get more workup done Principal Discharge DX:	Anemia due to other cause  Goal:	Ask your PMD to check your CBC in 1 week.  Instructions for follow-up, activity and diet:	Take iron supplement  Secondary Diagnosis:	Duodenal adenocarcinoma  Goal:	follow up with oncologist  Secondary Diagnosis:	Malignant neoplasm of pancreas, unspecified location of malignancy  Goal:	Follow up with oncologist  Instructions for follow-up, activity and diet:	Pancreatic drain to be taken care by [facility]  Secondary Diagnosis:	Type 2 diabetes mellitus with other neurologic complication  Goal:	take 10 units of insulin, check your surgar level, if high, please call your PCP and get your lantus dose adjusted.  Secondary Diagnosis:	Hyponatremia  Goal:	get your BMP checked in 1 week  Secondary Diagnosis:	Elevated alkaline phosphatase level  Goal:	likely related to cancer, please call your PCP and get more workup done

## 2017-08-05 RX ORDER — PANTOPRAZOLE SODIUM 20 MG/1
1 TABLET, DELAYED RELEASE ORAL
Qty: 60 | Refills: 0 | OUTPATIENT
Start: 2017-08-05 | End: 2017-09-04

## 2017-08-05 RX ORDER — TAMSULOSIN HYDROCHLORIDE 0.4 MG/1
1 CAPSULE ORAL
Qty: 30 | Refills: 0 | OUTPATIENT
Start: 2017-08-05 | End: 2017-09-04

## 2017-08-10 ENCOUNTER — RESULT REVIEW (OUTPATIENT)
Age: 57
End: 2017-08-10

## 2017-08-10 ENCOUNTER — APPOINTMENT (OUTPATIENT)
Dept: HEMATOLOGY ONCOLOGY | Facility: CLINIC | Age: 57
End: 2017-08-10
Payer: MEDICAID

## 2017-08-10 VITALS
HEART RATE: 95 BPM | RESPIRATION RATE: 14 BRPM | WEIGHT: 118.5 LBS | TEMPERATURE: 97.6 F | SYSTOLIC BLOOD PRESSURE: 98 MMHG | OXYGEN SATURATION: 100 % | DIASTOLIC BLOOD PRESSURE: 66 MMHG | BODY MASS INDEX: 19.72 KG/M2

## 2017-08-10 VITALS — SYSTOLIC BLOOD PRESSURE: 93 MMHG | DIASTOLIC BLOOD PRESSURE: 64 MMHG

## 2017-08-10 DIAGNOSIS — C17.0 MALIGNANT NEOPLASM OF DUODENUM: ICD-10-CM

## 2017-08-10 DIAGNOSIS — R17 UNSPECIFIED JAUNDICE: ICD-10-CM

## 2017-08-10 DIAGNOSIS — D64.9 ANEMIA, UNSPECIFIED: ICD-10-CM

## 2017-08-10 DIAGNOSIS — D70.9 NEUTROPENIA, UNSPECIFIED: ICD-10-CM

## 2017-08-10 DIAGNOSIS — G89.18 OTHER ACUTE POSTPROCEDURAL PAIN: ICD-10-CM

## 2017-08-10 LAB
BASOPHILS # BLD AUTO: 0.1 K/UL — SIGNIFICANT CHANGE UP (ref 0–0.2)
BASOPHILS NFR BLD AUTO: 0.7 % — SIGNIFICANT CHANGE UP (ref 0–2)
EOSINOPHIL # BLD AUTO: 0 K/UL — SIGNIFICANT CHANGE UP (ref 0–0.5)
EOSINOPHIL NFR BLD AUTO: 0.3 % — SIGNIFICANT CHANGE UP (ref 0–6)
HCT VFR BLD CALC: 32.7 % — LOW (ref 39–50)
HGB BLD-MCNC: 11.2 G/DL — LOW (ref 13–17)
LYMPHOCYTES # BLD AUTO: 1.8 K/UL — SIGNIFICANT CHANGE UP (ref 1–3.3)
LYMPHOCYTES # BLD AUTO: 24.3 % — SIGNIFICANT CHANGE UP (ref 13–44)
MCHC RBC-ENTMCNC: 30.6 PG — SIGNIFICANT CHANGE UP (ref 27–34)
MCHC RBC-ENTMCNC: 34.2 GM/DL — SIGNIFICANT CHANGE UP (ref 32–36)
MCV RBC AUTO: 89.7 FL — SIGNIFICANT CHANGE UP (ref 80–100)
MONOCYTES # BLD AUTO: 0.4 K/UL — SIGNIFICANT CHANGE UP (ref 0–0.9)
MONOCYTES NFR BLD AUTO: 4.8 % — SIGNIFICANT CHANGE UP (ref 2–14)
NEUTROPHILS # BLD AUTO: 5.3 K/UL — SIGNIFICANT CHANGE UP (ref 1.8–7.4)
NEUTROPHILS NFR BLD AUTO: 70 % — SIGNIFICANT CHANGE UP (ref 43–77)
PLATELET # BLD AUTO: 337 K/UL — SIGNIFICANT CHANGE UP (ref 150–400)
RBC # BLD: 3.64 M/UL — LOW (ref 4.2–5.8)
RBC # FLD: 15.7 % — HIGH (ref 10.3–14.5)
WBC # BLD: 7.6 K/UL — SIGNIFICANT CHANGE UP (ref 3.8–10.5)
WBC # FLD AUTO: 7.6 K/UL — SIGNIFICANT CHANGE UP (ref 3.8–10.5)

## 2017-08-10 PROCEDURE — 99215 OFFICE O/P EST HI 40 MIN: CPT

## 2017-08-11 ENCOUNTER — APPOINTMENT (OUTPATIENT)
Dept: INFUSION THERAPY | Facility: CLINIC | Age: 57
End: 2017-08-11

## 2017-08-11 LAB
ALBUMIN SERPL ELPH-MCNC: 2.4 G/DL
ALP BLD-CCNC: 874 U/L
ALT SERPL-CCNC: 60 U/L
ANION GAP SERPL CALC-SCNC: 14 MMOL/L
AST SERPL-CCNC: 64 U/L
BILIRUB SERPL-MCNC: 10.3 MG/DL
BUN SERPL-MCNC: 11 MG/DL
CALCIUM SERPL-MCNC: 8.6 MG/DL
CHLORIDE SERPL-SCNC: 99 MMOL/L
CO2 SERPL-SCNC: 21 MMOL/L
CREAT SERPL-MCNC: 0.66 MG/DL
GLUCOSE SERPL-MCNC: 100 MG/DL
MAGNESIUM SERPL-MCNC: 1.8 MG/DL
POTASSIUM SERPL-SCNC: 4.2 MMOL/L
PROT SERPL-MCNC: 6.2 G/DL
SODIUM SERPL-SCNC: 134 MMOL/L

## 2017-08-12 PROBLEM — C17.0 DUODENAL ADENOCARCINOMA: Status: ACTIVE | Noted: 2017-02-22

## 2017-08-12 PROBLEM — R17 JAUNDICE: Status: ACTIVE | Noted: 2017-08-10

## 2017-08-22 ENCOUNTER — OUTPATIENT (OUTPATIENT)
Dept: OUTPATIENT SERVICES | Facility: HOSPITAL | Age: 57
LOS: 1 days | Discharge: HOME | End: 2017-08-22

## 2017-08-22 DIAGNOSIS — C17.0 MALIGNANT NEOPLASM OF DUODENUM: ICD-10-CM

## 2017-08-22 DIAGNOSIS — Z96.89 PRESENCE OF OTHER SPECIFIED FUNCTIONAL IMPLANTS: Chronic | ICD-10-CM

## 2017-08-22 DIAGNOSIS — C25.9 MALIGNANT NEOPLASM OF PANCREAS, UNSPECIFIED: Chronic | ICD-10-CM

## 2017-08-22 DIAGNOSIS — Z98.890 OTHER SPECIFIED POSTPROCEDURAL STATES: Chronic | ICD-10-CM

## 2017-08-28 ENCOUNTER — APPOINTMENT (OUTPATIENT)
Dept: HEMATOLOGY ONCOLOGY | Facility: CLINIC | Age: 57
End: 2017-08-28

## 2017-12-18 PROCEDURE — 80053 COMPREHEN METABOLIC PANEL: CPT

## 2017-12-18 PROCEDURE — 71045 X-RAY EXAM CHEST 1 VIEW: CPT

## 2017-12-18 PROCEDURE — 86900 BLOOD TYPING SEROLOGIC ABO: CPT

## 2017-12-18 PROCEDURE — 36415 COLL VENOUS BLD VENIPUNCTURE: CPT

## 2017-12-18 PROCEDURE — C1887: CPT

## 2017-12-18 PROCEDURE — 76000 FLUOROSCOPY <1 HR PHYS/QHP: CPT

## 2017-12-18 PROCEDURE — 87186 SC STD MICRODIL/AGAR DIL: CPT

## 2017-12-18 PROCEDURE — 87205 SMEAR GRAM STAIN: CPT

## 2017-12-18 PROCEDURE — 80076 HEPATIC FUNCTION PANEL: CPT

## 2017-12-18 PROCEDURE — 86850 RBC ANTIBODY SCREEN: CPT

## 2017-12-18 PROCEDURE — 83690 ASSAY OF LIPASE: CPT

## 2017-12-18 PROCEDURE — 93005 ELECTROCARDIOGRAM TRACING: CPT

## 2017-12-18 PROCEDURE — 85610 PROTHROMBIN TIME: CPT

## 2017-12-18 PROCEDURE — 87070 CULTURE OTHR SPECIMN AEROBIC: CPT

## 2017-12-18 PROCEDURE — 86901 BLOOD TYPING SEROLOGIC RH(D): CPT

## 2017-12-18 PROCEDURE — 85027 COMPLETE CBC AUTOMATED: CPT

## 2017-12-18 PROCEDURE — C1729: CPT

## 2017-12-18 PROCEDURE — 80048 BASIC METABOLIC PNL TOTAL CA: CPT

## 2017-12-18 PROCEDURE — 87075 CULTR BACTERIA EXCEPT BLOOD: CPT

## 2017-12-18 PROCEDURE — 85730 THROMBOPLASTIN TIME PARTIAL: CPT

## 2017-12-18 PROCEDURE — T1013: CPT

## 2017-12-18 PROCEDURE — 83735 ASSAY OF MAGNESIUM: CPT

## 2017-12-18 PROCEDURE — 87040 BLOOD CULTURE FOR BACTERIA: CPT

## 2017-12-18 PROCEDURE — 84100 ASSAY OF PHOSPHORUS: CPT

## 2017-12-18 PROCEDURE — 83605 ASSAY OF LACTIC ACID: CPT

## 2017-12-18 PROCEDURE — 82248 BILIRUBIN DIRECT: CPT

## 2017-12-18 PROCEDURE — 76705 ECHO EXAM OF ABDOMEN: CPT

## 2017-12-18 PROCEDURE — 82962 GLUCOSE BLOOD TEST: CPT

## 2017-12-18 PROCEDURE — 99285 EMERGENCY DEPT VISIT HI MDM: CPT | Mod: 25

## 2018-01-22 NOTE — ED ADULT NURSE NOTE - PAIN RATING/NUMBER SCALE (0-10): ACTIVITY
----- Message from Ari Medrano RN sent at 7/14/2017  2:43 PM CDT -----  Regarding: FW: colon      ----- Message -----  From: Ari Medrano RN  Sent: 7/1/2017  To: Aimee Amaro Staff Nurse Msg Pool /Cc/Wfb/  Subject: colon                                            5 year f/u colon due      Ceci Garcia MD, MD Physician  7/9/2012  2:25 PM  Signed  l tics  V tort sig  7y with ped scope and mac
Orders placed for colonoscopy   Orders for admissions and Episodes of care placed    Reminder letter # 2 printed and sent to pt.
0

## 2018-07-16 PROBLEM — K75.9 INFLAMMATORY LIVER DISEASE, UNSPECIFIED: Chronic | Status: ACTIVE | Noted: 2017-03-17

## 2018-07-23 PROBLEM — Z78.9 ALCOHOL USE: Status: ACTIVE | Noted: 2017-02-22

## 2018-08-23 NOTE — H&P ADULT - NSHPRISKHEPCSCREEN_GEN_A_CORE
1.BLOOD TEST  2.SEE   3.FOLLOW UP 3 MONTHS-RECHECK GLYCOHB AT THAT TIME  4.START REQUIP  
Not applicable (known HCV negative status in last year)

## 2019-06-21 NOTE — ED ADULT NURSE NOTE - NS ED NOTE ABUSE RESPONSE YN
June 5 patient woke up with swelling of right knee it has been tight and swollen but not significantly painful.  This has improved but patient still feels tight band across the front of his knee and outside lateral portion of his knee feels unstable he has no recent falls patient recalls before the swelling happened he had been [place] the [place] and at the gym.  He does not recall any trauma that incited this injury/swelling.  Patient has been avoiding excessive walking or hiking.  He did have a walk with his wife last night for a mile and a half his right knee just did not feel right.  Advised him to use ibuprofen 600 mg 3 times a day around-the-clock for 3 to 5 days.  Along with ice pack 20 minutes on 20 minutes off and an Ace wrap.  X-ray ordered today.  If no improvement will need an MRI.  Referral done to orthopedics clinic patient also advised on orthopedic urgent care services.   no

## 2020-05-06 NOTE — ED ADULT NURSE NOTE - PAIN RATING/NUMBER SCALE (0-10): REST
You have been diagnosed with seizures. Do not drive, climb objects, operate heavy machinery, climb on roofs, take baths alone, or ride horses until cleared by neurology or primary care physician. Take medications as (if) prescribed.     3

## 2020-05-14 NOTE — ED ADULT NURSE NOTE - CHPI ED SYMPTOMS POS
HPI:   Patient is a 69y male with a past history of a TBI, s/p VPS in 2017, treated for COVID 3 weeks ago-? supportive care, who was admitted to [facility] on 5/7 with fever, a leukocytosis, and hypoxia.  He was treated for pneumonia with vanco and cefepime, the former stopped 5/12 with an elevated vanco level.His PC has decreased from 1.2 to .39.He is not verbal and can not give a history.He is also restless.    REVIEW OF SYSTEMS:  All other review of systems negative (Comprehensive ROS): limited, resltless, asking for water    PAST MEDICAL & SURGICAL HISTORY:  Bipolar disorder  UTI (urinary tract infection)  Dysphonia  Metabolic encephalopathy  COVID-19  Altered mental status  Traumatic brain injury, without loss of consciousness, initial encounter  Hypertension  Personal history of spine surgery      Allergies    No Known Allergies    Intolerances        Antimicrobials Day #  :day 7  cefepime   IVPB 2000 milliGRAM(s) IV Intermittent <User Schedule>    Other Medications:  acetaminophen   Tablet .. 975 milliGRAM(s) Oral every 6 hours PRN  acetaminophen  Suppository .. 650 milliGRAM(s) Rectal every 6 hours PRN  ALPRAZolam 0.125 milliGRAM(s) Oral three times a day  apixaban 5 milliGRAM(s) Oral every 12 hours  chlorhexidine 4% Liquid 1 Application(s) Topical <User Schedule>  dextrose 40% Gel 15 Gram(s) Oral once PRN  dextrose 5%. 1000 milliLiter(s) IV Continuous <Continuous>  dextrose 50% Injectable 12.5 Gram(s) IV Push once  dextrose 50% Injectable 25 Gram(s) IV Push once  dextrose 50% Injectable 25 Gram(s) IV Push once  diVALproex Sprinkle 750 milliGRAM(s) Oral every 12 hours  famotidine    Tablet 20 milliGRAM(s) Oral daily  insulin lispro (HumaLOG) corrective regimen sliding scale   SubCutaneous every 6 hours  lamoTRIgine 100 milliGRAM(s) Oral two times a day  latanoprost 0.005% Ophthalmic Solution 1 Drop(s) Both EYES at bedtime      FAMILY HISTORY:  FH: alcoholism      SOCIAL HISTORY:  Smoking: x    ETOH: x    Drug Use: x      T(F): 99.7 (05-14-20 @ 05:19), Max: 99.7 (05-14-20 @ 05:19)  HR: 93 (05-14-20 @ 05:19)  BP: 111/67 (05-14-20 @ 05:19)  RR: 16 (05-14-20 @ 05:19)  SpO2: 100% (05-14-20 @ 11:00)  Wt(kg): --    PHYSICAL EXAM:  General: alert, no acute distress, restless  Eyes:  anicteric, no conjunctival injection, no discharge  Oropharynx: no lesions or injection 	  Neck: supple, without adenopathy  Lungs: distant BS  Heart: regular rate and rhythm; no murmur, rubs or gallops  Abdomen: soft, nondistended, nontender, without mass or organomegaly  Skin: no lesions  Extremities: no clubbing, cyanosis, or edema  Neurologic: alert,restless, hypophonic speech    LAB RESULTS:                        11.7   14.23 )-----------( 278      ( 14 May 2020 06:56 )             35.9     05-14    143  |  108  |  32<H>  ----------------------------<  132<H>  3.5   |  29  |  0.76    Ca    8.0<L>      14 May 2020 11:14  Phos  2.9     05-14  Mg     2.2     05-14    TPro  5.0<L>  /  Alb  1.3<L>  /  TBili  0.2  /  DBili  x   /  AST  42<H>  /  ALT  38  /  AlkPhos  144<H>  05-14    LIVER FUNCTIONS - ( 14 May 2020 11:14 )  Alb: 1.3 g/dL / Pro: 5.0 g/dL / ALK PHOS: 144 U/L / ALT: 38 U/L / AST: 42 U/L / GGT: x               MICROBIOLOGY:  RECENT CULTURES:    5/7 blood and urine cultures negative  Sputum MURF  COVID 19 PCR positive on 5/13, negative on 5/7 and 5/9    RADIOLOGY REVIEWED:  < from: Xray Chest 1 View-PORTABLE IMMEDIATE (05.13.20 @ 08:48) >  INTERPRETATION:    Examination: XR CHEST IMMEDIATE    History: ADMDIAG1: J96.01 ACUTE RESPIRATORY FAILURE WITH HYPOXIA/, Abnormal Chest Sounds    Portable chest x-ray is compared to a previous examination dated 5/12/2020.    Impression: Stable NG tube. Stable right  shunt. Stable orthopedic hardware at the upper thoracic spine.    No evidence for pleural effusion or pneumothorax.    Grossly stable airspace opacifications in both lungs.    Stable cardiac silhouette.     Stable gaseous dilatation of the splenic flexure.    < end of copied text >  < from: CT Head No Cont (05.07.20 @ 15:38) >    IMPRESSION:  Limited exam due to motion    No acute intracranial hemorrhage or acute territorial infarct.  If symptoms persist, follow-up MRI exam recommended.    < end of copied text >  < from: US Duplex Venous Lower Ext Complete, Bilateral (05.08.20 @ 18:56) >  IMPRESSION:    Small right calf DVT. Normal from the knee the groin on either side.    < end of copied text > WEAKNESS/low hemoglobin

## 2020-08-18 NOTE — BRIEF OPERATIVE NOTE - SURGICAL END DATE/TIME
COVID-19 ED/Flu Clinic Initial Outreach Note    This Aliyahwil Elizondo made an attempt to  contact the patient by telephone to perform post discharge call.  Left message with reason for call     Patient was seen for shortness of breath
03-May-2017 15:08

## 2021-05-18 NOTE — ED ADULT NURSE NOTE - EXTERNAL GENITALIA
56 yo M with hx of diverticulitis, is POD1 s/p hand-assisted sigmoidectomy.    Plan:  -monitor vital signs  -pain and nausea control prn  -c/w FLD, will advance to [facility] in the am  -encourage IS use  -encourage OOB/ambulation  -monitor bowel function  -serial abdominal exams  -GI/DVT ppx    Plan discussed with Dr. Jordan Penial implant

## 2022-05-08 NOTE — PROGRESS NOTE ADULT - PROBLEM/PLAN-5
Major Shift Events: Neuro intact and able to make needs known. Complained of pain a few times with some relief with Fentanyl boluses. Vitals stable. Vent settings unchanged. L chest tube with serous fluid out and R with none. Around chest tube dressing on right side large amounts of serous output. Voiding spontaneously. Tube feeding at goal with 60 FWF. Neck abd (actual dressing changed by ENT during day) changed a couple times due to moderate amount of tan, thin to thick secretions.  K replaced. Next recheck at 1030 today. Family updated via phone.    Plan: Mom would like to be updated via phone call from MD. Will let oncoming RN know about request.    For vital signs and complete assessments, please see documentation flowsheets.      DISPLAY PLAN FREE TEXT

## 2022-07-11 NOTE — H&P PST ADULT - TOBACCO USE
What Is The Reason For Today's Visit?: Full Body Skin Examination with No Concerns What Is The Reason For Today's Visit? (Being Monitored For X): surveillance against the recurrence of atypical nevi Never smoker

## 2022-07-14 NOTE — H&P PST ADULT - PATIENT/CAREGIVER OFFERED  INTERPRETER SERVICES
[FreeTextEntry1] : reviewed lab work . Low sodium, mild chronic, declined endocrine/nephrology referral at this time. repeat BMP next visit. He has added some salt in his diet. \par headache, dizziness resolved.Change position slowly, continue hydration, sun protection. \par Cardiology, neurology notes reviewed.\par  pt. will see vascular. \par  pt. will consider Gabapentin alternative if he feels need to change. yes no

## 2022-10-04 NOTE — H&P PST ADULT - NEGATIVE GASTROINTESTINAL SYMPTOMS
Patient/Caregiver provided printed discharge information.
no nausea/no constipation/no diarrhea/no change in bowel habits

## 2023-07-13 NOTE — ASU PREOP CHECKLIST - COMMENTS
Pt took famotidine today at 0430 with sip water <-- Click to add NO significant Past Surgical History
